# Patient Record
Sex: MALE | Race: WHITE | Employment: OTHER | URBAN - METROPOLITAN AREA
[De-identification: names, ages, dates, MRNs, and addresses within clinical notes are randomized per-mention and may not be internally consistent; named-entity substitution may affect disease eponyms.]

---

## 2017-02-16 ENCOUNTER — HOSPITAL ENCOUNTER (EMERGENCY)
Age: 77
Discharge: HOME | End: 2017-02-16
Attending: EMERGENCY MEDICINE
Payer: MEDICARE

## 2017-02-16 ENCOUNTER — APPOINTMENT (OUTPATIENT)
Dept: GENERAL RADIOLOGY | Age: 77
End: 2017-02-16
Attending: PHYSICIAN ASSISTANT
Payer: MEDICARE

## 2017-02-16 ENCOUNTER — APPOINTMENT (OUTPATIENT)
Dept: CT IMAGING | Age: 77
End: 2017-02-16
Attending: PHYSICIAN ASSISTANT
Payer: MEDICARE

## 2017-02-16 VITALS
OXYGEN SATURATION: 100 % | TEMPERATURE: 97.9 F | WEIGHT: 201.94 LBS | BODY MASS INDEX: 30.61 KG/M2 | DIASTOLIC BLOOD PRESSURE: 77 MMHG | RESPIRATION RATE: 24 BRPM | HEART RATE: 73 BPM | HEIGHT: 68 IN | SYSTOLIC BLOOD PRESSURE: 170 MMHG

## 2017-02-16 DIAGNOSIS — I16.0 HYPERTENSIVE URGENCY: Primary | ICD-10-CM

## 2017-02-16 LAB
ALBUMIN SERPL BCP-MCNC: 3.7 G/DL (ref 3.5–5)
ALBUMIN/GLOB SERPL: 1 {RATIO} (ref 1.1–2.2)
ALP SERPL-CCNC: 58 U/L (ref 45–117)
ALT SERPL-CCNC: 21 U/L (ref 12–78)
ANION GAP BLD CALC-SCNC: 10 MMOL/L (ref 5–15)
APPEARANCE UR: CLEAR
AST SERPL W P-5'-P-CCNC: 15 U/L (ref 15–37)
ATRIAL RATE: 84 BPM
BACTERIA URNS QL MICRO: NEGATIVE /HPF
BASOPHILS # BLD AUTO: 0 K/UL (ref 0–0.1)
BASOPHILS # BLD: 1 % (ref 0–1)
BILIRUB SERPL-MCNC: 1.1 MG/DL (ref 0.2–1)
BILIRUB UR QL: NEGATIVE
BUN SERPL-MCNC: 15 MG/DL (ref 6–20)
BUN/CREAT SERPL: 12 (ref 12–20)
CALCIUM SERPL-MCNC: 9.1 MG/DL (ref 8.5–10.1)
CALCULATED P AXIS, ECG09: 49 DEGREES
CALCULATED R AXIS, ECG10: -42 DEGREES
CALCULATED T AXIS, ECG11: 43 DEGREES
CHLORIDE SERPL-SCNC: 104 MMOL/L (ref 97–108)
CK MB CFR SERPL CALC: 2.3 % (ref 0–2.5)
CK MB SERPL-MCNC: 2.2 NG/ML (ref 5–25)
CK SERPL-CCNC: 94 U/L (ref 39–308)
CO2 SERPL-SCNC: 24 MMOL/L (ref 21–32)
COLOR UR: ABNORMAL
CREAT SERPL-MCNC: 1.28 MG/DL (ref 0.7–1.3)
DIAGNOSIS, 93000: NORMAL
EOSINOPHIL # BLD: 0.2 K/UL (ref 0–0.4)
EOSINOPHIL NFR BLD: 3 % (ref 0–7)
EPITH CASTS URNS QL MICRO: ABNORMAL /LPF
ERYTHROCYTE [DISTWIDTH] IN BLOOD BY AUTOMATED COUNT: 12.7 % (ref 11.5–14.5)
GLOBULIN SER CALC-MCNC: 3.7 G/DL (ref 2–4)
GLUCOSE SERPL-MCNC: 225 MG/DL (ref 65–100)
GLUCOSE UR STRIP.AUTO-MCNC: 100 MG/DL
HCT VFR BLD AUTO: 37.6 % (ref 36.6–50.3)
HGB BLD-MCNC: 13.6 G/DL (ref 12.1–17)
HGB UR QL STRIP: ABNORMAL
HYALINE CASTS URNS QL MICRO: ABNORMAL /LPF (ref 0–5)
KETONES UR QL STRIP.AUTO: NEGATIVE MG/DL
LEUKOCYTE ESTERASE UR QL STRIP.AUTO: ABNORMAL
LYMPHOCYTES # BLD AUTO: 24 % (ref 12–49)
LYMPHOCYTES # BLD: 1.5 K/UL (ref 0.8–3.5)
MCH RBC QN AUTO: 31.6 PG (ref 26–34)
MCHC RBC AUTO-ENTMCNC: 36.2 G/DL (ref 30–36.5)
MCV RBC AUTO: 87.4 FL (ref 80–99)
MONOCYTES # BLD: 0.4 K/UL (ref 0–1)
MONOCYTES NFR BLD AUTO: 7 % (ref 5–13)
NEUTS SEG # BLD: 4 K/UL (ref 1.8–8)
NEUTS SEG NFR BLD AUTO: 65 % (ref 32–75)
NITRITE UR QL STRIP.AUTO: NEGATIVE
P-R INTERVAL, ECG05: 146 MS
PH UR STRIP: 5.5 [PH] (ref 5–8)
PLATELET # BLD AUTO: 127 K/UL (ref 150–400)
POTASSIUM SERPL-SCNC: 4.1 MMOL/L (ref 3.5–5.1)
PROT SERPL-MCNC: 7.4 G/DL (ref 6.4–8.2)
PROT UR STRIP-MCNC: 100 MG/DL
Q-T INTERVAL, ECG07: 354 MS
QRS DURATION, ECG06: 108 MS
QTC CALCULATION (BEZET), ECG08: 418 MS
RBC # BLD AUTO: 4.3 M/UL (ref 4.1–5.7)
RBC #/AREA URNS HPF: ABNORMAL /HPF (ref 0–5)
SODIUM SERPL-SCNC: 138 MMOL/L (ref 136–145)
SP GR UR REFRACTOMETRY: 1.01 (ref 1–1.03)
TROPONIN I SERPL-MCNC: <0.04 NG/ML
UA: UC IF INDICATED,UAUC: ABNORMAL
UROBILINOGEN UR QL STRIP.AUTO: 0.2 EU/DL (ref 0.2–1)
VENTRICULAR RATE, ECG03: 84 BPM
WBC # BLD AUTO: 6.2 K/UL (ref 4.1–11.1)
WBC URNS QL MICRO: ABNORMAL /HPF (ref 0–4)

## 2017-02-16 PROCEDURE — 99284 EMERGENCY DEPT VISIT MOD MDM: CPT

## 2017-02-16 PROCEDURE — 84484 ASSAY OF TROPONIN QUANT: CPT | Performed by: PHYSICIAN ASSISTANT

## 2017-02-16 PROCEDURE — 85025 COMPLETE CBC W/AUTO DIFF WBC: CPT | Performed by: PHYSICIAN ASSISTANT

## 2017-02-16 PROCEDURE — 93005 ELECTROCARDIOGRAM TRACING: CPT

## 2017-02-16 PROCEDURE — 36415 COLL VENOUS BLD VENIPUNCTURE: CPT | Performed by: PHYSICIAN ASSISTANT

## 2017-02-16 PROCEDURE — 81001 URINALYSIS AUTO W/SCOPE: CPT | Performed by: PHYSICIAN ASSISTANT

## 2017-02-16 PROCEDURE — 71020 XR CHEST PA LAT: CPT

## 2017-02-16 PROCEDURE — 80053 COMPREHEN METABOLIC PANEL: CPT | Performed by: PHYSICIAN ASSISTANT

## 2017-02-16 PROCEDURE — 82550 ASSAY OF CK (CPK): CPT | Performed by: PHYSICIAN ASSISTANT

## 2017-02-16 PROCEDURE — 70450 CT HEAD/BRAIN W/O DYE: CPT

## 2017-02-16 RX ORDER — SODIUM CHLORIDE 0.9 % (FLUSH) 0.9 %
5-10 SYRINGE (ML) INJECTION EVERY 8 HOURS
Status: DISCONTINUED | OUTPATIENT
Start: 2017-02-16 | End: 2017-02-16 | Stop reason: HOSPADM

## 2017-02-16 RX ORDER — SODIUM CHLORIDE 0.9 % (FLUSH) 0.9 %
5-10 SYRINGE (ML) INJECTION AS NEEDED
Status: DISCONTINUED | OUTPATIENT
Start: 2017-02-16 | End: 2017-02-16 | Stop reason: HOSPADM

## 2017-02-16 NOTE — ED PROVIDER NOTES
HPI Comments: Fadumo Gunderson is a 68 y.o. male with pertinent PMHx of HTN, DM, CAD (1 stent) and liver disease presenting ambulatory to the ED c/o high blood pressure, from 591W-854L systolic, x this morning. Pt states that he \"felt flushed\" and experienced general malaise while at work today, which caused him concern. Pt states that he applied a wet wash cloth to his head and checked his blood pressure, which was 303 systolic and became progressively higher through multiple re-checks. Pt notes associated symptoms of lightheadedness, SOB, and slight headache. Pt states that these symptoms have decreased since onset. Pt states that his blood pressure was 826 systolic before deciding to come to the ED. Pt states that he is compliant with both of his blood pressure medications (Atenolol and Lisinopril), which he took this morning as usual. Pt states that his last cardiology appointment was over 1 year ago. Pt specifically denies any chest pain, vision changes, weakness, numbness, difficulty swallowing, recent cough/cold symptoms/congestion, fevers/chills, N/V/D (notes baseline diarrhea from Metformin use) or abdominal pain. PCP: Valarie Pink MD  Cardiology: Dr. Radha Schafer Hx: - tobacco use, - alcohol use, - illicit drug use    There are no other complaints, changes, or physical findings at this time. The history is provided by the patient. No  was used. Past Medical History:   Diagnosis Date    Arthritis     CAD (coronary artery disease)      coronary stent    Diabetes ([facility] [address]      iddm newly on insulin 3/2012    Hypertension     Liver disease        Past Surgical History:   Procedure Laterality Date    Pr cardiac surg procedure unlist  1997     stents    Hx cholecystectomy      Hx appendectomy      Hx tonsillectomy      Hx orthopaedic       Rt.  Knee cartlidge removal    Hx urological       Rt Orchiectomy    Vascular surgery procedure unlist       carotid artery endarterectomy    Pr colon ca scrn not hi rsk ind  10/29/2013               History reviewed. No pertinent family history. Social History     Social History    Marital status:      Spouse name: N/A    Number of children: N/A    Years of education: N/A     Occupational History    Not on file. Social History Main Topics    Smoking status: Never Smoker    Smokeless tobacco: Never Used    Alcohol use No    Drug use: No    Sexual activity: Not on file     Other Topics Concern    Not on file     Social History Narrative         ALLERGIES: Penicillins    Review of Systems   Constitutional: Negative. Negative for chills and fever. HENT: Negative for congestion, rhinorrhea and sore throat. Eyes: Negative. Negative for visual disturbance. Respiratory: Positive for shortness of breath. Negative for cough, chest tightness and wheezing. Cardiovascular: Negative. Negative for chest pain and palpitations. Gastrointestinal: Negative. Negative for abdominal pain, constipation, diarrhea, nausea and vomiting. Genitourinary: Negative. Negative for dysuria and hematuria. Musculoskeletal: Negative. Negative for arthralgias and myalgias. Skin: Negative. Negative for rash. Allergic/Immunologic: Negative. Negative for environmental allergies and food allergies. Neurological: Positive for light-headedness and headaches. Negative for weakness and numbness. Psychiatric/Behavioral: Negative. Negative for suicidal ideas. Vitals:    02/16/17 1145 02/16/17 1215 02/16/17 1230 02/16/17 1232   BP: 174/76   170/77   Pulse: 77 71 77 73   Resp: 23 24 23 24   Temp:       SpO2: 98% 98% 99% 100%   Weight:       Height:                Physical Exam   Constitutional: He is oriented to person, place, and time. He appears well-developed. No distress. Pt appears well, awake and alert in NAD. Elevated BMI   HENT:   Head: Normocephalic and atraumatic.    Right Ear: Tympanic membrane, external ear and ear canal normal.   Left Ear: Tympanic membrane, external ear and ear canal normal.   Nose: Nose normal.   Mouth/Throat: Uvula is midline and mucous membranes are normal.   Dry mucous membranes   Eyes: Conjunctivae and EOM are normal. Pupils are equal, round, and reactive to light. Right eye exhibits no discharge. Left eye exhibits no discharge. Neck: Normal range of motion. Cardiovascular: Normal rate, normal heart sounds and intact distal pulses. Pulmonary/Chest: Effort normal and breath sounds normal. No respiratory distress. He has no wheezes. He has no rales. He exhibits no tenderness. Abdominal: Soft. Bowel sounds are normal. There is no tenderness. There is no guarding. No CVA tenderness b/l. Protuberant abdomen   Musculoskeletal: Normal range of motion. He exhibits no edema or tenderness. Lymphadenopathy:     He has no cervical adenopathy. Neurological: He is alert and oriented to person, place, and time. No cranial nerve deficit. He displays a negative Romberg sign. Coordination normal. GCS eye subscore is 4. GCS verbal subscore is 5. GCS motor subscore is 6. No focal neuro deficits. Neuro intact of UE and LE B/L, Sensation intact of UE and LE B/L. Strength 5/5 of UE B/L, Strength 5/5 of LE B/L. Pt ambulatory with steady gait, without difficulty or assistance. Skin: Skin is warm and dry. No rash noted. He is not diaphoretic. No erythema. No pallor. Psychiatric: He has a normal mood and affect. His behavior is normal.   Nursing note and vitals reviewed.        MDM  Number of Diagnoses or Management Options  Hypertensive urgency:   Diagnosis management comments: DDx: hypertensive urgency vs hypertensive emergency,     acute ischemic heart disease       Amount and/or Complexity of Data Reviewed  Clinical lab tests: ordered and reviewed  Tests in the radiology section of CPT®: ordered and reviewed  Tests in the medicine section of CPT®: ordered and reviewed  Discussion of test results with the performing providers: yes (Attending)  Review and summarize past medical records: yes  Discuss the patient with other providers: yes (Attending)  Independent visualization of images, tracings, or specimens: yes    Patient Progress  Patient progress: improved    ED Course       Procedures    Progress Note:  10:52 AM  Per nursing staff, pt states that he also experienced palpitations/\"chest fluttering\" with his episode of feeling flushed this morning. Pt denies any history of arrhythmia. Written by JOSE JUAN Vinson, as dictated by Latricia Delgado PA-C. Progress Note:  11:03 AM  Discussed pt's hx and available diagnostic and imaging results with Daniel Bond MD, the pt's attending. Reviewed care plans and Daniel Bond MD is in agreement with the plan of care. Daniel Bond MD advises to monitor the pt's blood pressure at this time. Written by JOSE JUAN Vinson, as dictated by Latricia Delgado PA-C. Progress Note:  12:31 PM  Pt re-evaluated. The pt and spouse has asked if pt may be dc at this time, as he has an appointment at 1300 that he does not want to miss. Written by JOSE JUAN Vinson, as dictated by Latricia Delgado PA-C. Progress Note:  12:43 PM  Discussed pt's hx and available diagnostic and imaging results with Daniel Bond MD, the pt's attending. Reviewed care plans and Daniel Bond MD is in agreement with discharge at this time, as long as the pt has ambulated without difficulty or dizziness and will follow up with his PCP in the next 24-48 hours. Pt re-evaluated. Pt's blood pressure is 502L systolic at this time. Pt states that he has a PCP a week from now, but states that he will call to make a sooner appointment. Written by JOSE JUAN Vinson, as dictated by Latricia Delgado PA-C. Progress Note:  12:50 PM  Per nursing staff, pt has ambulated without difficulty. Written by JOSE JUAN Vinson, as dictated by Marquise Tran PA-C.     73:97SN  Provider re-evaluated pt. Per patient, symptoms have resolved. Provider discussed all available diagnostics, diagnosis, and treatment plan. Thoroughly discussed worrisome signs/symptoms in which pt should immediately return to ED, otherwise follow up with PCP. Patient conveys understanding and agreement to all of the above. All patient's questions were answered by provider. JADE Sapp     LABORATORY TESTS:  Recent Results (from the past 12 hour(s))   EKG, 12 LEAD, INITIAL    Collection Time: 02/16/17 10:33 AM   Result Value Ref Range    Ventricular Rate 84 BPM    Atrial Rate 84 BPM    P-R Interval 146 ms    QRS Duration 108 ms    Q-T Interval 354 ms    QTC Calculation (Bezet) 418 ms    Calculated P Axis 49 degrees    Calculated R Axis -42 degrees    Calculated T Axis 43 degrees    Diagnosis       Normal sinus rhythm  Left axis deviation  When compared with ECG of 19-MAR-2012 08:04,  No significant change was found     CBC WITH AUTOMATED DIFF    Collection Time: 02/16/17 11:05 AM   Result Value Ref Range    WBC 6.2 4.1 - 11.1 K/uL    RBC 4.30 4. 10 - 5.70 M/uL    HGB 13.6 12.1 - 17.0 g/dL    HCT 37.6 36.6 - 50.3 %    MCV 87.4 80.0 - 99.0 FL    MCH 31.6 26.0 - 34.0 PG    MCHC 36.2 30.0 - 36.5 g/dL    RDW 12.7 11.5 - 14.5 %    PLATELET 403 (L) 351 - 400 K/uL    NEUTROPHILS 65 32 - 75 %    LYMPHOCYTES 24 12 - 49 %    MONOCYTES 7 5 - 13 %    EOSINOPHILS 3 0 - 7 %    BASOPHILS 1 0 - 1 %    ABS. NEUTROPHILS 4.0 1.8 - 8.0 K/UL    ABS. LYMPHOCYTES 1.5 0.8 - 3.5 K/UL    ABS. MONOCYTES 0.4 0.0 - 1.0 K/UL    ABS. EOSINOPHILS 0.2 0.0 - 0.4 K/UL    ABS.  BASOPHILS 0.0 0.0 - 0.1 K/UL   METABOLIC PANEL, COMPREHENSIVE    Collection Time: 02/16/17 11:05 AM   Result Value Ref Range    Sodium 138 136 - 145 mmol/L    Potassium 4.1 3.5 - 5.1 mmol/L    Chloride 104 97 - 108 mmol/L    CO2 24 21 - 32 mmol/L    Anion gap 10 5 - 15 mmol/L    Glucose 225 (H) 65 - 100 mg/dL    BUN 15 6 - 20 MG/DL    Creatinine 1.28 0.70 - 1.30 MG/DL    BUN/Creatinine ratio 12 12 - 20      GFR est AA >60 >60 ml/min/1.73m2    GFR est non-AA 55 (L) >60 ml/min/1.73m2    Calcium 9.1 8.5 - 10.1 MG/DL    Bilirubin, total 1.1 (H) 0.2 - 1.0 MG/DL    ALT (SGPT) 21 12 - 78 U/L    AST (SGOT) 15 15 - 37 U/L    Alk. phosphatase 58 45 - 117 U/L    Protein, total 7.4 6.4 - 8.2 g/dL    Albumin 3.7 3.5 - 5.0 g/dL    Globulin 3.7 2.0 - 4.0 g/dL    A-G Ratio 1.0 (L) 1.1 - 2.2     TROPONIN I    Collection Time: 02/16/17 11:05 AM   Result Value Ref Range    Troponin-I, Qt. <0.04 <0.05 ng/mL   CK W/ CKMB & INDEX    Collection Time: 02/16/17 11:05 AM   Result Value Ref Range    CK 94 39 - 308 U/L    CK - MB 2.2 <3.6 NG/ML    CK-MB Index 2.3 0 - 2.5     URINALYSIS W/ REFLEX CULTURE    Collection Time: 02/16/17 11:05 AM   Result Value Ref Range    Color YELLOW/STRAW      Appearance CLEAR CLEAR      Specific gravity 1.009 1.003 - 1.030      pH (UA) 5.5 5.0 - 8.0      Protein 100 (A) NEG mg/dL    Glucose 100 (A) NEG mg/dL    Ketone NEGATIVE  NEG mg/dL    Bilirubin NEGATIVE  NEG      Blood SMALL (A) NEG      Urobilinogen 0.2 0.2 - 1.0 EU/dL    Nitrites NEGATIVE  NEG      Leukocyte Esterase TRACE (A) NEG      WBC 0-4 0 - 4 /hpf    RBC 5-10 0 - 5 /hpf    Epithelial cells FEW FEW /lpf    Bacteria NEGATIVE  NEG /hpf    UA:UC IF INDICATED CULTURE NOT INDICATED BY UA RESULT CNI      Hyaline cast 0-2 0 - 5 /lpf       IMAGING RESULTS:  CXR Results  (Last 48 hours)               02/16/17 1154  XR CHEST PA LAT Final result    Impression:  Impression: Left basilar atelectasis otherwise no acute abnormality. Narrative:  Exam:  2 view chest       Indication: Hypertension with lightheadedness today       Comparison to 5/1/2012. PA and lateral views demonstrate normal heart size. Left basilar atelectasis is   noted. The lungs are otherwise clear. Degenerative changes are seen in the   thoracic spine.                CT Results  (Last 48 hours) 02/16/17 1115  CT HEAD WO CONT Final result    Impression:  IMPRESSION: No acute abnormality               Narrative:  EXAM:  CT HEAD WO CONT       INDICATION:   lightheadedness and headache with elevated blood pressure today       COMPARISON: None. TECHNIQUE: Unenhanced CT of the head was performed using 5 mm images. Brain and   bone windows were generated. CT dose reduction was achieved through use of a   standardized protocol tailored for this examination and automatic exposure   control for dose modulation. FINDINGS:   Generalized atrophy is noted. There is no significant white matter disease. There is no intracranial hemorrhage, extra-axial collection, mass, mass effect   or midline shift. The basilar cisterns are open. No acute infarct is   identified. The bone windows demonstrate no abnormalities. The visualized   portions of the paranasal sinuses and mastoid air cells are clear. Vascular   calcification is noted. MEDICATIONS GIVEN:  Medications   sodium chloride (NS) flush 5-10 mL (not administered)   sodium chloride (NS) flush 5-10 mL (not administered)       IMPRESSION:  1. Hypertensive urgency        PLAN:  1. Current Discharge Medication List      CONTINUE these medications which have NOT CHANGED    Details   aspirin delayed-release 81 mg tablet Take 81 mg by mouth daily. insulin glargine (LANTUS SOLOSTAR) 100 unit/mL (3 mL) pen 15 Units by SubCUTAneous route daily. multivitamin (ONE A DAY) tablet Take 1 Tab by mouth daily. atenolol (TENORMIN) 50 mg tablet Take  by mouth daily. lisinopril (PRINIVIL, ZESTRIL) 40 mg tablet Take 40 mg by mouth daily. lovastatin (MEVACOR) 10 mg tablet Take 40 mg by mouth nightly. metformin (GLUCOPHAGE) 1,000 mg tablet Take 1,000 mg by mouth two (2) times daily (with meals). glipiZIDE (GLUCOTROL) 10 mg tablet Take 10 mg by mouth two (2) times a day.       traMADol (ULTRAM) 50 mg tablet Take 1 Tab by mouth every six (6) hours as needed for Pain. Max Daily Amount: 200 mg. Qty: 20 Tab, Refills: 0           2. Follow-up Information     Follow up With Details Comments Oswald Becerril MD Schedule an appointment as soon as possible for a visit in 1 day  [address]  [address] [address] [phone]      \[facility]\"" EMERGENCY DEPT  As needed or, If symptoms worsen [address] [facility] [address]   [phone]        Return to ED if worse   DISCHARGE NOTE:  12:54 PM  The patient is ready for discharge. The patient's signs, symptoms, diagnosis, and discharge instructions have been discussed and the patient and/or family has conveyed their understanding. The patient and/or family is to follow up as recommended or return to the ER should their symptoms worsen. Plan has been discussed and the patient and/or family is in agreement. Written by Kailee Alvarez, ED Scribe, as dictated by Rosendo Mcneal PA-C. Attestation: This note is prepared by Ralph Pineda. Saray Alvarez, acting as Scribe for [employer]p Kinney. Rosendo Mcneal PA-C: The scribe's documentation has been prepared under my direction and personally reviewed by me in its entirety. I confirm that the note above accurately reflects all work, treatment, procedures, and medical decision making performed by me. This note will not be viewable in [address]

## 2017-02-16 NOTE — DISCHARGE INSTRUCTIONS
Acute High Blood Pressure: Care Instructions  Your Care Instructions  Acute high blood pressure is very high blood pressure. It's a serious problem. Very high blood pressure can damage your brain, heart, eyes, and kidneys. You may have been given medicines to lower your blood pressure. You may have gotten them as pills or through a needle in one of your veins. This is called an IV. And maybe you were given other medicines too. These can be needed when high blood pressure causes other problems. To keep your blood pressure at a lower level, you may need to make healthy lifestyle changes. And you will probably need to take medicines. Be sure to follow up with your doctor about your blood pressure and what you can do about it. Follow-up care is a key part of your treatment and safety. Be sure to make and go to all appointments, and call your doctor if you are having problems. It's also a good idea to know your test results and keep a list of the medicines you take. How can you care for yourself at home? · See your doctor as often as he or she recommends. This is to make sure your blood pressure is under control. You will probably go at least 2 times a year. But it may be more often at first.  · Take your blood pressure medicine exactly as prescribed. You may take one or more types. They include diuretics, beta-blockers, ACE inhibitors, calcium channel blockers, and angiotensin II receptor blockers. Call your doctor if you think you are having a problem with your medicine. · If you take blood pressure medicine, talk to your doctor before you take decongestants or anti-inflammatory medicine, such as ibuprofen. These can raise blood pressure. · Learn how to check your blood pressure at home. Check it often. · Ask your doctor if it's okay to drink alcohol. · Talk to your doctor about lifestyle changes that can help blood pressure. These include being active and not smoking.   When should you call for help?  Call 911 anytime you think you may need emergency care. This may mean having symptoms that suggest that your blood pressure is causing a serious heart or blood vessel problem. Your blood pressure may be over 180/110. For example, call 911 if:  · You have symptoms of a heart attack. These may include:  ¨ Chest pain or pressure, or a strange feeling in the chest.  ¨ Sweating. ¨ Shortness of breath. ¨ Nausea or vomiting. ¨ Pain, pressure, or a strange feeling in the back, neck, jaw, or upper belly or in one or both shoulders or arms. ¨ Lightheadedness or sudden weakness. ¨ A fast or irregular heartbeat. · You have symptoms of a stroke. These may include:  ¨ Sudden numbness, tingling, weakness, or loss of movement in your face, arm, or leg, especially on only one side of your body. ¨ Sudden vision changes. ¨ Sudden trouble speaking. ¨ Sudden confusion or trouble understanding simple statements. ¨ Sudden problems with walking or balance. ¨ A sudden, severe headache that is different from past headaches. · You have severe back or belly pain. Do not wait until your blood pressure comes down on its own. Get help right away. Call your doctor now or seek immediate care if:  · Your blood pressure is much higher than normal (such as 180/110 or higher), but you don't have symptoms. · You think high blood pressure is causing symptoms, such as:  ¨ Severe headache. ¨ Blurry vision. Watch closely for changes in your health, and be sure to contact your doctor if:  · Your blood pressure measures 140/90 or higher at least 2 times. That means the top number is 140 or higher or the bottom number is 90 or higher, or both. · You think you may be having side effects from your blood pressure medicine. · Your blood pressure is usually normal, but it goes above normal at least 2 times. Where can you learn more? Go to http://wilman-aliza.info/.   Enter W692 in the search box to learn more about \"Acute High Blood Pressure: Care Instructions. \"  Current as of: August 8, 2016  Content Version: 11.1  © 5097-0058 [employer]. Care instructions adapted under license by [employer] (which disclaims liability or warranty for this information). If you have questions about a medical condition or this instruction, always ask your healthcare professional. [address] any warranty or liability for your use of this information.

## 2019-08-31 ENCOUNTER — APPOINTMENT (OUTPATIENT)
Dept: GENERAL RADIOLOGY | Age: 79
End: 2019-08-31
Attending: EMERGENCY MEDICINE
Payer: MEDICARE

## 2019-08-31 ENCOUNTER — HOSPITAL ENCOUNTER (EMERGENCY)
Age: 79
Discharge: HOME | End: 2019-08-31
Attending: EMERGENCY MEDICINE
Payer: MEDICARE

## 2019-08-31 ENCOUNTER — APPOINTMENT (OUTPATIENT)
Dept: ULTRASOUND IMAGING | Age: 79
End: 2019-08-31
Attending: EMERGENCY MEDICINE
Payer: MEDICARE

## 2019-08-31 VITALS
DIASTOLIC BLOOD PRESSURE: 88 MMHG | WEIGHT: 187.83 LBS | RESPIRATION RATE: 16 BRPM | SYSTOLIC BLOOD PRESSURE: 182 MMHG | OXYGEN SATURATION: 100 % | HEIGHT: 67 IN | HEART RATE: 62 BPM | BODY MASS INDEX: 29.48 KG/M2 | TEMPERATURE: 97.4 F

## 2019-08-31 DIAGNOSIS — M79.604 RIGHT LEG PAIN: Primary | ICD-10-CM

## 2019-08-31 PROCEDURE — 73562 X-RAY EXAM OF KNEE 3: CPT

## 2019-08-31 PROCEDURE — 93971 EXTREMITY STUDY: CPT

## 2019-08-31 PROCEDURE — 99283 EMERGENCY DEPT VISIT LOW MDM: CPT

## 2019-08-31 NOTE — ED NOTES
Bedside and Verbal shift change report given to [facility], RN (oncoming nurse) by Tylor Sung RN (offgoing nurse). Report included the following information SBAR, ED Summary, MAR and Recent Results.

## 2019-08-31 NOTE — ED NOTES
Report received from [facility]. They advised of the patient's chief complaint, current status, orders completed (to include IV access/medications/radiology testing), outstanding orders that still need to be completed, and the treatment plan. Questions asked and answered prior to assumption of care.

## 2019-09-01 NOTE — ED PROVIDER NOTES
EMERGENCY DEPARTMENT HISTORY AND PHYSICAL EXAM      Date: 8/31/2019  Patient Name: Dario Lepe Sr.  Patient Age and Sex: 78 y.o. male    History of Presenting Illness     Chief Complaint   Patient presents with    Leg Pain     sent by pt [facility] to r/o dvt. rt posterior lower leg pain with foot swelling since thursday. no recent travel       History Provided By: Patient    Ability to gather history was limited by: None    HPI: Dolores Johnson., 78 y.o. male sent from local urgent care center for rule out DVT. Almost a week of persistent pain in the right lower leg, mostly in the calf and behind the right knee. Says that he had a minor stumble on uneven pavement last week, may have \"tweaked\" his knee. No shortness of breath. No new numbness or weakness. Location: Pain and pressure behind the right knee and in the right calf  Quality:    Pressure  Severity: Mild  Duration: 1 week  Timing:    Constant  Context: Spontaneous, may have been triggered by minor fall  Modifying factors: None  Associated symptoms: No weakness or numbness    Pt denies any other alleviating or exacerbating factors. There are no other complaints, changes or physical findings at this time. Past Medical History:   Diagnosis Date    Arthritis     CAD (coronary artery disease)     coronary stent    Diabetes ([facility] [address]     iddm newly on insulin 3/2012    Hypertension     Liver disease      Past Surgical History:   Procedure Laterality Date    CARDIAC SURG PROCEDURE UNLIST  1997    stents    HX APPENDECTOMY      HX CHOLECYSTECTOMY      HX ORTHOPAEDIC      Rt.  Knee cartlidge removal    HX TONSILLECTOMY      HX UROLOGICAL      Rt Orchiectomy    [place] COLON CA SCRN NOT HI RSK IND  10/29/2013         VASCULAR SURGERY PROCEDURE UNLIST      carotid artery endarterectomy       PCP: Trista James MD    Past History   Past Medical History:  Past Medical History:   Diagnosis Date    Arthritis     CAD (coronary artery disease) coronary stent    Diabetes ([facility] [address]     iddm newly on insulin 3/2012    Hypertension     Liver disease        Past Surgical History:  Past Surgical History:   Procedure Laterality Date    CARDIAC SURG PROCEDURE UNLIST  1997    stents    HX APPENDECTOMY      HX CHOLECYSTECTOMY      HX ORTHOPAEDIC      Rt. Knee cartlidge removal    HX TONSILLECTOMY      HX UROLOGICAL      Rt Orchiectomy    CT COLON CA SCRN NOT HI RSK IND  10/29/2013         VASCULAR SURGERY PROCEDURE UNLIST      carotid artery endarterectomy       Family History:  History reviewed. No pertinent family history. Social History:  Social History     Tobacco Use    Smoking status: Never Smoker    Smokeless tobacco: Never Used   Substance Use Topics    Alcohol use: No    Drug use: No       Allergies: Allergies   Allergen Reactions    Penicillins Rash       Current Medications:  No current facility-administered medications on file prior to encounter. Current Outpatient Medications on File Prior to Encounter   Medication Sig Dispense Refill    traMADol (ULTRAM) 50 mg tablet Take 1 Tab by mouth every six (6) hours as needed for Pain. Max Daily Amount: 200 mg. 20 Tab 0    aspirin delayed-release 81 mg tablet Take 81 mg by mouth daily.  insulin glargine (LANTUS SOLOSTAR) 100 unit/mL (3 mL) pen 15 Units by SubCUTAneous route daily.  multivitamin (ONE A DAY) tablet Take 1 Tab by mouth daily.  atenolol (TENORMIN) 50 mg tablet Take  by mouth daily.  lisinopril (PRINIVIL, ZESTRIL) 40 mg tablet Take 40 mg by mouth daily.  lovastatin (MEVACOR) 10 mg tablet Take 40 mg by mouth nightly.  metformin (GLUCOPHAGE) 1,000 mg tablet Take 1,000 mg by mouth two (2) times daily (with meals).  glipiZIDE (GLUCOTROL) 10 mg tablet Take 10 mg by mouth two (2) times a day. Review of Systems   Review of Systems   Cardiovascular: Positive for leg swelling. All other systems reviewed and are negative.       Physical Exam Vital Signs  Patient Vitals for the past 24 hrs:   Temp Pulse Resp BP SpO2   08/31/19 1741 97.4 °F (36.3 °C) 62 16 182/88 100 %       Physical Exam   Constitutional: He is oriented to person, place, and time. He appears well-developed and well-nourished. No distress. Musculoskeletal: Normal range of motion. Right lower leg: He exhibits no tenderness and no swelling. No significant concerning swelling or tenderness by exam.  No erythema. Palpable DP pulse on left. No palpable DP pulse on right but no unusual pallor or coolness. DP signal was readily identified by Doppler. Neurological: He is alert and oriented to person, place, and time. He has normal strength. No sensory deficit. Skin: Skin is warm and dry. No erythema. Psychiatric: He has a normal mood and affect. His behavior is normal. Thought content normal.   Nursing note and vitals reviewed. Diagnostic Study Results   Labs  No results found for this or any previous visit (from the past 24 hour(s)). Radiologic Studies  XR KNEE RT 3 V   Final Result   IMPRESSION: No acute abnormality. CT Results  (Last 48 hours)    None        CXR Results  (Last 48 hours)    None          Procedures     Procedures    Medical Decision Making     Provider Notes (Medical Decision Making):   70-year-old male with left leg pressure and swelling. By exam there is no significant swelling or tenderness. No erythema. No palpable DP pulse, but DP signal was readily identified by Doppler. He has a history of bilateral carotid endarterectomy, and likely is having some claudication and arterial compromise, but no evidence of acute limb ischemia at this time. No indication for emergent vascular consult or angiography. X-rays of the knee were negative. DVT study was negative. No evidence of Baker's cyst.  He will continue taking aspirin and follow-up with his vascular surgeon. Michelle Ocampo MD  11:20 PM        Consult required? No      Medications Administered During ED Course:  Medications - No data to display       Diagnosis     Disposition:  Discharged    Clinical Impression:   1. Right leg pain        Attestation:  I personally performed the services described in this documentation on this date 8/31/2019 for patient Amber Cantu Sr. Allan Miller MD        I was the first provider for this patient on this visit. To the best of my ability I reviewed relevant prior medical records, electrocardiograms, laboratories, and radiologic studies. The patient's presenting problems were discussed, and the patient was in agreement with the care plan formulated and outlined with them. Baron Brown MD    Please note that this dictation was completed with Dragon voice recognition software. Quite often unanticipated grammatical, syntax, homophones, and other interpretive errors are inadvertently transcribed by the computer software. Please disregard these errors and excuse any errors that have escaped final proofreading.

## 2021-04-10 ENCOUNTER — APPOINTMENT (OUTPATIENT)
Dept: ULTRASOUND IMAGING | Age: 81
End: 2021-04-10
Attending: EMERGENCY MEDICINE
Payer: MEDICARE

## 2021-04-10 ENCOUNTER — HOSPITAL ENCOUNTER (EMERGENCY)
Age: 81
Discharge: HOME | End: 2021-04-10
Attending: EMERGENCY MEDICINE | Admitting: EMERGENCY MEDICINE
Payer: MEDICARE

## 2021-04-10 ENCOUNTER — APPOINTMENT (OUTPATIENT)
Dept: GENERAL RADIOLOGY | Age: 81
End: 2021-04-10
Attending: EMERGENCY MEDICINE
Payer: MEDICARE

## 2021-04-10 VITALS
OXYGEN SATURATION: 98 % | SYSTOLIC BLOOD PRESSURE: 117 MMHG | BODY MASS INDEX: 29.4 KG/M2 | DIASTOLIC BLOOD PRESSURE: 59 MMHG | HEIGHT: 68 IN | HEART RATE: 69 BPM | TEMPERATURE: 97.4 F | RESPIRATION RATE: 16 BRPM | WEIGHT: 194 LBS

## 2021-04-10 DIAGNOSIS — L03.116 CELLULITIS OF LEFT LOWER EXTREMITY: Primary | ICD-10-CM

## 2021-04-10 DIAGNOSIS — R73.9 HYPERGLYCEMIA: ICD-10-CM

## 2021-04-10 DIAGNOSIS — M72.2 PLANTAR FASCIITIS: ICD-10-CM

## 2021-04-10 DIAGNOSIS — M79.604 RIGHT LEG PAIN: ICD-10-CM

## 2021-04-10 LAB
ALBUMIN SERPL-MCNC: 3.7 G/DL (ref 3.5–5)
ALBUMIN/GLOB SERPL: 0.9 {RATIO} (ref 1.1–2.2)
ALP SERPL-CCNC: 76 U/L (ref 45–117)
ALT SERPL-CCNC: 19 U/L (ref 12–78)
ANION GAP SERPL CALC-SCNC: 5 MMOL/L (ref 5–15)
AST SERPL-CCNC: 14 U/L (ref 15–37)
BASOPHILS # BLD: 0 K/UL (ref 0–0.1)
BASOPHILS NFR BLD: 1 % (ref 0–1)
BILIRUB SERPL-MCNC: 1.1 MG/DL (ref 0.2–1)
BUN SERPL-MCNC: 35 MG/DL (ref 6–20)
BUN/CREAT SERPL: 19 (ref 12–20)
CALCIUM SERPL-MCNC: 9.6 MG/DL (ref 8.5–10.1)
CHLORIDE SERPL-SCNC: 104 MMOL/L (ref 97–108)
CO2 SERPL-SCNC: 27 MMOL/L (ref 21–32)
CREAT SERPL-MCNC: 1.83 MG/DL (ref 0.7–1.3)
DIFFERENTIAL METHOD BLD: ABNORMAL
EOSINOPHIL # BLD: 0.2 K/UL (ref 0–0.4)
EOSINOPHIL NFR BLD: 3 % (ref 0–7)
ERYTHROCYTE [DISTWIDTH] IN BLOOD BY AUTOMATED COUNT: 12.1 % (ref 11.5–14.5)
GLOBULIN SER CALC-MCNC: 4.3 G/DL (ref 2–4)
GLUCOSE SERPL-MCNC: 414 MG/DL (ref 65–100)
HCT VFR BLD AUTO: 34.9 % (ref 36.6–50.3)
HGB BLD-MCNC: 12.3 G/DL (ref 12.1–17)
IMM GRANULOCYTES # BLD AUTO: 0 K/UL (ref 0–0.04)
IMM GRANULOCYTES NFR BLD AUTO: 0 % (ref 0–0.5)
LACTATE BLD-SCNC: 1.82 MMOL/L (ref 0.4–2)
LYMPHOCYTES # BLD: 1.6 K/UL (ref 0.8–3.5)
LYMPHOCYTES NFR BLD: 26 % (ref 12–49)
MCH RBC QN AUTO: 31.6 PG (ref 26–34)
MCHC RBC AUTO-ENTMCNC: 35.2 G/DL (ref 30–36.5)
MCV RBC AUTO: 89.7 FL (ref 80–99)
MONOCYTES # BLD: 0.4 K/UL (ref 0–1)
MONOCYTES NFR BLD: 7 % (ref 5–13)
NEUTS SEG # BLD: 4 K/UL (ref 1.8–8)
NEUTS SEG NFR BLD: 63 % (ref 32–75)
NRBC # BLD: 0 K/UL (ref 0–0.01)
NRBC BLD-RTO: 0 PER 100 WBC
PLATELET # BLD AUTO: 140 K/UL (ref 150–400)
PMV BLD AUTO: 10.2 FL (ref 8.9–12.9)
POTASSIUM SERPL-SCNC: 4.6 MMOL/L (ref 3.5–5.1)
PROT SERPL-MCNC: 8 G/DL (ref 6.4–8.2)
RBC # BLD AUTO: 3.89 M/UL (ref 4.1–5.7)
SODIUM SERPL-SCNC: 136 MMOL/L (ref 136–145)
WBC # BLD AUTO: 6.3 K/UL (ref 4.1–11.1)

## 2021-04-10 PROCEDURE — 96365 THER/PROPH/DIAG IV INF INIT: CPT

## 2021-04-10 PROCEDURE — 83605 ASSAY OF LACTIC ACID: CPT

## 2021-04-10 PROCEDURE — 85025 COMPLETE CBC W/AUTO DIFF WBC: CPT

## 2021-04-10 PROCEDURE — 74011250636 HC RX REV CODE- 250/636: Performed by: EMERGENCY MEDICINE

## 2021-04-10 PROCEDURE — 99284 EMERGENCY DEPT VISIT MOD MDM: CPT

## 2021-04-10 PROCEDURE — 80053 COMPREHEN METABOLIC PANEL: CPT

## 2021-04-10 PROCEDURE — 73620 X-RAY EXAM OF FOOT: CPT

## 2021-04-10 PROCEDURE — 96374 THER/PROPH/DIAG INJ IV PUSH: CPT

## 2021-04-10 PROCEDURE — 93971 EXTREMITY STUDY: CPT

## 2021-04-10 PROCEDURE — 74011636637 HC RX REV CODE- 636/637: Performed by: EMERGENCY MEDICINE

## 2021-04-10 PROCEDURE — 36415 COLL VENOUS BLD VENIPUNCTURE: CPT

## 2021-04-10 PROCEDURE — 96375 TX/PRO/DX INJ NEW DRUG ADDON: CPT

## 2021-04-10 RX ORDER — HYDROCHLOROTHIAZIDE 25 MG/1
25 TABLET ORAL
COMMUNITY
End: 2022-01-04

## 2021-04-10 RX ORDER — CLINDAMYCIN HYDROCHLORIDE 300 MG/1
300 CAPSULE ORAL 4 TIMES DAILY
Qty: 40 CAP | Refills: 0 | Status: SHIPPED
Start: 2021-04-10 | End: 2022-01-04

## 2021-04-10 RX ORDER — GABAPENTIN 600 MG/1
600 TABLET ORAL 2 TIMES DAILY
COMMUNITY

## 2021-04-10 RX ORDER — GLUCOSAMINE SULFATE 1500 MG
1000 POWDER IN PACKET (EA) ORAL DAILY
COMMUNITY

## 2021-04-10 RX ORDER — INSULIN GLARGINE 100 [IU]/ML
15 INJECTION, SOLUTION SUBCUTANEOUS DAILY
COMMUNITY
End: 2022-05-06 | Stop reason: ALTCHOICE

## 2021-04-10 RX ORDER — METHOCARBAMOL 750 MG/1
750 TABLET, FILM COATED ORAL
Qty: 20 TAB | Refills: 0 | Status: SHIPPED | OUTPATIENT
Start: 2021-04-10 | End: 2022-05-06 | Stop reason: ALTCHOICE

## 2021-04-10 RX ORDER — CLINDAMYCIN PHOSPHATE 600 MG/50ML
600 INJECTION INTRAVENOUS
Status: COMPLETED | OUTPATIENT
Start: 2021-04-10 | End: 2021-04-10

## 2021-04-10 RX ORDER — AMLODIPINE BESYLATE 5 MG/1
5 TABLET ORAL DAILY
COMMUNITY
End: 2022-01-04

## 2021-04-10 RX ORDER — LEVOFLOXACIN 750 MG/1
750 TABLET ORAL EVERY OTHER DAY
COMMUNITY
Start: 2021-03-29 | End: 2022-01-04

## 2021-04-10 RX ORDER — FINASTERIDE 5 MG/1
5 TABLET, FILM COATED ORAL DAILY
COMMUNITY

## 2021-04-10 RX ORDER — TAMSULOSIN HYDROCHLORIDE 0.4 MG/1
0.4 CAPSULE ORAL
COMMUNITY

## 2021-04-10 RX ADMIN — HUMAN INSULIN 2 UNITS: 100 INJECTION, SOLUTION SUBCUTANEOUS at 15:27

## 2021-04-10 RX ADMIN — CLINDAMYCIN IN 5 PERCENT DEXTROSE 600 MG: 12 INJECTION, SOLUTION INTRAVENOUS at 14:16

## 2021-04-10 NOTE — ED PROVIDER NOTES
EMERGENCY DEPARTMENT HISTORY AND PHYSICAL EXAM      Date: 4/10/2021  Patient Name: Preston Holstein. History of Presenting Illness     Chief Complaint   Patient presents with    Skin Problem     pt has an infection in his left foot for which he is on antibx. Family is concerned because it is not getting better.  Knee Pain     swelling area behind rt knee since yesterday. History Provided By: Patient    HPI: Jhonathan Bernal Sr., 80 y.o. male presents to the ED with cc of left foot infection and right leg pain. Patient states that he has had an infection in his left fifth toe for least 10 days. He was prescribed Levaquin and has finished his course. His ulcer to the left fifth toe was reevaluated yesterday and cleaned thoroughly. He has not seen much improvement in the area and has some erythema involving the distal left foot. His pain is moderate severity basically when he touches the area. Patient denies fever, chills, cough, chest pain or shortness of breath. He also has pain behind his right knee. He states that started yesterday. He thought he had a charley horse, but the pain is still present when he walks. At that time, is a 4 out of 10 in severity. It is from the posterior right knee down to the calf. He also states that when he walks after being seated for a long time, he feels intense pain in his left plantar area which resolves with ambulation. There are no other complaints, changes, or physical findings at this time. PCP: Eugenia Valiente MD    No current facility-administered medications on file prior to encounter. Current Outpatient Medications on File Prior to Encounter   Medication Sig Dispense Refill    gabapentin (NEURONTIN) 600 mg tablet Take 600 mg by mouth two (2) times a day.  hydroCHLOROthiazide (HYDRODIURIL) 25 mg tablet Take 25 mg by mouth nightly.  finasteride (PROSCAR) 5 mg tablet Take 5 mg by mouth daily.       amLODIPine (NORVASC) 5 mg tablet Take 5 mg by mouth daily.  tamsulosin (FLOMAX) 0.4 mg capsule Take 0.4 mg by mouth nightly.  cholecalciferol (Vitamin D3) 25 mcg (1,000 unit) cap Take 1,000 Units by mouth daily.  insulin glargine (Lantus Solostar U-100 Insulin) 100 unit/mL (3 mL) inpn 15 Units by SubCUTAneous route daily.  levoFLOXacin (LEVAQUIN) 750 mg tablet Take 750 mg by mouth every other day.  aspirin delayed-release 81 mg tablet Take 81 mg by mouth daily.  multivitamin (ONE A DAY) tablet Take 1 Tab by mouth daily.  atenolol (TENORMIN) 50 mg tablet Take 100 mg by mouth daily.  lisinopril (PRINIVIL, ZESTRIL) 40 mg tablet Take 40 mg by mouth daily.  lovastatin (MEVACOR) 10 mg tablet Take 40 mg by mouth nightly.  metformin (GLUCOPHAGE) 1,000 mg tablet Take 1,000 mg by mouth two (2) times daily (with meals).  glipiZIDE (GLUCOTROL) 10 mg tablet Take 10 mg by mouth two (2) times a day.  [DISCONTINUED] traMADol (ULTRAM) 50 mg tablet Take 1 Tab by mouth every six (6) hours as needed for Pain. Max Daily Amount: 200 mg. 20 Tab 0    [DISCONTINUED] insulin glargine (LANTUS SOLOSTAR) 100 unit/mL (3 mL) pen 15 Units by SubCUTAneous route daily. Past History     Past Medical History:  Past Medical History:   Diagnosis Date    Arthritis     CAD (coronary artery disease)     coronary stent    Diabetes ([facility] [address]     iddm newly on insulin 3/2012    Hypertension     Liver disease        Past Surgical History:  Past Surgical History:   Procedure Laterality Date    CARDIAC SURG PROCEDURE UNLIST  1997    stents    HX APPENDECTOMY      HX CHOLECYSTECTOMY      HX ORTHOPAEDIC      Rt. Knee cartlidge removal    HX TONSILLECTOMY      HX UROLOGICAL      Rt Orchiectomy    CT COLON CA SCRN NOT HI RSK IND  10/29/2013         VASCULAR SURGERY PROCEDURE UNLIST      carotid artery endarterectomy       Family History:  No family history on file.     Social History:  Social History Tobacco Use    Smoking status: Never Smoker    Smokeless tobacco: Never Used   Substance Use Topics    Alcohol use: No    Drug use: No       Allergies: Allergies   Allergen Reactions    Penicillins Rash         Review of Systems   Review of Systems   Constitutional: Negative for fever. HENT: Negative for congestion. Eyes: Negative. Respiratory: Negative for shortness of breath. Cardiovascular: Negative for chest pain. Gastrointestinal: Negative for abdominal pain. Endocrine: Negative for heat intolerance. Genitourinary: Negative. Musculoskeletal: Negative for back pain. Skin: Positive for rash and wound. Allergic/Immunologic: Negative for immunocompromised state. Neurological: Negative for dizziness. Hematological: Does not bruise/bleed easily. Psychiatric/Behavioral: Negative. All other systems reviewed and are negative. Physical Exam   Physical Exam  Vitals signs and nursing note reviewed. Constitutional:       General: He is not in acute distress. Appearance: He is well-developed. HENT:      Head: Normocephalic and atraumatic. Neck:      Musculoskeletal: Normal range of motion. Cardiovascular:      Rate and Rhythm: Normal rate and regular rhythm. Heart sounds: Normal heart sounds. Pulmonary:      Effort: Pulmonary effort is normal.      Breath sounds: Normal breath sounds. Abdominal:      General: Bowel sounds are normal.      Palpations: Abdomen is soft. Musculoskeletal: Normal range of motion. General: Tenderness present. Comments: Right posterior knee and proximal calf tenderness, left fifth toe tenderness to palpation   Skin:     General: Skin is warm and dry. Comments: Erythema distal to the left fifth toe, ulcer to the medial aspect of the left fifth toe   Neurological:      General: No focal deficit present. Mental Status: He is alert and oriented to person, place, and time.       Coordination: Coordination normal. Psychiatric:         Mood and Affect: Mood normal.         Behavior: Behavior normal.         Diagnostic Study Results     Labs -     Recent Results (from the past 12 hour(s))   CBC WITH AUTOMATED DIFF    Collection Time: 04/10/21  2:07 PM   Result Value Ref Range    WBC 6.3 4.1 - 11.1 K/uL    RBC 3.89 (L) 4.10 - 5.70 M/uL    HGB 12.3 12.1 - 17.0 g/dL    HCT 34.9 (L) 36.6 - 50.3 %    MCV 89.7 80.0 - 99.0 FL    MCH 31.6 26.0 - 34.0 PG    MCHC 35.2 30.0 - 36.5 g/dL    RDW 12.1 11.5 - 14.5 %    PLATELET 101 (L) 261 - 400 K/uL    MPV 10.2 8.9 - 12.9 FL    NRBC 0.0 0  WBC    ABSOLUTE NRBC 0.00 0.00 - 0.01 K/uL    NEUTROPHILS 63 32 - 75 %    LYMPHOCYTES 26 12 - 49 %    MONOCYTES 7 5 - 13 %    EOSINOPHILS 3 0 - 7 %    BASOPHILS 1 0 - 1 %    IMMATURE GRANULOCYTES 0 0.0 - 0.5 %    ABS. NEUTROPHILS 4.0 1.8 - 8.0 K/UL    ABS. LYMPHOCYTES 1.6 0.8 - 3.5 K/UL    ABS. MONOCYTES 0.4 0.0 - 1.0 K/UL    ABS. EOSINOPHILS 0.2 0.0 - 0.4 K/UL    ABS. BASOPHILS 0.0 0.0 - 0.1 K/UL    ABS. IMM. GRANS. 0.0 0.00 - 0.04 K/UL    DF AUTOMATED     METABOLIC PANEL, COMPREHENSIVE    Collection Time: 04/10/21  2:07 PM   Result Value Ref Range    Sodium 136 136 - 145 mmol/L    Potassium 4.6 3.5 - 5.1 mmol/L    Chloride 104 97 - 108 mmol/L    CO2 27 21 - 32 mmol/L    Anion gap 5 5 - 15 mmol/L    Glucose 414 (H) 65 - 100 mg/dL    BUN 35 (H) 6 - 20 MG/DL    Creatinine 1.83 (H) 0.70 - 1.30 MG/DL    BUN/Creatinine ratio 19 12 - 20      GFR est AA 43 (L) >60 ml/min/1.73m2    GFR est non-AA 36 (L) >60 ml/min/1.73m2    Calcium 9.6 8.5 - 10.1 MG/DL    Bilirubin, total 1.1 (H) 0.2 - 1.0 MG/DL    ALT (SGPT) 19 12 - 78 U/L    AST (SGOT) 14 (L) 15 - 37 U/L    Alk.  phosphatase 76 45 - 117 U/L    Protein, total 8.0 6.4 - 8.2 g/dL    Albumin 3.7 3.5 - 5.0 g/dL    Globulin 4.3 (H) 2.0 - 4.0 g/dL    A-G Ratio 0.9 (L) 1.1 - 2.2     POC LACTIC ACID    Collection Time: 04/10/21  2:13 PM   Result Value Ref Range    Lactic Acid (POC) 1.82 0.40 - 2.00 mmol/L Radiologic Studies -   XR FOOT LT AP/LAT   Final Result   No fracture or bony destructive lesion is identified. .        CT Results  (Last 48 hours)    None        CXR Results  (Last 48 hours)    None          Medical Decision Making   I am the first provider for this patient. I reviewed the vital signs, available nursing notes, past medical history, past surgical history, family history and social history. Vital Signs-Reviewed the patient's vital signs. Patient Vitals for the past 12 hrs:   Temp Pulse Resp BP SpO2   04/10/21 1311 97.4 °F (36.3 °C) 69 16 (!) 164/59 100 %           Records Reviewed: Nursing Notes and Old Medical Records    Provider Notes (Medical Decision Making):   Cellulitis, infected foot ulcer, plantar fasciitis, DVT, Baker's cyst    ED Course:   Initial assessment performed. The patients presenting problems have been discussed, and they are in agreement with the care plan formulated and outlined with them. I have encouraged them to ask questions as they arise throughout their visit. Progress note:      Patient is feeling better. His results were reviewed. He is advised to follow-up and return to ER if worse           Critical Care Time:   0    Disposition:  home    DISCHARGE PLAN:  1. Discharge Medication List as of 4/10/2021  3:44 PM      START taking these medications    Details   clindamycin (CLEOCIN) 300 mg capsule Take 1 Cap by mouth four (4) times daily. , Normal, Disp-40 Cap, R-0      methocarbamoL (Robaxin-750) 750 mg tablet Take 1 Tab by mouth four (4) times daily as needed for Muscle Spasm(s). , Normal, Disp-20 Tab, R-0         CONTINUE these medications which have NOT CHANGED    Details   gabapentin (NEURONTIN) 600 mg tablet Take 600 mg by mouth two (2) times a day., Historical Med      hydroCHLOROthiazide (HYDRODIURIL) 25 mg tablet Take 25 mg by mouth nightly., Historical Med      finasteride (PROSCAR) 5 mg tablet Take 5 mg by mouth daily. , Historical Med amLODIPine (NORVASC) 5 mg tablet Take 5 mg by mouth daily. , Historical Med      tamsulosin (FLOMAX) 0.4 mg capsule Take 0.4 mg by mouth nightly., Historical Med      cholecalciferol (Vitamin D3) 25 mcg (1,000 unit) cap Take 1,000 Units by mouth daily. , Historical Med      insulin glargine (Lantus Solostar U-100 Insulin) 100 unit/mL (3 mL) inpn 15 Units by SubCUTAneous route daily. , Historical Med      levoFLOXacin (LEVAQUIN) 750 mg tablet Take 750 mg by mouth every other day., Historical Med      aspirin delayed-release 81 mg tablet Take 81 mg by mouth daily. , Historical Med      multivitamin (ONE A DAY) tablet Take 1 Tab by mouth daily. , Historical Med      atenolol (TENORMIN) 50 mg tablet Take 100 mg by mouth daily. , Historical Med      lisinopril (PRINIVIL, ZESTRIL) 40 mg tablet Take 40 mg by mouth daily. , Historical Med      lovastatin (MEVACOR) 10 mg tablet Take 40 mg by mouth nightly., Historical Med      metformin (GLUCOPHAGE) 1,000 mg tablet Take 1,000 mg by mouth two (2) times daily (with meals). , Historical Med      glipiZIDE (GLUCOTROL) 10 mg tablet Take 10 mg by mouth two (2) times a day., Historical Med           2. Follow-up Information     Follow up With Specialties Details Why Contact Info    Dary Brian MD Family Medicine In 2 days As needed [address]  [facility] [address]  [phone]      [facility] EMERGENCY DEPT Emergency Medicine  If symptoms worsen [address] [place] [address]  [phone]        3. Return to ED if worse     Diagnosis     Clinical Impression:   1. Cellulitis of left lower extremity    2. Right leg pain    3. Hyperglycemia    4. Plantar fasciitis        Attestations:    Attila Zurita MD    Please note that this dictation was completed with [employer], the [employer] voice recognition software. Quite often unanticipated grammatical, syntax, homophones, and other interpretive errors are inadvertently transcribed by the computer software. Please disregard these errors. Please excuse any errors that have escaped final proofreading. Thank you.

## 2021-12-27 ENCOUNTER — APPOINTMENT (OUTPATIENT)
Dept: GENERAL RADIOLOGY | Age: 81
DRG: 617 | End: 2021-12-27
Attending: STUDENT IN AN ORGANIZED HEALTH CARE EDUCATION/TRAINING PROGRAM
Payer: MEDICARE

## 2021-12-27 ENCOUNTER — APPOINTMENT (OUTPATIENT)
Dept: MRI IMAGING | Age: 81
DRG: 617 | End: 2021-12-27
Attending: EMERGENCY MEDICINE
Payer: MEDICARE

## 2021-12-27 ENCOUNTER — HOSPITAL ENCOUNTER (INPATIENT)
Age: 81
LOS: 7 days | Discharge: HOME HEALTH | DRG: 617 | End: 2022-01-04
Attending: EMERGENCY MEDICINE | Admitting: HOSPITALIST
Payer: MEDICARE

## 2021-12-27 ENCOUNTER — APPOINTMENT (OUTPATIENT)
Dept: GENERAL RADIOLOGY | Age: 81
DRG: 617 | End: 2021-12-27
Attending: EMERGENCY MEDICINE
Payer: MEDICARE

## 2021-12-27 DIAGNOSIS — L03.115 CELLULITIS OF RIGHT LOWER EXTREMITY: ICD-10-CM

## 2021-12-27 DIAGNOSIS — M86.9 OSTEOMYELITIS OF RIGHT FOOT, UNSPECIFIED TYPE (HCC): Primary | ICD-10-CM

## 2021-12-27 LAB
ALBUMIN SERPL-MCNC: 3.5 G/DL (ref 3.5–5)
ALBUMIN/GLOB SERPL: 0.8 {RATIO} (ref 1.1–2.2)
ALP SERPL-CCNC: 69 U/L (ref 45–117)
ALT SERPL-CCNC: 22 U/L (ref 12–78)
ANION GAP SERPL CALC-SCNC: 6 MMOL/L (ref 5–15)
AST SERPL-CCNC: 16 U/L (ref 15–37)
BASOPHILS # BLD: 0.1 K/UL (ref 0–0.1)
BASOPHILS NFR BLD: 1 % (ref 0–1)
BILIRUB SERPL-MCNC: 0.9 MG/DL (ref 0.2–1)
BUN SERPL-MCNC: 46 MG/DL (ref 6–20)
BUN/CREAT SERPL: 23 (ref 12–20)
CALCIUM SERPL-MCNC: 10.2 MG/DL (ref 8.5–10.1)
CHLORIDE SERPL-SCNC: 102 MMOL/L (ref 97–108)
CO2 SERPL-SCNC: 29 MMOL/L (ref 21–32)
COVID-19 RAPID TEST, COVR: NOT DETECTED
CREAT SERPL-MCNC: 2 MG/DL (ref 0.7–1.3)
DIFFERENTIAL METHOD BLD: NORMAL
EOSINOPHIL # BLD: 0.2 K/UL (ref 0–0.4)
EOSINOPHIL NFR BLD: 3 % (ref 0–7)
ERYTHROCYTE [DISTWIDTH] IN BLOOD BY AUTOMATED COUNT: 12.6 % (ref 11.5–14.5)
ERYTHROCYTE [SEDIMENTATION RATE] IN BLOOD: 74 MM/HR (ref 0–20)
GLOBULIN SER CALC-MCNC: 4.5 G/DL (ref 2–4)
GLUCOSE SERPL-MCNC: 333 MG/DL (ref 65–100)
HCT VFR BLD AUTO: 37.9 % (ref 36.6–50.3)
HGB BLD-MCNC: 13 G/DL (ref 12.1–17)
IMM GRANULOCYTES # BLD AUTO: 0 K/UL (ref 0–0.04)
IMM GRANULOCYTES NFR BLD AUTO: 0 % (ref 0–0.5)
INR PPP: 1 (ref 0.9–1.1)
LYMPHOCYTES # BLD: 1.5 K/UL (ref 0.8–3.5)
LYMPHOCYTES NFR BLD: 22 % (ref 12–49)
MCH RBC QN AUTO: 30.7 PG (ref 26–34)
MCHC RBC AUTO-ENTMCNC: 34.3 G/DL (ref 30–36.5)
MCV RBC AUTO: 89.6 FL (ref 80–99)
MONOCYTES # BLD: 0.4 K/UL (ref 0–1)
MONOCYTES NFR BLD: 6 % (ref 5–13)
NEUTS SEG # BLD: 4.8 K/UL (ref 1.8–8)
NEUTS SEG NFR BLD: 68 % (ref 32–75)
NRBC # BLD: 0 K/UL (ref 0–0.01)
NRBC BLD-RTO: 0 PER 100 WBC
PLATELET # BLD AUTO: 169 K/UL (ref 150–400)
PMV BLD AUTO: 9.9 FL (ref 8.9–12.9)
POTASSIUM SERPL-SCNC: 4.5 MMOL/L (ref 3.5–5.1)
PROT SERPL-MCNC: 8 G/DL (ref 6.4–8.2)
PROTHROMBIN TIME: 10.9 SEC (ref 9–11.1)
RBC # BLD AUTO: 4.23 M/UL (ref 4.1–5.7)
SODIUM SERPL-SCNC: 137 MMOL/L (ref 136–145)
SOURCE, COVRS: NORMAL
WBC # BLD AUTO: 7 K/UL (ref 4.1–11.1)

## 2021-12-27 PROCEDURE — 85610 PROTHROMBIN TIME: CPT

## 2021-12-27 PROCEDURE — 99284 EMERGENCY DEPT VISIT MOD MDM: CPT

## 2021-12-27 PROCEDURE — 85025 COMPLETE CBC W/AUTO DIFF WBC: CPT

## 2021-12-27 PROCEDURE — 96365 THER/PROPH/DIAG IV INF INIT: CPT

## 2021-12-27 PROCEDURE — 73630 X-RAY EXAM OF FOOT: CPT

## 2021-12-27 PROCEDURE — 85652 RBC SED RATE AUTOMATED: CPT

## 2021-12-27 PROCEDURE — 71045 X-RAY EXAM CHEST 1 VIEW: CPT

## 2021-12-27 PROCEDURE — 86140 C-REACTIVE PROTEIN: CPT

## 2021-12-27 PROCEDURE — 87040 BLOOD CULTURE FOR BACTERIA: CPT

## 2021-12-27 PROCEDURE — 87635 SARS-COV-2 COVID-19 AMP PRB: CPT

## 2021-12-27 PROCEDURE — 80053 COMPREHEN METABOLIC PANEL: CPT

## 2021-12-27 PROCEDURE — 96375 TX/PRO/DX INJ NEW DRUG ADDON: CPT

## 2021-12-27 PROCEDURE — A9576 INJ PROHANCE MULTIPACK: HCPCS | Performed by: EMERGENCY MEDICINE

## 2021-12-27 PROCEDURE — 74011250636 HC RX REV CODE- 250/636: Performed by: EMERGENCY MEDICINE

## 2021-12-27 PROCEDURE — 74011000258 HC RX REV CODE- 258: Performed by: EMERGENCY MEDICINE

## 2021-12-27 PROCEDURE — 73720 MRI LWR EXTREMITY W/O&W/DYE: CPT

## 2021-12-27 PROCEDURE — 36415 COLL VENOUS BLD VENIPUNCTURE: CPT

## 2021-12-27 RX ORDER — FENTANYL CITRATE 50 UG/ML
50 INJECTION, SOLUTION INTRAMUSCULAR; INTRAVENOUS
Status: COMPLETED | OUTPATIENT
Start: 2021-12-27 | End: 2021-12-27

## 2021-12-27 RX ORDER — ONDANSETRON 2 MG/ML
4 INJECTION INTRAMUSCULAR; INTRAVENOUS
Status: COMPLETED | OUTPATIENT
Start: 2021-12-27 | End: 2021-12-27

## 2021-12-27 RX ORDER — VANCOMYCIN 2 GRAM/500 ML IN 0.9 % SODIUM CHLORIDE INTRAVENOUS
2000
Status: COMPLETED | OUTPATIENT
Start: 2021-12-27 | End: 2021-12-28

## 2021-12-27 RX ADMIN — ONDANSETRON 4 MG: 2 INJECTION INTRAMUSCULAR; INTRAVENOUS at 20:59

## 2021-12-27 RX ADMIN — GADOTERIDOL 15 ML: 279.3 INJECTION, SOLUTION INTRAVENOUS at 21:51

## 2021-12-27 RX ADMIN — CEFEPIME HYDROCHLORIDE 1 G: 1 INJECTION, POWDER, FOR SOLUTION INTRAMUSCULAR; INTRAVENOUS at 21:00

## 2021-12-27 RX ADMIN — FENTANYL CITRATE 50 MCG: 50 INJECTION, SOLUTION INTRAMUSCULAR; INTRAVENOUS at 21:00

## 2021-12-27 RX ADMIN — VANCOMYCIN HYDROCHLORIDE 2000 MG: 10 INJECTION, POWDER, LYOPHILIZED, FOR SOLUTION INTRAVENOUS at 23:22

## 2021-12-28 ENCOUNTER — APPOINTMENT (OUTPATIENT)
Dept: VASCULAR SURGERY | Age: 81
DRG: 617 | End: 2021-12-28
Attending: INTERNAL MEDICINE
Payer: MEDICARE

## 2021-12-28 PROBLEM — M86.9 OSTEOMYELITIS OF RIGHT FOOT (HCC): Status: ACTIVE | Noted: 2021-12-28

## 2021-12-28 LAB
ALBUMIN SERPL-MCNC: 3 G/DL (ref 3.5–5)
ALBUMIN/GLOB SERPL: 0.8 {RATIO} (ref 1.1–2.2)
ALP SERPL-CCNC: 60 U/L (ref 45–117)
ALT SERPL-CCNC: 19 U/L (ref 12–78)
ANION GAP SERPL CALC-SCNC: 5 MMOL/L (ref 5–15)
AST SERPL-CCNC: 13 U/L (ref 15–37)
BASOPHILS # BLD: 0 K/UL (ref 0–0.1)
BASOPHILS NFR BLD: 1 % (ref 0–1)
BILIRUB SERPL-MCNC: 0.8 MG/DL (ref 0.2–1)
BUN SERPL-MCNC: 44 MG/DL (ref 6–20)
BUN/CREAT SERPL: 23 (ref 12–20)
CALCIUM SERPL-MCNC: 9.5 MG/DL (ref 8.5–10.1)
CHLORIDE SERPL-SCNC: 108 MMOL/L (ref 97–108)
CHOLEST SERPL-MCNC: 138 MG/DL
CO2 SERPL-SCNC: 27 MMOL/L (ref 21–32)
CREAT SERPL-MCNC: 1.95 MG/DL (ref 0.7–1.3)
CRP SERPL-MCNC: 1.27 MG/DL (ref 0–0.6)
DIFFERENTIAL METHOD BLD: ABNORMAL
EOSINOPHIL # BLD: 0.2 K/UL (ref 0–0.4)
EOSINOPHIL NFR BLD: 4 % (ref 0–7)
ERYTHROCYTE [DISTWIDTH] IN BLOOD BY AUTOMATED COUNT: 12.6 % (ref 11.5–14.5)
EST. AVERAGE GLUCOSE BLD GHB EST-MCNC: 292 MG/DL
GLOBULIN SER CALC-MCNC: 3.8 G/DL (ref 2–4)
GLUCOSE BLD STRIP.AUTO-MCNC: 129 MG/DL (ref 65–117)
GLUCOSE BLD STRIP.AUTO-MCNC: 173 MG/DL (ref 65–117)
GLUCOSE BLD STRIP.AUTO-MCNC: 175 MG/DL (ref 65–117)
GLUCOSE BLD STRIP.AUTO-MCNC: 218 MG/DL (ref 65–117)
GLUCOSE BLD STRIP.AUTO-MCNC: 263 MG/DL (ref 65–117)
GLUCOSE BLD STRIP.AUTO-MCNC: 287 MG/DL (ref 65–117)
GLUCOSE SERPL-MCNC: 274 MG/DL (ref 65–100)
HBA1C MFR BLD: 11.8 % (ref 4–5.6)
HCT VFR BLD AUTO: 34.4 % (ref 36.6–50.3)
HDLC SERPL-MCNC: 29 MG/DL
HDLC SERPL: 4.8 {RATIO} (ref 0–5)
HGB BLD-MCNC: 11.8 G/DL (ref 12.1–17)
IMM GRANULOCYTES # BLD AUTO: 0 K/UL (ref 0–0.04)
IMM GRANULOCYTES NFR BLD AUTO: 0 % (ref 0–0.5)
LACTATE SERPL-SCNC: 1 MMOL/L (ref 0.4–2)
LDLC SERPL CALC-MCNC: ABNORMAL MG/DL (ref 0–100)
LDLC SERPL DIRECT ASSAY-MCNC: 50 MG/DL (ref 0–100)
LYMPHOCYTES # BLD: 1.9 K/UL (ref 0.8–3.5)
LYMPHOCYTES NFR BLD: 31 % (ref 12–49)
MAGNESIUM SERPL-MCNC: 2.4 MG/DL (ref 1.6–2.4)
MCH RBC QN AUTO: 30.9 PG (ref 26–34)
MCHC RBC AUTO-ENTMCNC: 34.3 G/DL (ref 30–36.5)
MCV RBC AUTO: 90.1 FL (ref 80–99)
MONOCYTES # BLD: 0.4 K/UL (ref 0–1)
MONOCYTES NFR BLD: 6 % (ref 5–13)
NEUTS SEG # BLD: 3.5 K/UL (ref 1.8–8)
NEUTS SEG NFR BLD: 58 % (ref 32–75)
NRBC # BLD: 0 K/UL (ref 0–0.01)
NRBC BLD-RTO: 0 PER 100 WBC
PLATELET # BLD AUTO: 149 K/UL (ref 150–400)
PMV BLD AUTO: 9.7 FL (ref 8.9–12.9)
POTASSIUM SERPL-SCNC: 4.2 MMOL/L (ref 3.5–5.1)
PROT SERPL-MCNC: 6.8 G/DL (ref 6.4–8.2)
RBC # BLD AUTO: 3.82 M/UL (ref 4.1–5.7)
SERVICE CMNT-IMP: ABNORMAL
SODIUM SERPL-SCNC: 140 MMOL/L (ref 136–145)
TRIGL SERPL-MCNC: 615 MG/DL (ref ?–150)
VLDLC SERPL CALC-MCNC: ABNORMAL MG/DL
WBC # BLD AUTO: 6.1 K/UL (ref 4.1–11.1)

## 2021-12-28 PROCEDURE — 85025 COMPLETE CBC W/AUTO DIFF WBC: CPT

## 2021-12-28 PROCEDURE — 36415 COLL VENOUS BLD VENIPUNCTURE: CPT

## 2021-12-28 PROCEDURE — 74011250636 HC RX REV CODE- 250/636: Performed by: NURSE PRACTITIONER

## 2021-12-28 PROCEDURE — 65270000029 HC RM PRIVATE

## 2021-12-28 PROCEDURE — 74011250637 HC RX REV CODE- 250/637: Performed by: NURSE PRACTITIONER

## 2021-12-28 PROCEDURE — 74011000258 HC RX REV CODE- 258: Performed by: INTERNAL MEDICINE

## 2021-12-28 PROCEDURE — 83721 ASSAY OF BLOOD LIPOPROTEIN: CPT

## 2021-12-28 PROCEDURE — 93922 UPR/L XTREMITY ART 2 LEVELS: CPT

## 2021-12-28 PROCEDURE — 94760 N-INVAS EAR/PLS OXIMETRY 1: CPT

## 2021-12-28 PROCEDURE — 74011000258 HC RX REV CODE- 258: Performed by: NURSE PRACTITIONER

## 2021-12-28 PROCEDURE — 82962 GLUCOSE BLOOD TEST: CPT

## 2021-12-28 PROCEDURE — 74011250636 HC RX REV CODE- 250/636: Performed by: INTERNAL MEDICINE

## 2021-12-28 PROCEDURE — 83036 HEMOGLOBIN GLYCOSYLATED A1C: CPT

## 2021-12-28 PROCEDURE — 80053 COMPREHEN METABOLIC PANEL: CPT

## 2021-12-28 PROCEDURE — 74011636637 HC RX REV CODE- 636/637: Performed by: NURSE PRACTITIONER

## 2021-12-28 PROCEDURE — 80061 LIPID PANEL: CPT

## 2021-12-28 PROCEDURE — 83735 ASSAY OF MAGNESIUM: CPT

## 2021-12-28 PROCEDURE — 83605 ASSAY OF LACTIC ACID: CPT

## 2021-12-28 RX ORDER — GABAPENTIN 300 MG/1
600 CAPSULE ORAL 2 TIMES DAILY
Status: DISCONTINUED | OUTPATIENT
Start: 2021-12-28 | End: 2022-01-04 | Stop reason: HOSPADM

## 2021-12-28 RX ORDER — MELATONIN
1000 DAILY
Status: DISCONTINUED | OUTPATIENT
Start: 2021-12-28 | End: 2022-01-04 | Stop reason: HOSPADM

## 2021-12-28 RX ORDER — ACETAMINOPHEN 325 MG/1
650 TABLET ORAL
Status: DISCONTINUED | OUTPATIENT
Start: 2021-12-28 | End: 2022-01-04 | Stop reason: HOSPADM

## 2021-12-28 RX ORDER — ATENOLOL 50 MG/1
100 TABLET ORAL DAILY
Status: DISCONTINUED | OUTPATIENT
Start: 2021-12-28 | End: 2022-01-03

## 2021-12-28 RX ORDER — ACETAMINOPHEN 650 MG/1
650 SUPPOSITORY RECTAL
Status: DISCONTINUED | OUTPATIENT
Start: 2021-12-28 | End: 2022-01-04 | Stop reason: HOSPADM

## 2021-12-28 RX ORDER — INSULIN GLARGINE 100 [IU]/ML
15 INJECTION, SOLUTION SUBCUTANEOUS
Status: DISCONTINUED | OUTPATIENT
Start: 2021-12-28 | End: 2022-01-04 | Stop reason: HOSPADM

## 2021-12-28 RX ORDER — MAGNESIUM SULFATE 100 %
4 CRYSTALS MISCELLANEOUS AS NEEDED
Status: DISCONTINUED | OUTPATIENT
Start: 2021-12-28 | End: 2022-01-04 | Stop reason: HOSPADM

## 2021-12-28 RX ORDER — ONDANSETRON 4 MG/1
4 TABLET, ORALLY DISINTEGRATING ORAL
Status: DISCONTINUED | OUTPATIENT
Start: 2021-12-28 | End: 2022-01-04 | Stop reason: HOSPADM

## 2021-12-28 RX ORDER — ONDANSETRON 2 MG/ML
4 INJECTION INTRAMUSCULAR; INTRAVENOUS
Status: DISCONTINUED | OUTPATIENT
Start: 2021-12-28 | End: 2022-01-04 | Stop reason: HOSPADM

## 2021-12-28 RX ORDER — ENOXAPARIN SODIUM 100 MG/ML
40 INJECTION SUBCUTANEOUS DAILY
Status: DISCONTINUED | OUTPATIENT
Start: 2021-12-28 | End: 2021-12-30

## 2021-12-28 RX ORDER — FAMOTIDINE 20 MG/1
20 TABLET, FILM COATED ORAL 2 TIMES DAILY
Status: DISCONTINUED | OUTPATIENT
Start: 2021-12-28 | End: 2021-12-30

## 2021-12-28 RX ORDER — INSULIN LISPRO 100 [IU]/ML
INJECTION, SOLUTION INTRAVENOUS; SUBCUTANEOUS
Status: DISCONTINUED | OUTPATIENT
Start: 2021-12-28 | End: 2022-01-04 | Stop reason: HOSPADM

## 2021-12-28 RX ORDER — METRONIDAZOLE 500 MG/100ML
500 INJECTION, SOLUTION INTRAVENOUS EVERY 6 HOURS
Status: DISCONTINUED | OUTPATIENT
Start: 2021-12-28 | End: 2021-12-28

## 2021-12-28 RX ORDER — TAMSULOSIN HYDROCHLORIDE 0.4 MG/1
0.4 CAPSULE ORAL
Status: DISCONTINUED | OUTPATIENT
Start: 2021-12-28 | End: 2022-01-04 | Stop reason: HOSPADM

## 2021-12-28 RX ORDER — ASPIRIN 81 MG/1
81 TABLET ORAL DAILY
Status: DISCONTINUED | OUTPATIENT
Start: 2021-12-28 | End: 2022-01-04 | Stop reason: HOSPADM

## 2021-12-28 RX ORDER — LISINOPRIL 20 MG/1
40 TABLET ORAL DAILY
Status: DISCONTINUED | OUTPATIENT
Start: 2021-12-28 | End: 2021-12-30

## 2021-12-28 RX ORDER — METRONIDAZOLE 500 MG/100ML
500 INJECTION, SOLUTION INTRAVENOUS EVERY 12 HOURS
Status: DISCONTINUED | OUTPATIENT
Start: 2021-12-28 | End: 2022-01-04 | Stop reason: HOSPADM

## 2021-12-28 RX ORDER — SODIUM CHLORIDE 0.9 % (FLUSH) 0.9 %
5-40 SYRINGE (ML) INJECTION EVERY 8 HOURS
Status: DISCONTINUED | OUTPATIENT
Start: 2021-12-28 | End: 2022-01-04 | Stop reason: SDUPTHER

## 2021-12-28 RX ORDER — SODIUM CHLORIDE 0.9 % (FLUSH) 0.9 %
5-40 SYRINGE (ML) INJECTION AS NEEDED
Status: DISCONTINUED | OUTPATIENT
Start: 2021-12-28 | End: 2022-01-04 | Stop reason: HOSPADM

## 2021-12-28 RX ORDER — POLYETHYLENE GLYCOL 3350 17 G/17G
17 POWDER, FOR SOLUTION ORAL DAILY PRN
Status: DISCONTINUED | OUTPATIENT
Start: 2021-12-28 | End: 2022-01-04 | Stop reason: HOSPADM

## 2021-12-28 RX ORDER — AMLODIPINE BESYLATE 5 MG/1
5 TABLET ORAL DAILY
Status: DISCONTINUED | OUTPATIENT
Start: 2021-12-28 | End: 2022-01-04 | Stop reason: HOSPADM

## 2021-12-28 RX ORDER — HYDROCHLOROTHIAZIDE 25 MG/1
25 TABLET ORAL
Status: DISCONTINUED | OUTPATIENT
Start: 2021-12-28 | End: 2022-01-04 | Stop reason: HOSPADM

## 2021-12-28 RX ORDER — ATORVASTATIN CALCIUM 10 MG/1
10 TABLET, FILM COATED ORAL
Status: DISCONTINUED | OUTPATIENT
Start: 2021-12-28 | End: 2022-01-04 | Stop reason: HOSPADM

## 2021-12-28 RX ORDER — DEXTROSE 50 % IN WATER (D50W) INTRAVENOUS SYRINGE
12.5-25 AS NEEDED
Status: DISCONTINUED | OUTPATIENT
Start: 2021-12-28 | End: 2022-01-04 | Stop reason: HOSPADM

## 2021-12-28 RX ORDER — METRONIDAZOLE 500 MG/100ML
500 INJECTION, SOLUTION INTRAVENOUS EVERY 8 HOURS
Status: DISCONTINUED | OUTPATIENT
Start: 2021-12-28 | End: 2021-12-28

## 2021-12-28 RX ADMIN — INSULIN GLARGINE 15 UNITS: 100 INJECTION, SOLUTION SUBCUTANEOUS at 21:19

## 2021-12-28 RX ADMIN — SODIUM CHLORIDE, PRESERVATIVE FREE 10 ML: 5 INJECTION INTRAVENOUS at 21:20

## 2021-12-28 RX ADMIN — CHOLECALCIFEROL TAB 25 MCG (1000 UNIT) 1000 UNITS: 25 TAB at 09:52

## 2021-12-28 RX ADMIN — Medication 3 UNITS: at 13:01

## 2021-12-28 RX ADMIN — VANCOMYCIN HYDROCHLORIDE 1000 MG: 1 INJECTION, POWDER, LYOPHILIZED, FOR SOLUTION INTRAVENOUS at 22:37

## 2021-12-28 RX ADMIN — ACETAMINOPHEN 650 MG: 325 TABLET ORAL at 14:40

## 2021-12-28 RX ADMIN — LISINOPRIL 40 MG: 20 TABLET ORAL at 09:42

## 2021-12-28 RX ADMIN — METRONIDAZOLE 500 MG: 500 INJECTION, SOLUTION INTRAVENOUS at 14:40

## 2021-12-28 RX ADMIN — GABAPENTIN 600 MG: 300 CAPSULE ORAL at 09:42

## 2021-12-28 RX ADMIN — ASPIRIN 81 MG: 81 TABLET, COATED ORAL at 09:42

## 2021-12-28 RX ADMIN — CEFEPIME 2 G: 2 INJECTION, POWDER, FOR SOLUTION INTRAVENOUS at 16:11

## 2021-12-28 RX ADMIN — GABAPENTIN 600 MG: 300 CAPSULE ORAL at 18:00

## 2021-12-28 RX ADMIN — SODIUM CHLORIDE, PRESERVATIVE FREE 10 ML: 5 INJECTION INTRAVENOUS at 03:53

## 2021-12-28 RX ADMIN — ATORVASTATIN CALCIUM 10 MG: 10 TABLET, FILM COATED ORAL at 21:19

## 2021-12-28 RX ADMIN — METRONIDAZOLE 500 MG: 500 INJECTION, SOLUTION INTRAVENOUS at 03:38

## 2021-12-28 RX ADMIN — TAMSULOSIN HYDROCHLORIDE 0.4 MG: 0.4 CAPSULE ORAL at 21:19

## 2021-12-28 RX ADMIN — CEFEPIME HYDROCHLORIDE 2 G: 2 INJECTION, POWDER, FOR SOLUTION INTRAVENOUS at 03:38

## 2021-12-28 RX ADMIN — Medication 5 UNITS: at 09:40

## 2021-12-28 RX ADMIN — Medication 2 UNITS: at 18:00

## 2021-12-28 RX ADMIN — FAMOTIDINE 20 MG: 20 TABLET, FILM COATED ORAL at 09:43

## 2021-12-28 RX ADMIN — FAMOTIDINE 20 MG: 20 TABLET, FILM COATED ORAL at 18:00

## 2021-12-28 RX ADMIN — ENOXAPARIN SODIUM 40 MG: 100 INJECTION SUBCUTANEOUS at 09:42

## 2021-12-28 RX ADMIN — SODIUM CHLORIDE, PRESERVATIVE FREE 10 ML: 5 INJECTION INTRAVENOUS at 14:41

## 2021-12-28 RX ADMIN — HYDROCHLOROTHIAZIDE 25 MG: 25 TABLET ORAL at 21:19

## 2021-12-28 RX ADMIN — ATENOLOL 100 MG: 100 TABLET ORAL at 10:46

## 2021-12-28 RX ADMIN — AMLODIPINE BESYLATE 5 MG: 5 TABLET ORAL at 09:43

## 2021-12-28 NOTE — PROGRESS NOTES
Pharmacy Antimicrobial Kinetic Dosing    Indication for Antimicrobials: Osteomyelitis     Current Regimen of Each Antimicrobial:  Vancomycin 1000 mg q24h (Start Date ; Day 2)  Cefepime 2 g IV q12h (Start Date ; Day 2)  Metronidazole 500 mg IV q12h (Start Date ; Day 2)    Previous Antimicrobial Therapy:      Goal Level: AUC: 400-600 mg/hr/Liter/day    Date Dose & Interval Measured (mcg/mL) Predicted AUC/PETRONA                       Date & time of next level: TBD    Dosing calculator used: [employer] calculator    Significant Positive Cultures:    blood: pending    Conditions for Dosing Consideration: None    Labs:  Recent Labs     21  0334 21  1714   CREA 1.95* 2.00*   BUN 44* 46*     Recent Labs     21  0334 21  1714   WBC 6.1 7.0     Temp (24hrs), Av.4 °F (36.3 °C), Min:97.4 °F (36.3 °C), Max:97.4 °F (36.3 °C)        Creatinine Clearance (mL/min):   CrCl (Ideal Body Weight): 28.7   If actual weight < IBW: CrCl (Actual Body Weight) 35.0    Impression/Plan:   Adjusted cefepime to 2 g q12h for CrCl between 30-60 mL/min  Adjusted metronidazole dose to 500 mg q12h per protocol  Vancomycin 2000 mg loading dose given   Start vancomycin 1000 mg q24h for projected AUC of 557 and trough of 18.7  Antimicrobial stop date TBD     Pharmacy will follow daily and adjust medications as appropriate for renal function and/or serum levels.     Thank you,  Dionte Dudley, PHARMD    Vancomycin Dosing Document    Documents located on pharmacy Teams site: Clinical Practice -> Antimicrobial Stewardship -> Antibiotics_Vancomycin     Aminoglycoside Dosing Document    Documents located on pharmacy Teams site: Clinical Practice -> Antimicrobial Stewardship -> Antibiotics_Aminoglycosides

## 2021-12-28 NOTE — CONSULTS
Podiatry History and Physical    Subjective:         Date of Consultation:  December 28th, 2021    Referring Physician: Dr. Claudio GASPAR    Patient is a 80 y.o. male who is being seen for diabetic infected right 4th toe ulceration and osteomyelitis . Workup has revealed interdigital ulceration lateral 4th right toe with necrotic and fibrotic ulceration with deep tracking to the bone. Patient seen in the room verbally responsive reports to have noticed the wound few weeks back does have some pain but tolerable. Never seen a podiatrist and fairly well controlled diabetic has hx of vascular sx on the left by Dr. Adelaide Dennison but never had eval on the right. MR evidence of Osteo on the left 4th toe and possible septic joint of the 5th, clinically no ulceration present or any signs of infectious process on the 5th toe is evident     Patient Active Problem List    Diagnosis Date Noted    Osteomyelitis of right foot ([address] 12/28/2021    Hypertension     Diabetes ([address]     Liver disease     CAD (coronary artery disease)     Arthritis     Carotid artery stenosis, symptomatic 03/22/2012     Past Medical History:   Diagnosis Date    Arthritis     CAD (coronary artery disease)     coronary stent    Diabetes ([address]     iddm newly on insulin 3/2012    Hypertension     Liver disease       Family History   Problem Relation Age of Onset    No Known Problems Mother     No Known Problems Father       Social History     Tobacco Use    Smoking status: Never Smoker    Smokeless tobacco: Never Used   Substance Use Topics    Alcohol use: No     Past Surgical History:   Procedure Laterality Date    HX APPENDECTOMY      HX CHOLECYSTECTOMY      HX ORTHOPAEDIC      Rt.  Knee cartlidge removal    HX TONSILLECTOMY      HX UROLOGICAL      Rt Orchiectomy    [place] CARDIAC SURG PROCEDURE UNLIST  1997    stents    [place] COLON [place] SCRN NOT  [address] IND  10/29/2013         VASCULAR SURGERY PROCEDURE UNLIST      carotid artery endarterectomy Prior to Admission medications    Medication Sig Start Date End Date Taking? Authorizing Provider   gabapentin (NEURONTIN) 600 mg tablet Take 600 mg by mouth two (2) times a day. Reynold Sepulveda MD   hydroCHLOROthiazide (HYDRODIURIL) 25 mg tablet Take 25 mg by mouth nightly. Reynold Sepulveda MD   finasteride (PROSCAR) 5 mg tablet Take 5 mg by mouth daily. eRynold Sepulveda MD   amLODIPine (NORVASC) 5 mg tablet Take 5 mg by mouth daily. Reynold Sepulveda MD   tamsulosin (FLOMAX) 0.4 mg capsule Take 0.4 mg by mouth nightly. Reynold Sepulveda MD   cholecalciferol (Vitamin D3) 25 mcg (1,000 unit) cap Take 1,000 Units by mouth daily. Reynold Sepulveda MD   insulin glargine (Lantus Solostar U-100 Insulin) 100 unit/mL (3 mL) inpn 15 Units by SubCUTAneous route daily. Scott Orellana MD   levoFLOXacin [facility]) 750 mg tablet Take 750 mg by mouth every other day. 3/29/21   Reynold Sepulveda MD   clindamycin (CLEOCIN) 300 mg capsule Take 1 Cap by mouth four (4) times daily. 4/10/21   Vero Arredondo MD   methocarbamoL (Robaxin-750) 750 mg tablet Take 1 Tab by mouth four (4) times daily as needed for Muscle Spasm(s). 4/10/21   Vero Arredondo MD   aspirin delayed-release 81 mg tablet Take 81 mg by mouth daily. Provider, Historical   multivitamin (ONE A DAY) tablet Take 1 Tab by mouth daily. Provider, Historical   atenolol (TENORMIN) 50 mg tablet Take 100 mg by mouth daily. Reynold Sepulveda MD   lisinopril (PRINIVIL, ZESTRIL) 40 mg tablet Take 40 mg by mouth daily. Reynold Sepulveda MD   lovastatin (MEVACOR) 10 mg tablet Take 40 mg by mouth nightly. Reynold Sepulveda MD   metformin (GLUCOPHAGE) 1,000 mg tablet Take 1,000 mg by mouth two (2) times daily (with meals). Reynold Sepulveda MD   glipiZIDE (GLUCOTROL) 10 mg tablet Take 10 mg by mouth two (2) times a day. Reynold Sepulveda MD     Allergies   Allergen Reactions    Penicillins Rash        Review of Systems:  Pertinent items are noted in HPI.     Objective:     Patient Vitals for the past 8 hrs:   BP Temp Pulse Resp SpO2   22 1148 117/63 98.4 °F (36.9 °C) 71 18 95 %   22 0807 (!) 143/72 98.2 °F (36.8 °C) 74 18 97 %   22 0746 132/61 98.7 °F (37.1 °C) 71 18 94 %     Temp (24hrs), Av.2 °F (36.8 °C), Min:97.5 °F (36.4 °C), Max:98.7 °F (37.1 °C)      General:  alert, cooperative, no distress, appears stated age  no erythema  no ecchymosis  no tenderness  DP absent right, PT absent right, trophic changes right foot and ulceration at inter dgital grade 4 necrotic with deep tracking to the bone, No evidence of skin break or open skin lesion or erythema or edema evident on the right th toe     Data Review:   Recent Results (from the past 24 hour(s))   GLUCOSE, POC    Collection Time: 22  4:19 PM   Result Value Ref Range    Glucose (POC) 129 (H) 65 - 117 mg/dL    Performed by An Singh PCT    GLUCOSE, POC    Collection Time: 22  9:09 PM   Result Value Ref Range    Glucose (POC) 241 (H) 65 - 117 mg/dL    Performed by Asher Lobo [facility]    [place] Alexa, TROUGH    Collection Time: 22 10:48 PM   Result Value Ref Range    Vancomycin,trough 12.4 (H) 5.0 - 10.0 ug/mL    Reported dose date 2021      Reported dose time:       Reported dose: 1000 MG UNITS   GLUCOSE, POC    Collection Time: 22  6:40 AM   Result Value Ref Range    Glucose (POC) 140 (H) 65 - 117 mg/dL    Performed by Asher Lobo PCT    GLUCOSE, POC    Collection Time: 22 11:53 AM   Result Value Ref Range    Glucose (POC) 189 (H) 65 - 117 mg/dL    Performed by Pascual Trotter PCT      INDICATION: Swelling infection 4th and 5th toe concern for osteomyelitis     COMPARISON: Radiographs 2021     EXAM: Sagittal T1, STIR, post IV contrast-enhanced T1 fat saturated; axial T1,  STIR, T1 fat saturated; coronal T1, STIR, post IV contrast-enhanced T1 fat  saturated MR images of the right forefoot and midfoot.     IV CONTRAST: 15 mL ProHance.     FINDINGS: Subcutaneous edema is shown throughout the dorsal forefoot extending  into the fourth toe and dorsal fifth toe with soft tissue enhancement in the  fourth and fifth toes. There is also edema and enhancement along the lateral  aspect of the forefoot at the level of the metatarsal rows and within the  intrinsic foot muscles.     There is abnormal signal with enhancement demonstrated in the shaft through head  of the fourth toe proximal phalanx, fourth toe middle phalanx, and base of  fourth toe distal phalanx. Subtle enhancing effusion is demonstrated in the  fifth toe PIP joint with similar signal changes at the apposing cortical margins  of bone.     Incidental note is made of the presence of first and second TMT as well as first  MTP and IP osteoarthrosis.     IMPRESSION  1. Osteomyelitis of the fourth toe proximal, middle and distal phalanges. 2. Query early septic arthritis of fifth toe PIP joint. Impression:     diabetes - neurological manifestation and peripheral vascular disease    Osteomyelitis right 4th toe with grade 4 necrotic ulceration       Recommendation:   Spoke to patient in the room and advised surgical treatment of the 4th toe with an amputation and most likely no treatment is required for the 5th toe at this point unless any changes further. Verbally consented for the same and also advised patient will get a vascular sx consult to evaluate perfusion for the right leg aand foot. Patient education for diabetes - neurological manifestation and peripheral vascular disease. I would like to thank Dr. Beltran Arteaga and nursing staff for assistance in the team approach and care for the patient.           Received consults will see patient this evening

## 2021-12-28 NOTE — PROGRESS NOTES
CM attempted to meet with pt to complete assessment, pt MIO at this time.     Amee Olson [facility] [address]

## 2021-12-28 NOTE — ED NOTES
Bedside shift change report given to FLYNN Serrano (oncoming nurse) by Jason Fields (offgoing nurse). Report included the following information SBAR, ED Summary, Intake/Output, MAR and Recent Results.

## 2021-12-28 NOTE — ED PROVIDER NOTES
EMERGENCY DEPARTMENT HISTORY AND PHYSICAL EXAM      Date: 12/27/2021  Patient Name: Tonya Li. History of Presenting Illness     Chief Complaint   Patient presents with    Toe Pain     right foot sore toe a week or so ago; right fourth and fifth toe sore that has open wounds. he has history of same on the left leg and had circulation problem Dr. Brandan Hernandes did surgery for this.  Foot Pain     burning tingling of right foot. History Provided By: Patient    HPI: Daniel Vee Sr., 80 y.o. male presents to the ED with cc of right foot pain and swelling. Patient states he developed a sore between the fourth and fifth toes on his right foot approximately week ago. He states he is a diabetic but sugars have been well controlled. He states since he noticed the sore he has had increased pain of the foot that he states is a 9 out of 10 when he is attempting to ambulate however rates it a 4 out of 10 at rest.  He states he has noted some burning and tingling of the foot and has been on gabapentin in the past for neuropathy. He states over the last 2 days he has noticed increased redness and swelling of the right foot and the swelling is now extended to the lower calf. He denies any fever or chills. He denies any chest pain or shortness of breath. He denies any abdominal pain, nausea, vomiting or diarrhea. He states he has been seen by Dr. Brandan Hernandes in the past for peripheral arterial disease in the left leg that resulted in a procedure in addition to amputation of one of the digits. There are no other complaints, changes, or physical findings at this time. PCP: Dina Delacruz MD    No current facility-administered medications on file prior to encounter. Current Outpatient Medications on File Prior to Encounter   Medication Sig Dispense Refill    gabapentin (NEURONTIN) 600 mg tablet Take 600 mg by mouth two (2) times a day.       hydroCHLOROthiazide (HYDRODIURIL) 25 mg tablet Take 25 mg by mouth nightly.  finasteride (PROSCAR) 5 mg tablet Take 5 mg by mouth daily.  amLODIPine (NORVASC) 5 mg tablet Take 5 mg by mouth daily.  tamsulosin (FLOMAX) 0.4 mg capsule Take 0.4 mg by mouth nightly.  cholecalciferol (Vitamin D3) 25 mcg (1,000 unit) cap Take 1,000 Units by mouth daily.  insulin glargine (Lantus Solostar U-100 Insulin) 100 unit/mL (3 mL) inpn 15 Units by SubCUTAneous route daily.  levoFLOXacin (LEVAQUIN) 750 mg tablet Take 750 mg by mouth every other day.  clindamycin (CLEOCIN) 300 mg capsule Take 1 Cap by mouth four (4) times daily. 40 Cap 0    methocarbamoL (Robaxin-750) 750 mg tablet Take 1 Tab by mouth four (4) times daily as needed for Muscle Spasm(s). 20 Tab 0    aspirin delayed-release 81 mg tablet Take 81 mg by mouth daily.  multivitamin (ONE A DAY) tablet Take 1 Tab by mouth daily.  atenolol (TENORMIN) 50 mg tablet Take 100 mg by mouth daily.  lisinopril (PRINIVIL, ZESTRIL) 40 mg tablet Take 40 mg by mouth daily.  lovastatin (MEVACOR) 10 mg tablet Take 40 mg by mouth nightly.  metformin (GLUCOPHAGE) 1,000 mg tablet Take 1,000 mg by mouth two (2) times daily (with meals).  glipiZIDE (GLUCOTROL) 10 mg tablet Take 10 mg by mouth two (2) times a day. Past History     Past Medical History:  Past Medical History:   Diagnosis Date    Arthritis     CAD (coronary artery disease)     coronary stent    [address] [address]     iddm newly on insulin 3/2012    Hypertension     Liver disease        Past Surgical History:  Past Surgical History:   Procedure Laterality Date    CARDIAC SURG PROCEDURE UNLIST  1997    stents    HX APPENDECTOMY      HX CHOLECYSTECTOMY      HX ORTHOPAEDIC      Rt.  Knee cartlidge removal    HX TONSILLECTOMY      HX UROLOGICAL      Rt Orchiectomy    [place] COLON CA SCRN NOT [facility] IND  10/29/2013         VASCULAR SURGERY PROCEDURE UNLIST      carotid artery endarterectomy       Family History:  No family history on file. Social History:  Social History     Tobacco Use    Smoking status: Never Smoker    Smokeless tobacco: Never Used   Substance Use Topics    Alcohol use: No    Drug use: No       Allergies: Allergies   Allergen Reactions    Penicillins Rash         Review of Systems   Review of Systems   Constitutional: Negative. Negative for appetite change, chills, fatigue and fever. HENT: Negative. Negative for congestion, rhinorrhea, sinus pressure and sore throat. Eyes: Negative. Respiratory: Negative. Negative for cough, choking, chest tightness, shortness of breath and wheezing. Cardiovascular: Negative. Negative for chest pain, palpitations and leg swelling. Gastrointestinal: Negative for abdominal pain, constipation, diarrhea, nausea and vomiting. Endocrine: Negative. Genitourinary: Negative. Negative for difficulty urinating, dysuria, flank pain and urgency. Musculoskeletal: Negative. Right foot pain and swelling       Skin: Positive for wound (right 4th and 5th digit ). Neurological: Negative. Negative for dizziness, speech difficulty, weakness, light-headedness, numbness and headaches. Psychiatric/Behavioral: Negative. All other systems reviewed and are negative. Physical Exam   Physical Exam  Vitals and nursing note reviewed. Constitutional:       General: He is not in acute distress. Appearance: He is well-developed. He is not diaphoretic. HENT:      Head: Normocephalic and atraumatic. Mouth/Throat:      Pharynx: No oropharyngeal exudate. Eyes:      Conjunctiva/sclera: Conjunctivae normal.      Pupils: Pupils are equal, round, and reactive to light. Neck:      Vascular: No JVD. Trachea: No tracheal deviation. Cardiovascular:      Rate and Rhythm: Normal rate and regular rhythm. Heart sounds: Normal heart sounds. No murmur heard.       Pulmonary:      Effort: Pulmonary effort is normal. No respiratory distress. Breath sounds: Normal breath sounds. No stridor. No wheezing or rales. Chest:      Chest wall: No tenderness. Abdominal:      General: There is no distension. Palpations: Abdomen is soft. Tenderness: There is no abdominal tenderness. There is no guarding or rebound. Musculoskeletal:         General: Normal range of motion. Cervical back: Normal range of motion and neck supple. Comments: Right foot with soft tissue swelling there is redness of the fourth and fifth digit and redness to the top of the foot extending over the distal fourth and fifth metatarsals. There is swelling of the right lower extremity to the distal calf. Posterior tibial and dorsalis pedis pulses present   Skin:     General: Skin is warm and dry. Capillary Refill: Capillary refill takes less than 2 seconds. Neurological:      Mental Status: He is alert and oriented to person, place, and time. Cranial Nerves: No cranial nerve deficit. Comments: No gross motor or sensory deficits    Psychiatric:         Behavior: Behavior normal.             Diagnostic Study Results     Labs -     Recent Results (from the past 12 hour(s))   METABOLIC PANEL, COMPREHENSIVE    Collection Time: 12/27/21  5:14 PM   Result Value Ref Range    Sodium 137 136 - 145 mmol/L    Potassium 4.5 3.5 - 5.1 mmol/L    Chloride 102 97 - 108 mmol/L    CO2 29 21 - 32 mmol/L    Anion gap 6 5 - 15 mmol/L    Glucose 333 (H) 65 - 100 mg/dL    BUN 46 (H) 6 - 20 MG/DL    Creatinine 2.00 (H) 0.70 - 1.30 MG/DL    BUN/Creatinine ratio 23 (H) 12 - 20      GFR est AA 39 (L) >60 ml/min/1.73m2    GFR est non-AA 32 (L) >60 ml/min/1.73m2    Calcium 10.2 (H) 8.5 - 10.1 MG/DL    Bilirubin, total 0.9 0.2 - 1.0 MG/DL    ALT (SGPT) 22 12 - 78 U/L    AST (SGOT) 16 15 - 37 U/L    Alk.  phosphatase 69 45 - 117 U/L    Protein, total 8.0 6.4 - 8.2 g/dL    Albumin 3.5 3.5 - 5.0 g/dL    Globulin 4.5 (H) 2.0 - 4.0 g/dL    A-G Ratio 0.8 (L) 1.1 - 2.2 CBC WITH AUTOMATED DIFF    Collection Time: 12/27/21  5:14 PM   Result Value Ref Range    WBC 7.0 4.1 - 11.1 K/uL    RBC 4.23 4. 10 - 5.70 M/uL    HGB 13.0 12.1 - 17.0 g/dL    HCT 37.9 36.6 - 50.3 %    MCV 89.6 80.0 - 99.0 FL    MCH 30.7 26.0 - 34.0 PG    MCHC 34.3 30.0 - 36.5 g/dL    RDW 12.6 11.5 - 14.5 %    PLATELET 184 057 - 755 K/uL    MPV 9.9 8.9 - 12.9 FL    NRBC 0.0 0  WBC    ABSOLUTE NRBC 0.00 0.00 - 0.01 K/uL    NEUTROPHILS 68 32 - 75 %    LYMPHOCYTES 22 12 - 49 %    MONOCYTES 6 5 - 13 %    EOSINOPHILS 3 0 - 7 %    BASOPHILS 1 0 - 1 %    IMMATURE GRANULOCYTES 0 0.0 - 0.5 %    ABS. NEUTROPHILS 4.8 1.8 - 8.0 K/UL    ABS. LYMPHOCYTES 1.5 0.8 - 3.5 K/UL    ABS. MONOCYTES 0.4 0.0 - 1.0 K/UL    ABS. EOSINOPHILS 0.2 0.0 - 0.4 K/UL    ABS. BASOPHILS 0.1 0.0 - 0.1 K/UL    ABS. IMM. GRANS. 0.0 0.00 - 0.04 K/UL    DF AUTOMATED     PROTHROMBIN TIME + INR    Collection Time: 12/27/21  5:14 PM   Result Value Ref Range    INR 1.0 0.9 - 1.1      Prothrombin time 10.9 9.0 - 11.1 sec   SED RATE (ESR)    Collection Time: 12/27/21  7:32 PM   Result Value Ref Range    Sed rate, automated 74 (H) 0 - 20 mm/hr       Radiologic Studies -   XR CHEST PORT   Final Result   No acute process identified. MRI FOOT RT W WO CONT         XR FOOT RT MIN 3 V   Final Result        CT Results  (Last 48 hours)    None        CXR Results  (Last 48 hours)    None          Medical Decision Making   I am the first provider for this patient. I reviewed the vital signs, available nursing notes, past medical history, past surgical history, family history and social history. Vital Signs-Reviewed the patient's vital signs.   Patient Vitals for the past 12 hrs:   Temp Pulse Resp BP SpO2   12/27/21 1659 97.4 °F (36.3 °C) 82 14 (!) 156/73 99 %       Records Reviewed: Nursing Notes, Old Medical Records, Previous Radiology Studies and Previous Laboratory Studies    Provider Notes (Medical Decision Making):   DDx- Cellulitis, osteomyelitis, bacteremia       ED Course:   Initial assessment performed. The patients presenting problems have been discussed, and they are in agreement with the care plan formulated and outlined with them. I have encouraged them to ask questions as they arise throughout their visit. Initial routine blood work have been ordered from triage we will add on blood cultures in addition to a sed rate CRP. After evaluation of the patient will order an MRI with and without contrast to evaluate for osteomyelitis. Patient sed rate is elevated will start IV antibiotics including vancomycin and cefepime. Consult note:  Given concern for osteomyelitis discussed with hospitalist who will evaluate and admit the patient. Preliminary results of the MRI does show concerns for osteomyelitis of the fourth toe no clear osteomyelitis of the fifth digit. Disposition:  Admit    Diagnosis     Clinical Impression:   1. Osteomyelitis of right foot, unspecified type ([address]    2. Cellulitis of right lower extremity        Attestations:    Rand Beverly, DO    Please note that this dictation was completed with [employer], the computer voice recognition software. Quite often unanticipated grammatical, syntax, homophones, and other interpretive errors are inadvertently transcribed by the computer software. Please disregard these errors. Please excuse any errors that have escaped final proofreading. Thank you.

## 2021-12-28 NOTE — PROGRESS NOTES
Physical Therapy    Received PT order. Pt arrived in ED late last night and is still undergoing workup for osteomyelitis R foot, awaiting podiatry consult. Pt noted to be in severe pain on arrival. Will await podiatry recommendations/finalization of work up and WB status prior to initiation of eval.    Sue Samule Habermann, PT    1400: pt for possible surgery tomorrow for foot; will follow post surgery as indicated. Will need WB status.     Sacha Calderon, PT

## 2021-12-28 NOTE — PROGRESS NOTES
Attempted to see pt for OT services. Pt is pending podiatry consult due to painful foot wound.   Will await their recommendations prior to start of OT eval.

## 2021-12-28 NOTE — H&P
Hospitalist Admission Note    NAME: Roxie Patel Sr.   :  1940   MRN:  [id number]     Date/Time:  2021 12:28 AM    Patient PCP: Mariana Guillen MD  ______________________________________________________________________  Given the patient's current clinical presentation, I have a high level of concern for decompensation if discharged from the emergency department. Complex decision making was performed, which includes reviewing the patient's available past medical records, laboratory results, and x-ray films. Assessment / Plan:      Osteomyelitis of right foot ([address] (2021)  Admit to surgical medical unit  Start cefepime, vancomycin, and Flagyl IV  Podiatry consult  Pain management   Blood cultures pending as well as C-reactive protein and sed rate. Hypertension ()  Currently stable  Continue to monitor  Continue home antihypertensive medications-Norvasc, atenolol, hydrochlorothiazide, lisinopril  Check lipid panel      Diabetes (HCC) ()  POC before meals and at bedtime blood glucose monitoring with sliding scale coverage  Holding oral antihypoglycemics for now  Continue home Lantus  Check hemoglobin A1c            Code Status:  Full code    Surrogate Decision Maker:  Sydnee Shukla (daughter) [phone]    DVT Prophylaxis: Lovenox  GI Prophylaxis: Famotidine    Activity at baseline: cane    Lives with: self          Subjective:     CHIEF COMPLAINT: Right toe pain    HISTORY OF PRESENT ILLNESS:     Juan Carlos North is a 80 y.o. WHITE/NON- male with medical history including but not limited to hypertension, diabetes, carotid artery stenosis, liver disease, coronary artery disease, arthritis presents to the emergency department with 5-day history of gradual Darryl deteriorating right toe pain.   Patient states erythema to right fourth and fifth started 2 days before [place] and it has gradually worsened and pain has become intolerable which prompted him to present to the emergency department. MRI of right foot was done and preliminary report state Marrow edema in the proximal and middle phalanx of the fourth toe with enhancement and overlying soft tissue swelling concerning for acute osteomyelitis. No clear osteomyelitis of the fifth toe and no soft tissue  abscess        We were asked to admit for work up and evaluation of the above problems. Past Medical History:   Diagnosis Date    Arthritis     CAD (coronary artery disease)     coronary stent    Diabetes ([address]     iddm newly on insulin 3/2012    Hypertension     Liver disease         Past Surgical History:   Procedure Laterality Date    HX APPENDECTOMY      HX CHOLECYSTECTOMY      HX ORTHOPAEDIC      Rt. Knee cartlidge removal    HX TONSILLECTOMY      HX UROLOGICAL      Rt Orchiectomy    [place] CARDIAC SURG PROCEDURE UNLIST  1997    stents    [place] COLON CA SCRN NOT HI RSK IND  10/29/2013         VASCULAR SURGERY PROCEDURE UNLIST      carotid artery endarterectomy       Social History     Tobacco Use    Smoking status: Never Smoker    Smokeless tobacco: Never Used   Substance Use Topics    Alcohol use: No        Family History   Problem Relation Age of Onset    No Known Problems Mother     No Known Problems Father      Allergies   Allergen Reactions    Penicillins Rash        Prior to Admission medications    Medication Sig Start Date End Date Taking? Authorizing Provider   gabapentin (NEURONTIN) 600 mg tablet Take 600 mg by mouth two (2) times a day. Reynold Sepulveda MD   hydroCHLOROthiazide (HYDRODIURIL) 25 mg tablet Take 25 mg by mouth nightly. Reynold Sepulveda MD   finasteride (PROSCAR) 5 mg tablet Take 5 mg by mouth daily. Reynold Sepulveda MD   amLODIPine (NORVASC) 5 mg tablet Take 5 mg by mouth daily. Reynold Sepulveda MD   tamsulosin (FLOMAX) 0.4 mg capsule Take 0.4 mg by mouth nightly. Reynold Sepulveda MD   cholecalciferol (Vitamin D3) 25 mcg (1,000 unit) cap Take 1,000 Units by mouth daily. Reynold Sepulveda MD   insulin glargine (Lantus Solostar U-100 Insulin) 100 unit/mL (3 mL) inpn 15 Units by SubCUTAneous route daily. Shashank Andre MD   levoFLOXacin [facility]) 750 mg tablet Take 750 mg by mouth every other day. 3/29/21   Reynold Sepulveda MD   clindamycin (CLEOCIN) 300 mg capsule Take 1 Cap by mouth four (4) times daily. 4/10/21   David Starks MD   methocarbamoL (Robaxin-750) 750 mg tablet Take 1 Tab by mouth four (4) times daily as needed for Muscle Spasm(s). 4/10/21   David Starks MD   aspirin delayed-release 81 mg tablet Take 81 mg by mouth daily. Provider, Historical   multivitamin (ONE A DAY) tablet Take 1 Tab by mouth daily. Provider, Historical   atenolol (TENORMIN) 50 mg tablet Take 100 mg by mouth daily. Reynold Sepulveda MD   lisinopril (PRINIVIL, ZESTRIL) 40 mg tablet Take 40 mg by mouth daily. Reynold Sepulveda MD   lovastatin (MEVACOR) 10 mg tablet Take 40 mg by mouth nightly. Reynold Sepulveda MD   metformin (GLUCOPHAGE) 1,000 mg tablet Take 1,000 mg by mouth two (2) times daily (with meals). Reynold Sepulveda MD   glipiZIDE (GLUCOTROL) 10 mg tablet Take 10 mg by mouth two (2) times a day. Reynold Sepulveda MD       REVIEW OF SYSTEMS:         Review of Systems   Constitutional: Negative for chills, diaphoresis, fatigue and fever. HENT: Negative for sore throat. Respiratory: Negative for cough, shortness of breath and wheezing. Cardiovascular: Negative for chest pain and palpitations. Gastrointestinal: Negative for abdominal pain, constipation, diarrhea, nausea and vomiting. Genitourinary: Negative for difficulty urinating, dysuria, frequency and hematuria. Musculoskeletal: Positive for arthralgias. Negative for back pain. Skin: Positive for color change and wound. Neurological: Negative for dizziness, syncope, speech difficulty and headaches.               Objective:   VITALS:    Visit Vitals  BP (!) 110/56   Pulse 79   Temp 97.4 °F (36.3 °C)   Resp 14   Ht 5' 8\" (1.727 m)   Wt 83.3 kg (183 lb 10.3 oz)   SpO2 96%   BMI 27.92 kg/m²           Physical Exam  HENT:      Head: Normocephalic and atraumatic. Right Ear: External ear normal.      Left Ear: External ear normal.      Nose: Nose normal.      Mouth/Throat:      Mouth: Mucous membranes are moist.   Eyes:      Conjunctiva/sclera: Conjunctivae normal.   Cardiovascular:      Rate and Rhythm: Normal rate and regular rhythm. Pulmonary:      Effort: Pulmonary effort is normal.      Breath sounds: Normal breath sounds. Abdominal:      General: Bowel sounds are normal.      Palpations: Abdomen is soft. Tenderness: There is no abdominal tenderness. Musculoskeletal:      Cervical back: Neck supple. Right lower leg: No edema. Left lower leg: No edema. Skin:     General: Skin is warm and dry. Capillary Refill: Capillary refill takes less than 2 seconds. Findings: Erythema (Right fourth and fifth toe) and wound (Between right fourth and fifth toe) present. Neurological:      Mental Status: He is alert and oriented to person, place, and time. Motor: No weakness. Gait: Gait normal.   Psychiatric:         Mood and Affect: Mood normal.         Behavior: Behavior normal. Behavior is cooperative. Procedures: see electronic medical records for all procedures/Xrays and details which were not copied into this note but were reviewed prior to creation of Plan. Recent Imaging studies(If Any)    CXR Results  (Last 48 hours)               12/27/21 2227  XR CHEST PORT Final result    Impression:  No acute process identified. Narrative:  Clinical history: admission   INDICATION:   admission   COMPARISON: 2/16/2017       FINDINGS:   AP portable upright view of the chest demonstrates a stable  cardiopericardial   silhouette. There is no pleural effusion. .There is no focal consolidation. .There   is no pneumothorax. .                   Echo Results  (Last 48 hours) None           CT Results  (Last 48 hours)    None           EKG RESULTS     ** No results found for the last 720 hours. **               Recent Microbiology Data(If Any)  . All Micro Results     Procedure Component Value Units Date/Time    COVID-19 RAPID TEST [[id number]] Collected: 12/27/21 2104    Order Status: Completed Specimen: Nasopharyngeal Updated: 12/27/21 2148     Specimen source Nasopharyngeal        COVID-19 rapid test Not detected        Comment: Rapid Abbott ID Now       Rapid NAAT:  The specimen is NEGATIVE for SARS-CoV-2, the novel coronavirus associated with COVID-19. Negative results should be treated as presumptive and, if inconsistent with clinical signs and symptoms or necessary for patient management, should be tested with an alternative molecular assay. Negative results do not preclude SARS-CoV-2 infection and should not be used as the sole basis for patient management decisions. This test has been authorized by the FDA under an Emergency Use Authorization (EUA) for use by authorized laboratories.    Fact sheet for Healthcare Providers: too.medate.co.nz  Fact sheet for Patients: too.medate.co.nz       Methodology: Isothermal Nucleic Acid Amplification         CULTURE, BLOOD, PAIRED [[id number]] Collected: 12/27/21 1932    Order Status: Completed Specimen: Blood Updated: 12/27/21 1951             LAB DATA REVIEWED:    Recent Results (from the past 24 hour(s))   METABOLIC PANEL, COMPREHENSIVE    Collection Time: 12/27/21  5:14 PM   Result Value Ref Range    Sodium 137 136 - 145 mmol/L    Potassium 4.5 3.5 - 5.1 mmol/L    Chloride 102 97 - 108 mmol/L    CO2 29 21 - 32 mmol/L    Anion gap 6 5 - 15 mmol/L    Glucose 333 (H) 65 - 100 mg/dL    BUN 46 (H) 6 - 20 MG/DL    Creatinine 2.00 (H) 0.70 - 1.30 MG/DL    BUN/Creatinine ratio 23 (H) 12 - 20      GFR est AA 39 (L) >60 ml/min/1.73m2    GFR est non-AA 32 (L) >60 ml/min/1.73m2    Calcium 10. 2 (H) 8.5 - 10.1 MG/DL    Bilirubin, total 0.9 0.2 - 1.0 MG/DL    ALT (SGPT) 22 12 - 78 U/L    AST (SGOT) 16 15 - 37 U/L    Alk. phosphatase 69 45 - 117 U/L    Protein, total 8.0 6.4 - 8.2 g/dL    Albumin 3.5 3.5 - 5.0 g/dL    Globulin 4.5 (H) 2.0 - 4.0 g/dL    A-G Ratio 0.8 (L) 1.1 - 2.2     CBC WITH AUTOMATED DIFF    Collection Time: 12/27/21  5:14 PM   Result Value Ref Range    WBC 7.0 4.1 - 11.1 K/uL    RBC 4.23 4. 10 - 5.70 M/uL    HGB 13.0 12.1 - 17.0 g/dL    HCT 37.9 36.6 - 50.3 %    MCV 89.6 80.0 - 99.0 FL    MCH 30.7 26.0 - 34.0 PG    MCHC 34.3 30.0 - 36.5 g/dL    RDW 12.6 11.5 - 14.5 %    PLATELET 015 324 - 744 K/uL    MPV 9.9 8.9 - 12.9 FL    NRBC 0.0 0  WBC    ABSOLUTE NRBC 0.00 0.00 - 0.01 K/uL    NEUTROPHILS 68 32 - 75 %    LYMPHOCYTES 22 12 - 49 %    MONOCYTES 6 5 - 13 %    EOSINOPHILS 3 0 - 7 %    BASOPHILS 1 0 - 1 %    IMMATURE GRANULOCYTES 0 0.0 - 0.5 %    ABS. NEUTROPHILS 4.8 1.8 - 8.0 K/UL    ABS. LYMPHOCYTES 1.5 0.8 - 3.5 K/UL    ABS. MONOCYTES 0.4 0.0 - 1.0 K/UL    ABS. EOSINOPHILS 0.2 0.0 - 0.4 K/UL    ABS. BASOPHILS 0.1 0.0 - 0.1 K/UL    ABS. IMM.  GRANS. 0.0 0.00 - 0.04 K/UL    DF AUTOMATED     PROTHROMBIN TIME + INR    Collection Time: 12/27/21  5:14 PM   Result Value Ref Range    INR 1.0 0.9 - 1.1      Prothrombin time 10.9 9.0 - 11.1 sec   SED RATE (ESR)    Collection Time: 12/27/21  7:32 PM   Result Value Ref Range    Sed rate, automated 74 (H) 0 - 20 mm/hr   COVID-19 RAPID TEST    Collection Time: 12/27/21  9:04 PM   Result Value Ref Range    Specimen source Nasopharyngeal      COVID-19 rapid test Not detected NOTD                    _______________________________________________________________________  Care Plan discussed with:    Comments   Patient x    Family      RN     Care Manager                    Consultant:      _______________________________________________________________________  Expected  Disposition:   Home with Family x   HH/PT/OT/RN    SNF/LTC    [facility] ________________________________________________________________________  TOTAL TIME:  39 Minutes    Critical Care Provided     Minutes non procedure based      Comments    x Reviewed previous records   >50% of visit spent in counseling and coordination of care x Discussion with patient and/or family and questions answered           Patient hemodynamically stable at time of admission    ________________________________________________________________________  Signed: Sudarshan Gonzalez DNP, ACNP-BC    Please note that this note was dictated using Dragon computer voice recognition software. Quite often unanticipated grammatical, syntax, homophones, and other interpretive errors are inadvertently transcribed by the computer software. Please disregard these errors. Please excuse any errors that have escaped final proofreading.

## 2021-12-28 NOTE — ACP (ADVANCE CARE PLANNING)
Advance Care Planning Note      NAME: Jose Maria Castillo Sr.   :  1940   MRN:  [id number]     Date/Time:  2021 12:28 AM    Active Diagnoses:  Hospital Problems  Date Reviewed: 2021          Codes Class Noted POA    * (Principal) Osteomyelitis of right foot ([facility] [address] ICD-10-CM: M86.9  ICD-9-CM: 730.27  2021 Unknown        Hypertension ICD-10-CM: I10  ICD-9-CM: 401.9  Unknown Yes        Diabetes ([facility] [address] ICD-10-CM: E11.9  ICD-9-CM: 250.00  Unknown Yes    Overview Signed 2021 12:31 AM by ALONSO Burgos     iddm newly on insulin 3/2012                   These active diagnoses are of sufficient risk that focused discussion on advance care planning is indicated in order to allow the patient to thoughtfully consider personal goals of care, and if situations arise that prevent the ability to personally give input, to ensure appropriate representation of their personal desires for different levels and aggressiveness of care. Discussion:   Code status addressed and wants to be a Full Code. Patient wants central line and vasopressors if needed. Patient would also want a feeding tube, if needed, for nutritional support. Patient  would like to assign Edel Meneses (daughter) [phone]  as the surrogate decision maker. Persons present and participating in discussion: [address] [place] [address]., ALONSO Mckeon      Time Spent:   Total time spent face-to-face in education and discussion:  18  minutes.          Margie Roberson DNP, ALONSO-BC   Hospitalist

## 2021-12-28 NOTE — PROGRESS NOTES
TRANSFER - IN REPORT:    Verbal report received from Sridhar (name) on Alejandra Camacho Sr.  being received from ED (unit) for routine progression of care      Report consisted of patients Situation, Background, Assessment and   Recommendations(SBAR). Information from the following report(s) SBAR, Kardex, Procedure Summary, Intake/Output, MAR and Recent Results was reviewed with the receiving nurse. Opportunity for questions and clarification was provided. Assessment completed upon patients arrival to unit and care assumed.

## 2021-12-28 NOTE — PROGRESS NOTES
Seen patient in the room     scheduled for sx amputation of the 4th toe at 5:30 PM    No evidence of acute infection no deep wound on the 5th toe will evaluate further on Thursday     Npo 12/30/21 after breakfast

## 2021-12-28 NOTE — PROGRESS NOTES
Patient seen and examined. Patient already admitted for osteomyelitis of her right fourth toe.   Discussed with Dr. Grant Otto, possible surgery tomorrow, n.p.o. after midnight  Continue vanc cefepime, Flagyl

## 2021-12-29 LAB
ANION GAP SERPL CALC-SCNC: 6 MMOL/L (ref 5–15)
BASOPHILS # BLD: 0.1 K/UL (ref 0–0.1)
BASOPHILS NFR BLD: 1 % (ref 0–1)
BUN SERPL-MCNC: 47 MG/DL (ref 6–20)
BUN/CREAT SERPL: 24 (ref 12–20)
CALCIUM SERPL-MCNC: 9 MG/DL (ref 8.5–10.1)
CHLORIDE SERPL-SCNC: 110 MMOL/L (ref 97–108)
CO2 SERPL-SCNC: 23 MMOL/L (ref 21–32)
CREAT SERPL-MCNC: 1.98 MG/DL (ref 0.7–1.3)
DIFFERENTIAL METHOD BLD: ABNORMAL
EOSINOPHIL # BLD: 0.3 K/UL (ref 0–0.4)
EOSINOPHIL NFR BLD: 4 % (ref 0–7)
ERYTHROCYTE [DISTWIDTH] IN BLOOD BY AUTOMATED COUNT: 12.5 % (ref 11.5–14.5)
GLUCOSE BLD STRIP.AUTO-MCNC: 148 MG/DL (ref 65–117)
GLUCOSE BLD STRIP.AUTO-MCNC: 203 MG/DL (ref 65–117)
GLUCOSE BLD STRIP.AUTO-MCNC: 213 MG/DL (ref 65–117)
GLUCOSE BLD STRIP.AUTO-MCNC: 248 MG/DL (ref 65–117)
GLUCOSE SERPL-MCNC: 160 MG/DL (ref 65–100)
HCT VFR BLD AUTO: 33.2 % (ref 36.6–50.3)
HGB BLD-MCNC: 11.1 G/DL (ref 12.1–17)
IMM GRANULOCYTES # BLD AUTO: 0 K/UL (ref 0–0.04)
IMM GRANULOCYTES NFR BLD AUTO: 0 % (ref 0–0.5)
LEFT ABI: 1.08
LEFT ARM BP: 143 MMHG
LEFT POSTERIOR TIBIAL: 150 MMHG
LEFT TBI: 0.72
LEFT TOE PRESSURE: 103 MMHG
LYMPHOCYTES # BLD: 1.9 K/UL (ref 0.8–3.5)
LYMPHOCYTES NFR BLD: 29 % (ref 12–49)
MCH RBC QN AUTO: 30.6 PG (ref 26–34)
MCHC RBC AUTO-ENTMCNC: 33.4 G/DL (ref 30–36.5)
MCV RBC AUTO: 91.5 FL (ref 80–99)
MONOCYTES # BLD: 0.5 K/UL (ref 0–1)
MONOCYTES NFR BLD: 8 % (ref 5–13)
NEUTS SEG # BLD: 3.9 K/UL (ref 1.8–8)
NEUTS SEG NFR BLD: 58 % (ref 32–75)
NRBC # BLD: 0 K/UL (ref 0–0.01)
NRBC BLD-RTO: 0 PER 100 WBC
PLATELET # BLD AUTO: 143 K/UL (ref 150–400)
PMV BLD AUTO: 9.6 FL (ref 8.9–12.9)
POTASSIUM SERPL-SCNC: 4.4 MMOL/L (ref 3.5–5.1)
RBC # BLD AUTO: 3.63 M/UL (ref 4.1–5.7)
RIGHT ABI: 0.87
RIGHT POSTERIOR TIBIAL: 123 MMHG
RIGHT TBI: 0.26
RIGHT TOE PRESSURE: 37 MMHG
SERVICE CMNT-IMP: ABNORMAL
SODIUM SERPL-SCNC: 139 MMOL/L (ref 136–145)
VAS LEFT DORSALIS PEDIS BP: 155 MMHG
VAS RIGHT DORSALIS PEDIS BP: 125 MMHG
WBC # BLD AUTO: 6.7 K/UL (ref 4.1–11.1)

## 2021-12-29 PROCEDURE — 74011636637 HC RX REV CODE- 636/637: Performed by: NURSE PRACTITIONER

## 2021-12-29 PROCEDURE — 82962 GLUCOSE BLOOD TEST: CPT

## 2021-12-29 PROCEDURE — 80048 BASIC METABOLIC PNL TOTAL CA: CPT

## 2021-12-29 PROCEDURE — 94760 N-INVAS EAR/PLS OXIMETRY 1: CPT

## 2021-12-29 PROCEDURE — 80202 ASSAY OF VANCOMYCIN: CPT

## 2021-12-29 PROCEDURE — 74011250637 HC RX REV CODE- 250/637: Performed by: NURSE PRACTITIONER

## 2021-12-29 PROCEDURE — 74011250636 HC RX REV CODE- 250/636: Performed by: INTERNAL MEDICINE

## 2021-12-29 PROCEDURE — 85025 COMPLETE CBC W/AUTO DIFF WBC: CPT

## 2021-12-29 PROCEDURE — 74011250636 HC RX REV CODE- 250/636: Performed by: GENERAL ACUTE CARE HOSPITAL

## 2021-12-29 PROCEDURE — 74011000258 HC RX REV CODE- 258: Performed by: INTERNAL MEDICINE

## 2021-12-29 PROCEDURE — 65270000029 HC RM PRIVATE

## 2021-12-29 RX ORDER — ENOXAPARIN SODIUM 100 MG/ML
40 INJECTION SUBCUTANEOUS ONCE
Status: COMPLETED | OUTPATIENT
Start: 2021-12-29 | End: 2021-12-29

## 2021-12-29 RX ADMIN — ATORVASTATIN CALCIUM 10 MG: 10 TABLET, FILM COATED ORAL at 23:10

## 2021-12-29 RX ADMIN — HYDROCHLOROTHIAZIDE 25 MG: 25 TABLET ORAL at 23:30

## 2021-12-29 RX ADMIN — CHOLECALCIFEROL TAB 25 MCG (1000 UNIT) 1000 UNITS: 25 TAB at 09:05

## 2021-12-29 RX ADMIN — FAMOTIDINE 20 MG: 20 TABLET, FILM COATED ORAL at 09:05

## 2021-12-29 RX ADMIN — SODIUM CHLORIDE, PRESERVATIVE FREE 10 ML: 5 INJECTION INTRAVENOUS at 05:40

## 2021-12-29 RX ADMIN — ACETAMINOPHEN 650 MG: 325 TABLET ORAL at 16:00

## 2021-12-29 RX ADMIN — ACETAMINOPHEN 650 MG: 325 TABLET ORAL at 08:02

## 2021-12-29 RX ADMIN — GABAPENTIN 600 MG: 300 CAPSULE ORAL at 09:05

## 2021-12-29 RX ADMIN — LISINOPRIL 40 MG: 20 TABLET ORAL at 09:06

## 2021-12-29 RX ADMIN — TAMSULOSIN HYDROCHLORIDE 0.4 MG: 0.4 CAPSULE ORAL at 23:10

## 2021-12-29 RX ADMIN — SODIUM CHLORIDE, PRESERVATIVE FREE 10 ML: 5 INJECTION INTRAVENOUS at 13:10

## 2021-12-29 RX ADMIN — FAMOTIDINE 20 MG: 20 TABLET, FILM COATED ORAL at 18:40

## 2021-12-29 RX ADMIN — METRONIDAZOLE 500 MG: 500 INJECTION, SOLUTION INTRAVENOUS at 13:05

## 2021-12-29 RX ADMIN — METRONIDAZOLE 500 MG: 500 INJECTION, SOLUTION INTRAVENOUS at 02:52

## 2021-12-29 RX ADMIN — CEFEPIME 2 G: 2 INJECTION, POWDER, FOR SOLUTION INTRAVENOUS at 04:31

## 2021-12-29 RX ADMIN — CEFEPIME 2 G: 2 INJECTION, POWDER, FOR SOLUTION INTRAVENOUS at 16:00

## 2021-12-29 RX ADMIN — GABAPENTIN 600 MG: 300 CAPSULE ORAL at 18:40

## 2021-12-29 RX ADMIN — Medication 2 UNITS: at 23:10

## 2021-12-29 RX ADMIN — Medication 3 UNITS: at 16:00

## 2021-12-29 RX ADMIN — AMLODIPINE BESYLATE 5 MG: 5 TABLET ORAL at 09:05

## 2021-12-29 RX ADMIN — Medication 3 UNITS: at 13:04

## 2021-12-29 RX ADMIN — SODIUM CHLORIDE, PRESERVATIVE FREE 10 ML: 5 INJECTION INTRAVENOUS at 22:00

## 2021-12-29 RX ADMIN — ENOXAPARIN SODIUM 40 MG: 100 INJECTION SUBCUTANEOUS at 09:52

## 2021-12-29 RX ADMIN — VANCOMYCIN HYDROCHLORIDE 1000 MG: 1 INJECTION, POWDER, LYOPHILIZED, FOR SOLUTION INTRAVENOUS at 23:48

## 2021-12-29 RX ADMIN — ATENOLOL 100 MG: 100 TABLET ORAL at 09:06

## 2021-12-29 RX ADMIN — INSULIN GLARGINE 15 UNITS: 100 INJECTION, SOLUTION SUBCUTANEOUS at 23:10

## 2021-12-29 RX ADMIN — Medication 2 UNITS: at 09:03

## 2021-12-29 NOTE — PROGRESS NOTES
End of Shift Note    Bedside shift change report given to Juan Carlos Rodriges (oncoming nurse) by Argenis Correa (offgoing nurse). Report included the following information SBAR, Kardex, Procedure Summary, Intake/Output, MAR and Recent Results    Shift worked:  7a-7p     Shift summary and any significant changes:     Pt arrived to unit from ED. Received scheduled antibiotics. Medicated for mild pain with tylenol. Concerns for physician to address:       Zone phone for oncoming shift:          Activity:  Activity Level: Up with Assistance  Number times ambulated in hallways past shift: 0  Number of times OOB to chair past shift: 0    Cardiac:   Cardiac Monitoring: No      Cardiac Rhythm: Sinus Rhythm    Access:   Current line(s): PIV     Genitourinary:   Urinary status: voiding    Respiratory:   O2 Device: None  Chronic home O2 use?: NO       GI:  Last Bowel Movement Date: 12/27/21  Current diet:  ADULT DIET Regular; 3 carb choices (45 gm/meal); No Salt Added (3-4 gm)  DIET NPO  Passing flatus: YES  Tolerating current diet: YES       Pain Management:   Patient states pain is manageable on current regimen: YES    Skin:  Altaf Score: 19  Interventions: increase time out of bed    Patient Safety:  Fall Score:  Total Score: 2  Interventions: gripper socks and pt to call before getting OOB       Length of Stay:  Expected LOS: - - -  Actual LOS: [address]

## 2021-12-29 NOTE — PROGRESS NOTES
Physical Therapy    Pt now scheduled for 4th toe amp and further eval of 5th tomorrow. +osteomyelitis. Will follow as appropriate after surgery.     Jose Douglas, PT

## 2021-12-29 NOTE — PROGRESS NOTES
End of Shift Note    Bedside shift change report given to [address] (oncoming nurse) by Ruthie Joiner RN (offgoing nurse). Report included the following information SBAR, Kardex and MAR    Shift worked:  7PM-7AM     Shift summary and any significant changes:     patient continues on scheduled IV abt therapy. Surgery scheduled for Thursday now. No pain voiced. Voiding without any issues.       Concerns for physician to address:       Zone phone for oncoming shift:

## 2021-12-29 NOTE — PROGRESS NOTES
Occupational Therapy note:    Chart reviewed and noted patient scheduled tomorrow for 4th toe amputation with further eval of 5th. Patient also noted to have osteomyelitis. Will defer and follow up following surgery.     Marcell Foote, OTR/L

## 2021-12-29 NOTE — PROGRESS NOTES
Vascular Surgery Consult Note  12/29/2021    Subjective:     Mr. Marquis Lewis has a pmhx significant for HTN, ASHD, liver disease, DM, and carotid disease. [facility] is s/p right CEA (3/2012). [facility] has known PAD and is left MADDISON atherectomy and BAP on 9/21/2021 that has since resolved. His post procedure STELLA was 1.01. He had now had RLE interventions. His baseline right STELLA is 0.85 w/ a TBI of 0.29. He is now admitted to the hospital with a wound between the 4 & 5th digits of the right foot after being seen by Dr. Twin Mary. He had failed outpatient treatment with oral antibiotics we have been asked to evaluate his PAD. Past medical history  Peripheral arterial disease  Diabetes mellitus  -With neuropathy  Coronary artery disease  -History of cardiac stenting  Hypertension  Carotid artery disease  BPH    Past procedural history  Left AV TA atherectomy and balloon angioplasty 9/2021  Right carotid endarterectomy  Appendectomy  Cholecystectomy  Removal of cartilage from right knee  Tonsillectomy  Right orchiectomy    Family history  Unknown    Social history  Smoker denies alcohol use. He is routinely independent of his ADLs and IADLs. Home medication  gabapentin (NEURONTIN) 600 mg tablet Take 600 mg by mouth two (2) times a day. hydroCHLOROthiazide (HYDRODIURIL) 25 mg tablet Take 25 mg by mouth nightly. finasteride (PROSCAR) 5 mg tablet Take 5 mg by mouth daily. amLODIPine (NORVASC) 5 mg tablet Take 5 mg by mouth daily. tamsulosin (FLOMAX) 0.4 mg capsule Take 0.4 mg by mouth nightly. cholecalciferol (Vitamin D3) 25 mcg (1,000 unit) cap Take 1,000 Units by mouth daily. insulin glargine (Lantus Solostar U-100 Insulin) 100 unit/mL (3 mL) inpn 15 Units by SubCUTAneous route daily. levoFLOXacin (LEVAQUIN) 750 mg tablet Take 750 mg by mouth every other day. clindamycin (CLEOCIN) 300 mg capsule Take 1 Cap by mouth four (4) times daily.    methocarbamoL (Robaxin-750) 750 mg tablet Take 1 Tab by mouth four (4) times daily as needed for Muscle Spasm(s). aspirin delayed-release 81 mg tablet Take 81 mg by mouth daily. multivitamin (ONE A DAY) tablet Take 1 Tab by mouth daily. atenolol (TENORMIN) 50 mg tablet Take 100 mg by mouth daily. lisinopril (PRINIVIL, ZESTRIL) 40 mg tablet Take 40 mg by mouth daily. lovastatin (MEVACOR) 10 mg tablet Take 40 mg by mouth nightly. metformin (GLUCOPHAGE) 1,000 mg tablet Take 1,000 mg by mouth two (2) times daily (with meals). glipiZIDE (GLUCOTROL) 10 mg tablet Take 10 mg by mouth two (2) times a day. Allergies  Penicillins: Rash     Review of Systems   Constitutional: Negative for activity change, chills, diaphoresis, fatigue and fever. HENT: Negative for congestion. Eyes: Negative for visual disturbance. Respiratory: Negative for cough and shortness of breath. Cardiovascular: Positive for leg swelling (right foot). Negative for chest pain. Gastrointestinal: Negative for abdominal pain, nausea and vomiting. Endocrine: Negative for polydipsia and polyuria. Genitourinary: Negative. Musculoskeletal: Positive for gait problem. Negative for myalgias. Skin: Positive for wound (Right foot fourth and fifth digits). Allergic/Immunologic: Negative for immunocompromised state. Neurological: Negative for weakness. Hematological: Negative. Psychiatric/Behavioral: Negative. Objective:     Patient Vitals for the past 24 hrs:   BP Temp Pulse Resp SpO2   12/29/21 1114 103/60 97.7 °F (36.5 °C) 66 18 97 %   12/29/21 0808 (!) 121/54 97.7 °F (36.5 °C) 67 19 98 %   12/29/21 0258 (!) 118/53 98.3 °F (36.8 °C) 68 18 100 %   12/28/21 1928 (!) 105/51 97.8 °F (36.6 °C) 71 19 96 %   12/28/21 1605 (!) 103/49 97.9 °F (36.6 °C) 67 18 96 %        Physical Exam  HENT:      Head: Normocephalic. Nose: Nose normal.      Mouth/Throat:      Mouth: Mucous membranes are moist.   Eyes:      Pupils: Pupils are equal, round, and reactive to light.    Cardiovascular: Rate and Rhythm: Normal rate and regular rhythm. Pulses:           Femoral pulses are 2+ on the right side and 2+ on the left side. Popliteal pulses are 2+ on the right side and 2+ on the left side. Dorsalis pedis pulses are 0 on the right side and 2+ on the left side. Posterior tibial pulses are 0 on the right side and 2+ on the left side. Pulmonary:      Effort: Pulmonary effort is normal. No respiratory distress. Abdominal:      General: Abdomen is flat. There is no distension. Musculoskeletal:         General: Normal range of motion. Cervical back: Normal range of motion. Skin:     General: Skin is warm. Neurological:      General: No focal deficit present. Mental Status: He is alert. Mental status is at baseline.    Psychiatric:         Mood and Affect: Mood normal.         Behavior: Behavior normal.       Pertinent Test Results:   Recent Results (from the past 24 hour(s))   GLUCOSE, POC    Collection Time: 12/28/21  4:09 PM   Result Value Ref Range    Glucose (POC) 175 (H) 65 - 117 mg/dL    Performed by Lauryn oFntana PCT    GLUCOSE, POC    Collection Time: 12/28/21  8:55 PM   Result Value Ref Range    Glucose (POC) 173 (H) 65 - 117 mg/dL    Performed by Marine Josias PCT    GLUCOSE, POC    Collection Time: 12/28/21 11:49 PM   Result Value Ref Range    Glucose (POC) 129 (H) 65 - 117 mg/dL    Performed by Marine Josias PCT    CBC WITH AUTOMATED DIFF    Collection Time: 12/29/21  3:03 AM   Result Value Ref Range    WBC 6.7 4.1 - 11.1 K/uL    RBC 3.63 (L) 4.10 - 5.70 M/uL    HGB 11.1 (L) 12.1 - 17.0 g/dL    HCT 33.2 (L) 36.6 - 50.3 %    MCV 91.5 80.0 - 99.0 FL    MCH 30.6 26.0 - 34.0 PG    MCHC 33.4 30.0 - 36.5 g/dL    RDW 12.5 11.5 - 14.5 %    PLATELET 932 (L) 816 - 400 K/uL    MPV 9.6 8.9 - 12.9 FL    NRBC 0.0 0  WBC    ABSOLUTE NRBC 0.00 0.00 - 0.01 K/uL    NEUTROPHILS 58 32 - 75 %    LYMPHOCYTES 29 12 - 49 %    MONOCYTES 8 5 - 13 %    EOSINOPHILS 4 0 - 7 % BASOPHILS 1 0 - 1 %    IMMATURE GRANULOCYTES 0 0.0 - 0.5 %    ABS. NEUTROPHILS 3.9 1.8 - 8.0 K/UL    ABS. LYMPHOCYTES 1.9 0.8 - 3.5 K/UL    ABS. MONOCYTES 0.5 0.0 - 1.0 K/UL    ABS. EOSINOPHILS 0.3 0.0 - 0.4 K/UL    ABS. BASOPHILS 0.1 0.0 - 0.1 K/UL    ABS. IMM. GRANS. 0.0 0.00 - 0.04 K/UL    DF AUTOMATED     METABOLIC PANEL, BASIC    Collection Time: 12/29/21  3:03 AM   Result Value Ref Range    Sodium 139 136 - 145 mmol/L    Potassium 4.4 3.5 - 5.1 mmol/L    Chloride 110 (H) 97 - 108 mmol/L    CO2 23 21 - 32 mmol/L    Anion gap 6 5 - 15 mmol/L    Glucose 160 (H) 65 - 100 mg/dL    BUN 47 (H) 6 - 20 MG/DL    Creatinine 1.98 (H) 0.70 - 1.30 MG/DL    BUN/Creatinine ratio 24 (H) 12 - 20      GFR est AA 40 (L) >60 ml/min/1.73m2    GFR est non-AA 33 (L) >60 ml/min/1.73m2    Calcium 9.0 8.5 - 10.1 MG/DL   GLUCOSE, POC    Collection Time: 12/29/21  7:59 AM   Result Value Ref Range    Glucose (POC) 148 (H) 65 - 117 mg/dL    Performed by Trey Moore (RN)    GLUCOSE, POC    Collection Time: 12/29/21 11:35 AM   Result Value Ref Range    Glucose (POC) 248 (H) 65 - 117 mg/dL    Performed by Hesham Zabala PCT        Assessmen/Plan:     Right leg peripheral arterial disease with ulceration complicated by acute osteomyelitis  · Patient may proceed with surgical intervention as deemed appropriate by podiatry. We will plan for right leg arteriogram when the procedure schedule allows. Wound care and debridement per podiatry. Antibiotics per primary team.    Carotid stenosis  · Asymptomatic. Routine outpatient surveillance in September 2022 as previously scheduled.     Uncontrolled diabetes mellitus with hyperglycemia  -Hemoglobin A1c 11.8    Chronic renal disease stage III  -Baseline creatinine 1.83  BPH  -Flomax    Coronary artery disease  Hypertension  -Stable on CCB and ACE  Hyperlipidemia  -Lipitor    Anemia of chronic disease  -Stable  Thrombocytopenia nPOA    GERD  -H2 blocker    · Management of comorbid conditions per primary team.    VTE Prophylaxis:  Held for procedure in the a.m.     Disposition:  Home with home health    Signed By: Kiarra Cintron NP     December 29, 2021

## 2021-12-30 ENCOUNTER — ANESTHESIA (OUTPATIENT)
Dept: SURGERY | Age: 81
DRG: 617 | End: 2021-12-30
Payer: MEDICARE

## 2021-12-30 ENCOUNTER — ANESTHESIA EVENT (OUTPATIENT)
Dept: SURGERY | Age: 81
DRG: 617 | End: 2021-12-30
Payer: MEDICARE

## 2021-12-30 LAB
ANION GAP SERPL CALC-SCNC: 6 MMOL/L (ref 5–15)
BUN SERPL-MCNC: 45 MG/DL (ref 6–20)
BUN/CREAT SERPL: 24 (ref 12–20)
CALCIUM SERPL-MCNC: 8.9 MG/DL (ref 8.5–10.1)
CHLORIDE SERPL-SCNC: 112 MMOL/L (ref 97–108)
CO2 SERPL-SCNC: 20 MMOL/L (ref 21–32)
CREAT SERPL-MCNC: 1.84 MG/DL (ref 0.7–1.3)
GLUCOSE BLD STRIP.AUTO-MCNC: 104 MG/DL (ref 65–117)
GLUCOSE BLD STRIP.AUTO-MCNC: 106 MG/DL (ref 65–117)
GLUCOSE BLD STRIP.AUTO-MCNC: 118 MG/DL (ref 65–117)
GLUCOSE BLD STRIP.AUTO-MCNC: 182 MG/DL (ref 65–117)
GLUCOSE BLD STRIP.AUTO-MCNC: 94 MG/DL (ref 65–117)
GLUCOSE SERPL-MCNC: 106 MG/DL (ref 65–100)
POTASSIUM SERPL-SCNC: 4.1 MMOL/L (ref 3.5–5.1)
SERVICE CMNT-IMP: ABNORMAL
SERVICE CMNT-IMP: ABNORMAL
SERVICE CMNT-IMP: NORMAL
SODIUM SERPL-SCNC: 138 MMOL/L (ref 136–145)
VANCOMYCIN SERPL-MCNC: 15.8 UG/ML

## 2021-12-30 PROCEDURE — 87186 SC STD MICRODIL/AGAR DIL: CPT

## 2021-12-30 PROCEDURE — 74011250636 HC RX REV CODE- 250/636: Performed by: NURSE ANESTHETIST, CERTIFIED REGISTERED

## 2021-12-30 PROCEDURE — 88305 TISSUE EXAM BY PATHOLOGIST: CPT

## 2021-12-30 PROCEDURE — 74011250637 HC RX REV CODE- 250/637: Performed by: GENERAL ACUTE CARE HOSPITAL

## 2021-12-30 PROCEDURE — 87077 CULTURE AEROBIC IDENTIFY: CPT

## 2021-12-30 PROCEDURE — 74011250637 HC RX REV CODE- 250/637: Performed by: NURSE PRACTITIONER

## 2021-12-30 PROCEDURE — 82962 GLUCOSE BLOOD TEST: CPT

## 2021-12-30 PROCEDURE — 80048 BASIC METABOLIC PNL TOTAL CA: CPT

## 2021-12-30 PROCEDURE — 74011250636 HC RX REV CODE- 250/636: Performed by: PODIATRIST

## 2021-12-30 PROCEDURE — 74011636637 HC RX REV CODE- 636/637: Performed by: NURSE PRACTITIONER

## 2021-12-30 PROCEDURE — 87205 SMEAR GRAM STAIN: CPT

## 2021-12-30 PROCEDURE — 87102 FUNGUS ISOLATION CULTURE: CPT

## 2021-12-30 PROCEDURE — 77030038692 HC WND DEB SYS IRMX -B: Performed by: PODIATRIST

## 2021-12-30 PROCEDURE — 76010000138 HC OR TIME 0.5 TO 1 HR: Performed by: PODIATRIST

## 2021-12-30 PROCEDURE — 76210000006 HC OR PH I REC 0.5 TO 1 HR: Performed by: PODIATRIST

## 2021-12-30 PROCEDURE — 77030002916 HC SUT ETHLN J&J -A: Performed by: PODIATRIST

## 2021-12-30 PROCEDURE — 77030006788 HC BLD SAW OSC STRY -B: Performed by: PODIATRIST

## 2021-12-30 PROCEDURE — 74011636637 HC RX REV CODE- 636/637: Performed by: PODIATRIST

## 2021-12-30 PROCEDURE — 87075 CULTR BACTERIA EXCEPT BLOOD: CPT

## 2021-12-30 PROCEDURE — 77030000032 HC CUF TRNQT ZIMM -B: Performed by: PODIATRIST

## 2021-12-30 PROCEDURE — 87106 FUNGI IDENTIFICATION YEAST: CPT

## 2021-12-30 PROCEDURE — 87185 SC STD ENZYME DETCJ PER NZM: CPT

## 2021-12-30 PROCEDURE — 65270000029 HC RM PRIVATE

## 2021-12-30 PROCEDURE — 36415 COLL VENOUS BLD VENIPUNCTURE: CPT

## 2021-12-30 PROCEDURE — 2709999900 HC NON-CHARGEABLE SUPPLY: Performed by: PODIATRIST

## 2021-12-30 PROCEDURE — 74011000258 HC RX REV CODE- 258: Performed by: INTERNAL MEDICINE

## 2021-12-30 PROCEDURE — 74011250637 HC RX REV CODE- 250/637: Performed by: PODIATRIST

## 2021-12-30 PROCEDURE — 74011250636 HC RX REV CODE- 250/636: Performed by: INTERNAL MEDICINE

## 2021-12-30 PROCEDURE — 76060000032 HC ANESTHESIA 0.5 TO 1 HR: Performed by: PODIATRIST

## 2021-12-30 PROCEDURE — 88311 DECALCIFY TISSUE: CPT

## 2021-12-30 PROCEDURE — 74011000250 HC RX REV CODE- 250: Performed by: PODIATRIST

## 2021-12-30 RX ORDER — OXYCODONE HYDROCHLORIDE 5 MG/1
5 TABLET ORAL AS NEEDED
Status: DISCONTINUED | OUTPATIENT
Start: 2021-12-30 | End: 2021-12-31

## 2021-12-30 RX ORDER — LISINOPRIL 20 MG/1
20 TABLET ORAL DAILY
Status: DISCONTINUED | OUTPATIENT
Start: 2021-12-30 | End: 2022-01-03

## 2021-12-30 RX ORDER — ONDANSETRON 2 MG/ML
INJECTION INTRAMUSCULAR; INTRAVENOUS AS NEEDED
Status: DISCONTINUED | OUTPATIENT
Start: 2021-12-30 | End: 2021-12-30 | Stop reason: HOSPADM

## 2021-12-30 RX ORDER — FAMOTIDINE 20 MG/1
20 TABLET, FILM COATED ORAL DAILY
Status: DISCONTINUED | OUTPATIENT
Start: 2021-12-30 | End: 2022-01-04 | Stop reason: HOSPADM

## 2021-12-30 RX ORDER — ENOXAPARIN SODIUM 100 MG/ML
40 INJECTION SUBCUTANEOUS DAILY
Status: DISCONTINUED | OUTPATIENT
Start: 2021-12-31 | End: 2022-01-04 | Stop reason: HOSPADM

## 2021-12-30 RX ORDER — PROPOFOL 10 MG/ML
INJECTION, EMULSION INTRAVENOUS AS NEEDED
Status: DISCONTINUED | OUTPATIENT
Start: 2021-12-30 | End: 2021-12-30 | Stop reason: HOSPADM

## 2021-12-30 RX ORDER — ONDANSETRON 2 MG/ML
4 INJECTION INTRAMUSCULAR; INTRAVENOUS AS NEEDED
Status: DISCONTINUED | OUTPATIENT
Start: 2021-12-30 | End: 2022-01-03 | Stop reason: HOSPADM

## 2021-12-30 RX ORDER — HYDROCODONE BITARTRATE AND ACETAMINOPHEN 7.5; 325 MG/1; MG/1
1 TABLET ORAL
Status: DISCONTINUED | OUTPATIENT
Start: 2021-12-30 | End: 2022-01-04 | Stop reason: HOSPADM

## 2021-12-30 RX ORDER — SODIUM CHLORIDE, SODIUM LACTATE, POTASSIUM CHLORIDE, CALCIUM CHLORIDE 600; 310; 30; 20 MG/100ML; MG/100ML; MG/100ML; MG/100ML
INJECTION, SOLUTION INTRAVENOUS
Status: DISCONTINUED | OUTPATIENT
Start: 2021-12-30 | End: 2021-12-30 | Stop reason: HOSPADM

## 2021-12-30 RX ORDER — FENTANYL CITRATE 50 UG/ML
INJECTION, SOLUTION INTRAMUSCULAR; INTRAVENOUS AS NEEDED
Status: DISCONTINUED | OUTPATIENT
Start: 2021-12-30 | End: 2021-12-30 | Stop reason: HOSPADM

## 2021-12-30 RX ORDER — PROPOFOL 10 MG/ML
INJECTION, EMULSION INTRAVENOUS
Status: DISCONTINUED | OUTPATIENT
Start: 2021-12-30 | End: 2021-12-30 | Stop reason: HOSPADM

## 2021-12-30 RX ORDER — PHENYLEPHRINE HCL IN 0.9% NACL 0.4MG/10ML
SYRINGE (ML) INTRAVENOUS AS NEEDED
Status: DISCONTINUED | OUTPATIENT
Start: 2021-12-30 | End: 2021-12-30 | Stop reason: HOSPADM

## 2021-12-30 RX ORDER — SODIUM CHLORIDE 0.9 % (FLUSH) 0.9 %
5-40 SYRINGE (ML) INJECTION EVERY 8 HOURS
Status: DISCONTINUED | OUTPATIENT
Start: 2021-12-30 | End: 2022-01-04 | Stop reason: HOSPADM

## 2021-12-30 RX ORDER — BUPIVACAINE HYDROCHLORIDE 5 MG/ML
INJECTION, SOLUTION EPIDURAL; INTRACAUDAL AS NEEDED
Status: DISCONTINUED | OUTPATIENT
Start: 2021-12-30 | End: 2022-01-03

## 2021-12-30 RX ORDER — MIDAZOLAM HYDROCHLORIDE 1 MG/ML
INJECTION, SOLUTION INTRAMUSCULAR; INTRAVENOUS AS NEEDED
Status: DISCONTINUED | OUTPATIENT
Start: 2021-12-30 | End: 2021-12-30 | Stop reason: HOSPADM

## 2021-12-30 RX ORDER — FENTANYL CITRATE 50 UG/ML
25 INJECTION, SOLUTION INTRAMUSCULAR; INTRAVENOUS
Status: DISCONTINUED | OUTPATIENT
Start: 2021-12-30 | End: 2022-01-03 | Stop reason: HOSPADM

## 2021-12-30 RX ADMIN — ONDANSETRON HYDROCHLORIDE 4 MG: 2 INJECTION, SOLUTION INTRAMUSCULAR; INTRAVENOUS at 18:04

## 2021-12-30 RX ADMIN — TAMSULOSIN HYDROCHLORIDE 0.4 MG: 0.4 CAPSULE ORAL at 22:41

## 2021-12-30 RX ADMIN — FAMOTIDINE 20 MG: 20 TABLET, FILM COATED ORAL at 08:56

## 2021-12-30 RX ADMIN — CEFEPIME 2 G: 2 INJECTION, POWDER, FOR SOLUTION INTRAVENOUS at 16:18

## 2021-12-30 RX ADMIN — ATENOLOL 100 MG: 100 TABLET ORAL at 08:55

## 2021-12-30 RX ADMIN — INSULIN GLARGINE 15 UNITS: 100 INJECTION, SOLUTION SUBCUTANEOUS at 22:43

## 2021-12-30 RX ADMIN — SODIUM CHLORIDE, POTASSIUM CHLORIDE, SODIUM LACTATE AND CALCIUM CHLORIDE: 600; 310; 30; 20 INJECTION, SOLUTION INTRAVENOUS at 17:24

## 2021-12-30 RX ADMIN — MIDAZOLAM HYDROCHLORIDE 1 MG: 1 INJECTION, SOLUTION INTRAMUSCULAR; INTRAVENOUS at 17:24

## 2021-12-30 RX ADMIN — CHOLECALCIFEROL TAB 25 MCG (1000 UNIT) 1000 UNITS: 25 TAB at 08:56

## 2021-12-30 RX ADMIN — SODIUM CHLORIDE, PRESERVATIVE FREE 10 ML: 5 INJECTION INTRAVENOUS at 22:00

## 2021-12-30 RX ADMIN — Medication 120 MCG: at 17:54

## 2021-12-30 RX ADMIN — CEFEPIME 2 G: 2 INJECTION, POWDER, FOR SOLUTION INTRAVENOUS at 04:03

## 2021-12-30 RX ADMIN — LISINOPRIL 20 MG: 20 TABLET ORAL at 08:55

## 2021-12-30 RX ADMIN — SODIUM CHLORIDE, PRESERVATIVE FREE 10 ML: 5 INJECTION INTRAVENOUS at 05:40

## 2021-12-30 RX ADMIN — ACETAMINOPHEN 650 MG: 325 TABLET ORAL at 08:56

## 2021-12-30 RX ADMIN — PROPOFOL 75 MCG/KG/MIN: 10 INJECTION, EMULSION INTRAVENOUS at 17:28

## 2021-12-30 RX ADMIN — METRONIDAZOLE 500 MG: 500 INJECTION, SOLUTION INTRAVENOUS at 14:24

## 2021-12-30 RX ADMIN — FENTANYL CITRATE 25 MCG: 50 INJECTION, SOLUTION INTRAMUSCULAR; INTRAVENOUS at 17:28

## 2021-12-30 RX ADMIN — Medication 120 MCG: at 17:39

## 2021-12-30 RX ADMIN — PROPOFOL 30 MG: 10 INJECTION, EMULSION INTRAVENOUS at 17:28

## 2021-12-30 RX ADMIN — Medication 2 UNITS: at 12:39

## 2021-12-30 RX ADMIN — Medication 80 MCG: at 17:35

## 2021-12-30 RX ADMIN — METRONIDAZOLE 500 MG: 500 INJECTION, SOLUTION INTRAVENOUS at 02:11

## 2021-12-30 RX ADMIN — HYDROCHLOROTHIAZIDE 25 MG: 25 TABLET ORAL at 22:41

## 2021-12-30 RX ADMIN — FENTANYL CITRATE 25 MCG: 50 INJECTION, SOLUTION INTRAMUSCULAR; INTRAVENOUS at 17:32

## 2021-12-30 RX ADMIN — GABAPENTIN 600 MG: 300 CAPSULE ORAL at 08:56

## 2021-12-30 RX ADMIN — PROPOFOL 20 MG: 10 INJECTION, EMULSION INTRAVENOUS at 17:37

## 2021-12-30 RX ADMIN — SODIUM CHLORIDE, PRESERVATIVE FREE 10 ML: 5 INJECTION INTRAVENOUS at 08:59

## 2021-12-30 RX ADMIN — VANCOMYCIN HYDROCHLORIDE 1000 MG: 1 INJECTION, POWDER, LYOPHILIZED, FOR SOLUTION INTRAVENOUS at 23:30

## 2021-12-30 RX ADMIN — SODIUM CHLORIDE, PRESERVATIVE FREE 10 ML: 5 INJECTION INTRAVENOUS at 14:24

## 2021-12-30 RX ADMIN — ATORVASTATIN CALCIUM 10 MG: 10 TABLET, FILM COATED ORAL at 22:41

## 2021-12-30 NOTE — PERIOP NOTES
TRANSFER - IN REPORT:    Verbal report received from [facility] on Alejandra Camacho Sr.  being received from [phone] for ordered procedure      Report consisted of patients Situation, Background, Assessment and   Recommendations(SBAR). Information from the following report(s) SBAR, ED Summary, Procedure Summary, Intake/Output and MAR was reviewed with the receiving nurse. Opportunity for questions and clarification was provided. Assessment completed upon patients arrival to unit and care assumed.

## 2021-12-30 NOTE — PROGRESS NOTES
End of Shift Note    Bedside shift change report given to [id number] (oncoming nurse) by Sherri Young (offgoing nurse). Report included the following information SBAR, Kardex, Procedure Summary, Intake/Output, MAR and Recent Results    Shift worked:  7a-3p     Shift summary and any significant changes:    Pt NPO after breakfast. CHG bath completed. Consent signed for surgery this afternoon. Up with assistance. Medicated for mild foot pain. Concerns for physician to address:       Zone phone for oncoming shift:          Activity:  Activity Level: Up with Assistance  Number times ambulated in hallways past shift: 0  Number of times OOB to chair past shift: 1    Cardiac:   Cardiac Monitoring: No      Cardiac Rhythm: Sinus Rhythm    Access:   Current line(s): PIV     Genitourinary:   Urinary status: voiding    Respiratory:   O2 Device: None (Room air)  Chronic home O2 use?: NO       GI:  Last Bowel Movement Date: 12/30/21  Current diet:  DIET ONE TIME MESSAGE  DIET NPO  Passing flatus: YES  Tolerating current diet: YES       Pain Management:   Patient states pain is manageable on current regimen: YES    Skin:  Altaf Score: 20  Interventions: increase time out of bed    Patient Safety:  Fall Score: Total Score: 2  Interventions: assistive device (walker, cane, etc), gripper socks and pt to call before getting OOB       Length of Stay:  Expected LOS: 2d 21h  Actual LOS: [address] [facility]      Primary care provided by Hudson Pham RN and supervised by me. Agree with documentation and assessments.

## 2021-12-30 NOTE — PROGRESS NOTES
End of Shift Note    Bedside shift change report given to Sophie Contreras RN (oncoming nurse) by Vivian Barrett LPN (offgoing nurse). Report included the following information SBAR, Kardex, Procedure Summary, Intake/Output, MAR and Recent Results    Shift worked:  7p-730a     Shift summary and any significant changes:     Pt rested in bed all night w/ no complaints. Antibiotics ran as ordered. Pt had no other complaints and is awaiting surgery today. Concerns for physician to address:       Zone phone for oncoming shift:          Activity:  Activity Level: Up with Assistance  Number times ambulated in hallways past shift: 0  Number of times OOB to chair past shift: 0    Cardiac:   Cardiac Monitoring: No      Cardiac Rhythm: Sinus Rhythm    Access:   Current line(s): PIV     Genitourinary:   Urinary status: voiding    Respiratory:   O2 Device: None (Room air)  Chronic home O2 use?: NO  Incentive spirometer at bedside: NO     GI:  Last Bowel Movement Date: 12/27/21  Current diet:  DIET ONE TIME MESSAGE  ADULT DIET Regular; 4 carb choices (60 gm/meal)  DIET NPO  Passing flatus: YES  Tolerating current diet: YES       Pain Management:   Patient states pain is manageable on current regimen: YES    Skin:  Altaf Score: 21  Interventions: float heels and increase time out of bed    Patient Safety:  Fall Score:  Total Score: 2  Interventions: assistive device (walker, cane, etc), gripper socks, pt to call before getting OOB and stay with me (per policy)       Length of Stay:  Expected LOS: 2d 21h  Actual LOS: 2      Vivian Barrett LPN

## 2021-12-30 NOTE — PROGRESS NOTES
Pharmacy Antimicrobial Kinetic Dosing    Indication for Antimicrobials: Osteomyelitis     Current Regimen of Each Antimicrobial:  Vancomycin 1000 mg q24h (Start Date ; Day 4)  Cefepime 2 g IV q12h (Start Date ; Day 4)  Metronidazole 500 mg IV q12h (Start Date ; Day 4)    Previous Antimicrobial Therapy:      Goal Level: AUC: 400-600 mg/hr/Liter/day    Date Dose & Interval Measured (mcg/mL) Predicted AUC/PETRONA    2348 1g q24h 15.8 578                 Date & time of next level:     Dosing calculator used: [employer] calculator    Significant Positive Cultures:    blood: ngsf (pending)    Conditions for Dosing Consideration: None    Labs:  Recent Labs     21  0222 21  0303 21  0334   CREA 1.84* 1.98* 1.95*   BUN 45* 47* 44*     Recent Labs     21  0303 21  0334 21  1714   WBC 6.7 6.1 7.0     Temp (24hrs), Av.9 °F (36.6 °C), Min:97.6 °F (36.4 °C), Max:98.4 °F (36.9 °C)        Creatinine Clearance (mL/min):   CrCl (Ideal Body Weight): 30.5   If actual weight < IBW: CrCl (Actual Body Weight) 37.1    Impression/Plan:   Continue cefepime 2g q12h for CrCl between 30-60 mL/min  Continue metronidazole 500 mg q12h   Estimated AUC at SS of 578 within goal.  Continue current regimen of vancomycin 1g IV q24h  OR planned today for toe amputation  BMP daily  Antimicrobial stop date TBD     Pharmacy will follow daily and adjust medications as appropriate for renal function and/or serum levels.     Thank you,  Yuri Watkins, PHARMD    Vancomycin Dosing Document    Documents located on pharmacy Teams site: Clinical Practice -> Antimicrobial Stewardship -> Antibiotics_Vancomycin     Aminoglycoside Dosing Document    Documents located on pharmacy Teams site: Clinical Practice -> Antimicrobial Stewardship -> Antibiotics_Aminoglycosides

## 2021-12-30 NOTE — PROGRESS NOTES
Physical Therapy Note    Chart reviewed. Patient scheduled for R 4th and 5th toe amputation this date and with known osteomyelitis. Will plan to follow back for PT evaluation post-surgery.     Thank you,  Dennys Linder, PT, DPT

## 2021-12-30 NOTE — PERIOP NOTES
Handoff Report from Operating Room to PACU    Report received from Easton Rasmussen and Airam Luna CRNA regarding Jenni Brown Sr. .      Surgeon(s):  Mercy Zepeda DPM  And Procedure(s) (LRB):  AMPUTATION RIGHT 4TH TOE (Right)  confirmed   with allergies and dressings discussed. Anesthesia type, drugs, patient history, complications, estimated blood loss, vital signs, intake and output, and last pain medication were reviewed.

## 2021-12-30 NOTE — BRIEF OP NOTE
Brief Postoperative Note    Patient: Sarah Saldana Sr. YOB: 1940  MRN: [id number]    Date of Procedure: 12/30/2021     Pre-Op Diagnosis: OSTEOMYELITIS right 4th toe    Post-Op Diagnosis: Same as preoperative diagnosis.       Procedure(s):  AMPUTATION RIGHT 4TH TOE    Surgeon(s):  Alyson Jack DPM    Surgical Assistant: None    Anesthesia: MAC     Estimated Blood Loss (mL): Minimal    Complications: None    Specimens: * No specimens in log *     Implants: * No implants in log *    Drains: * No LDAs found *    Findings: viable margins at the stump    Electronically Signed by Chad Hicks DPM on 12/30/2021 at 6:19 PM

## 2021-12-30 NOTE — PROGRESS NOTES
Physician Progress Note      Trip Wilkinson  [facility] #:                  [id number]  :                       1940  ADMIT DATE:       2021 5:01 PM  [address] [place] DATE:  RESPONDING  PROVIDER #:        Trisha Cormier MD          QUERY Juan Pablo Ryder Afternoon    This patient admitted for Osteomyelitis of the 4th -5th toe. with Known DM with documented A1c of 11.6  The patient also has HTN. The creatinine was noted @ 2.0 on admission. Currently there is no documentation of CKD or history of CKD. .If possible, please document in the progress notes and discharge summary if you are evaluating and/or treating any of the following: The medical record reflects the following:  Risk Factors: DM uncontrolled, HTN, Age of 80, osteo of the 4-5th toe  Clinical Indicators:  Creat on admission  1.98 2.0-GFR of 32-33 since admission, A1 @ 11.8, documented osteomyelitis  Treatment: IVF, Daily labs, blood glucose monitoring, Cont. HTN meds from home including Norvasc, atenolol, hydrochlorothiazide, lisinopril    Thank you  Yuri [place], [address] [facility], [facility], [place]  [phone]    _________________________________________________________________    Defined by Kidney Disease Improving Global Outcomes (KDIGO) clinical practice guideline for acute kidney injury:  -Increase in SCr by greater than or equal to 0.3 mg/dl within 48?hours; or  -Increase or decrease in SCr to greater than or equal to 1.5 times baseline, which is known or presumed to have occurred within the prior 7 days; or  -Urine volume < 0.5ml/kg/h for 6 hours  Options provided:  -- Acute kidney failure  -- Acute on Chronic CKD  -- Chronic CKD  -- Other - I will add my own diagnosis  -- Disagree - Not applicable / Not valid  -- Disagree - Clinically unable to determine / Unknown  -- Refer to Clinical Documentation Reviewer    PROVIDER RESPONSE TEXT:    This patient has Chronic CKD.     Query created by: Talisha Hsu on 2021 3:42 PM      Electronically signed by:  Clarise Leyden MD 12/30/2021 2:58 AM

## 2021-12-30 NOTE — PROGRESS NOTES
TRANSFER - IN REPORT:    Verbal report received from [place] (name) on Roxie Patel Sr.  being received from Pre Op (unit) for ordered procedure      Report consisted of patients Situation, Background, Assessment and   Recommendations(SBAR). Information from the following report(s) SBAR, Kardex, Procedure Summary, Intake/Output, MAR and Recent Results was reviewed with the receiving nurse. Opportunity for questions and clarification was provided. Assessment completed upon patients arrival to unit and care assumed.

## 2021-12-30 NOTE — ANESTHESIA PREPROCEDURE EVALUATION
Relevant Problems   CARDIOVASCULAR   (+) CAD (coronary artery disease)   (+) Hypertension      GASTROINTESTINAL   (+) Liver disease      ENDOCRINE   (+) Arthritis   (+) Diabetes (HCC)      HEMATOLOGY   (+) Osteomyelitis of right foot (HCC)       Anesthetic History   No history of anesthetic complications            Review of Systems / Medical History  Patient summary reviewed, nursing notes reviewed and pertinent labs reviewed    Pulmonary              Pertinent negatives: No COPD, asthma and smoker     Neuro/Psych           Pertinent negatives: No seizures and CVA   Cardiovascular    Hypertension          CAD      Comments: carotid disease s/p right CEA (3/2012).      GI/Hepatic/Renal         Renal disease: CRI  Liver disease     Endo/Other    Diabetes: poorly controlled, type 2    Arthritis and anemia     Other Findings   Comments: thrombocytopenia           Physical Exam    Airway  Mallampati: III  TM Distance: > 6 cm  Neck ROM: normal range of motion   Mouth opening: Normal     Cardiovascular  Regular rate and rhythm,  S1 and S2 normal,  no murmur, click, rub, or gallop  Rhythm: regular  Rate: normal         Dental    Dentition: Edentulous     Pulmonary  Breath sounds clear to auscultation               Abdominal  GI exam deferred       Other Findings            Anesthetic Plan    ASA: 3  Anesthesia type: MAC          Induction: Intravenous  Anesthetic plan and risks discussed with: Patient

## 2021-12-30 NOTE — PROGRESS NOTES
Hospitalist Progress Note    NAME: Stan Alvarez Sr.   :  1940   MRN:  [id number]       Assessment / Plan:    Osteomyelitis of right foot ([address] (2021)  Start cefepime, vancomycin, and Flagyl IV  Podiatry consulted  Pain management   Blood cultures pending as well as C-reactive protein and sed rate. Sx tmw       Hypertension     PVD,  left MADDISON atherectomy and BAP on 2021     CAD, s/p remote stent    S/p LLE    S/p CEA  Continue home antihypertensive medications, atenolol, hydrochlorothiazide,   Lower lisinopril dose  Hold norvasc given soft BP  STELLA right resting STELLA is mildly decreased. Right TBI is 0.26  Rt LE angio as per Vascular       Diabetes  POC before meals and at bedtime blood glucose monitoring with sliding scale coverage  Holding oral antihypoglycemics for now  Increase Lantus after Sx  hemoglobin A1c 11.8      CKD stage III  Renal function close to baseline  Avoid nephrotoxic meds    Code Status:  Full code  Surrogate Decision Maker:  Daiana Metz (daughter) [phone]  DVT Prophylaxis: Lovenox  GI Prophylaxis: Famotidine  Activity at baseline: cane  Lives with: self  Recommended Disposition: Home w/Family  Anticipated Discharge Date:  >48 hours        Subjective:     Discussed with RN events overnight. Afebrile  No CP  No SOB    Review of Systems:  Symptom Y/N Comments  Symptom Y/N Comments   Fever/Chills    Chest Pain     Poor Appetite    Edema     Cough    Abdominal Pain     Sputum    Joint Pain     SOB/LEW    Pruritis/Rash     Nausea/vomit    Tolerating PT/OT     Diarrhea    Tolerating Diet     Constipation    Other       PO intake: No data found.     Wt Readings from Last 10 Encounters:   21 83.3 kg (183 lb 10.3 oz)   04/10/21 88 kg (194 lb 0.1 oz)   19 85.2 kg (187 lb 13.3 oz)   17 91.6 kg (201 lb 15.1 oz)   11/25/15 93.1 kg (205 lb 4 oz)   10/29/13 88 kg (194 lb)   03/24/12 90 kg (198 lb 6 oz)   03/22/12 88.5 kg (195 lb)   03/19/12 88.5 kg (195 lb 1 oz)   05/30/10 90.3 kg (199 lb)       Objective:     VITALS:   Last 24hrs VS reviewed since prior progress note. Most recent are:  Patient Vitals for the past 24 hrs:   Temp Pulse Resp BP SpO2   12/30/21 0223 97.6 °F (36.4 °C) 63 16 107/63    12/29/21 2326 98.1 °F (36.7 °C) 63 16 116/63 96 %   12/29/21 1951 98.4 °F (36.9 °C) 69 18 (!) 107/59 97 %   12/29/21 1449 97.6 °F (36.4 °C) 69 18 96/81 97 %   12/29/21 1114 97.7 °F (36.5 °C) 66 18 103/60 97 %   12/29/21 0808 97.7 °F (36.5 °C) 67 19 (!) 121/54 98 %   12/29/21 0258 98.3 °F (36.8 °C) 68 18 (!) 118/53 100 %       Intake/Output Summary (Last 24 hours) at 12/30/2021 0226  Last data filed at 12/29/2021 1600  Gross per 24 hour   Intake 930 ml   Output 500 ml   Net 430 ml        I had a face to face encounter, and independently examined this patient on 12/30/2021, as outlined below:    PHYSICAL EXAM:  General:    No distress     HEENT: Atraumatic, anicteric sclerae, pink conjunctivae, MMM  Neck:  Supple, symmetrical  Lungs:   CTA. No Wheezing/Rhonchi. No rales. No tenderness. No Accessory muscle use. Heart:   Regular rhythm. No murmur. No JVD. Non palpable DP pulses. GI/:   Soft. NT. ND. BS normal  Extremities: No edema. No cyanosis. No clubbing. Skin:     Not pale. Not Jaundiced. R 4th toe swelling/redness  Psych:  Good insight. Not depressed. Not anxious or agitated. Neurologic: Alert and oriented X 4. EOMs intact. No facial asymmetry. No slurred speech. Symmetrical strength, Sensation grossly intact. Labs     I reviewed today's most current labs and imaging studies.   Pertinent labs include:  Recent Labs     12/29/21  0303 12/28/21  0334 12/27/21  1714   WBC 6.7 6.1 7.0   HGB 11.1* 11.8* 13.0   HCT 33.2* 34.4* 37.9   * 149* 169     Recent Labs     12/29/21  0303 12/28/21  0334 12/27/21  1714    140 137   K 4.4 4.2 4.5   * 108 102   CO2 23 27 29   * 274* 333*   BUN 47* 44* 46*   CREA 1.98* 1.95* 2.00*   CA 9.0 9.5 10.2*   MG  --  2.4  --    ALB  --  3.0* 3.5   TBILI  --  0.8 0.9   ALT  --  19 22   INR  --   --  1.0     XR FOOT RT MIN 3 V    Result Date: 12/27/2021  No acute fracture or dislocation. MRI FOOT RT W WO CONT    Result Date: 12/28/2021  1. Osteomyelitis of the fourth toe proximal, middle and distal phalanges. 2. Query early septic arthritis of fifth toe PIP joint. XR CHEST PORT    Result Date: 12/27/2021  No acute process identified. No results found.        Current Medications:     Current Facility-Administered Medications:     amLODIPine (NORVASC) tablet 5 mg, 5 mg, Oral, DAILY, Purveyor, Atoosa, ACNP, 5 mg at 12/29/21 0905    [Held by provider] aspirin delayed-release tablet 81 mg, 81 mg, Oral, DAILY, Purveyor, Atoosa, ACNP, 81 mg at 12/28/21 8890    atenoloL (TENORMIN) tablet 100 mg, 100 mg, Oral, DAILY, Purveyor, Atoosa, ACNP, 100 mg at 12/29/21 4435    cholecalciferol (VITAMIN D3) (1000 Units /25 mcg) tablet 1,000 Units, 1,000 Units, Oral, DAILY, Purveyor, Atoosa, ACNP, 1,000 Units at 12/29/21 0905    hydroCHLOROthiazide (HYDRODIURIL) tablet 25 mg, 25 mg, Oral, QHS, Purveyor, Atoosa, ACNP, 25 mg at 12/29/21 2330    insulin glargine (LANTUS) injection 15 Units, 15 Units, SubCUTAneous, QHS, Purveyor, Atoosa, ACNP, 15 Units at 12/29/21 2310    lisinopriL (PRINIVIL, ZESTRIL) tablet 40 mg, 40 mg, Oral, DAILY, Purveyor, Atoosa, ACNP, 40 mg at 12/29/21 0906    atorvastatin (LIPITOR) tablet 10 mg, 10 mg, Oral, QHS, Purveyor, Atoosa, ACNP, 10 mg at 12/29/21 2310    tamsulosin (FLOMAX) capsule 0.4 mg, 0.4 mg, Oral, QHS, Purveyor, Atoosa, ACNP, 0.4 mg at 12/29/21 2310    gabapentin (NEURONTIN) capsule 600 mg, 600 mg, Oral, BID, Purveyor, Atoosa, ACNP, 600 mg at 12/29/21 1840    sodium chloride (NS) flush 5-40 mL, 5-40 mL, IntraVENous, Q8H, Purveyor, Atoosa, ACNP, 10 mL at 12/29/21 2200    sodium chloride (NS) flush 5-40 mL, 5-40 mL, IntraVENous, PRN, Purveyor, Atoosa, ACNP    acetaminophen (TYLENOL) tablet 650 mg, 650 mg, Oral, Q6H PRN, 650 mg at 12/29/21 1600 **OR** acetaminophen (TYLENOL) suppository 650 mg, 650 mg, Rectal, Q6H PRN, Purveyor, Atoosa, ACNP    polyethylene glycol (MIRALAX) packet 17 g, 17 g, Oral, DAILY PRN, Purveyor, Atoosa, ACNP    ondansetron (ZOFRAN ODT) tablet 4 mg, 4 mg, Oral, Q8H PRN **OR** ondansetron (ZOFRAN) injection 4 mg, 4 mg, IntraVENous, Q6H PRN, Purveyor, Atoosa, ACNP    [Held by provider] enoxaparin (LOVENOX) injection 40 mg, 40 mg, SubCUTAneous, DAILY, Purveyor, Atoosa, ACNP, 40 mg at 12/28/21 0447    famotidine (PEPCID) tablet 20 mg, 20 mg, Oral, BID, Purveyor, Atoosa, ACNP, 20 mg at 12/29/21 1840    insulin lispro (HUMALOG) injection, , SubCUTAneous, AC&HS, Purveyor, Atoosa, ACNP, 2 Units at 12/29/21 2310    glucose chewable tablet 16 g, 4 Tablet, Oral, PRN, Purveyor, Atoosa, ACNP    dextrose (D50W) injection syrg 12.5-25 g, 12.5-25 g, IntraVENous, PRN, Purveyor, Atoosa, ACNP    glucagon (GLUCAGEN) injection 1 mg, 1 mg, IntraMUSCular, PRN, Purveyor, Atoosa, ACNP    metroNIDAZOLE (FLAGYL) IVPB premix 500 mg, 500 mg, IntraVENous, Q12H, Carmen Harris MD, Last Rate: 100 mL/hr at 12/30/21 0211, 500 mg at 12/30/21 0211    cefepime (MAXIPIME) 2 g in 0.9% sodium chloride (MBP/ADV) 100 mL MBP, 2 g, IntraVENous, Q12H, Carmen Harris MD, Last Rate: 200 mL/hr at 12/29/21 1600, 2 g at 12/29/21 1600    vancomycin (VANCOCIN) 1,000 mg in 0.9% sodium chloride 250 mL (VIAL-MATE), 1,000 mg, IntraVENous, Q24H, Carmen Harris MD, Last Rate: 250 mL/hr at 12/29/21 2348, 1,000 mg at 12/29/21 2348     Procedures: see electronic medical records for all procedures/Xrays and details which were not copied into this note but were reviewed prior to creation of Plan.     Reviewed most current lab test results and cultures  YES  Reviewed most current radiology test results   YES  Review and summation of old records today    NO  Reviewed patient's current orders and MAR    YES  PMH/SH reviewed - no change compared to H&P  ________________________________________________________________________  Care Plan discussed with:    Comments   Patient x    Family      RN     Care Manager x    Consultant                       x Multidiciplinary team rounds were held today with , nursing, pharmacist and clinical coordinator. Patient's plan of care was discussed; medications were reviewed and discharge planning was addressed.      ________________________________________________________________________  Total NON critical care TIME:  34   Minutes    Total CRITICAL CARE TIME Spent:   Minutes non procedure based      Comments   >50% of visit spent in counseling and coordination of care x     This includes time during multidisciplinary rounds if indicated above   ________________________________________________________________________  Darryl Barrett MD

## 2021-12-30 NOTE — PROGRESS NOTES
Vascular Surgery Progress Note  [facility]  12/30/2021       Subjective:     Mr. Meera Mart has a pmhx significant for HTN, ASHD, liver disease, DM, and carotid disease. Randee Ho is s/p right CEA (3/2012). Randee Ho has known PAD and is left MADDISON atherectomy and BAP on 9/21/2021 for ulceration of the left foot that has since resolved. His post procedure STELLA was 1.01. He has not had any RLE interventions. His baseline right STELLA is 0.85 w/ a TBI of 0.29. He is now admitted to the hospital with a wound between the 4 & 5th digits of the right foot after being seen by Dr. Dom Lamas. He had failed outpatient treatment with oral antibiotics. Plan is for debridement of the foot later today by Dr. Dom Lamas. Nursing Data:     Patient Vitals for the past 24 hrs:   BP Temp Pulse Resp SpO2   12/30/21 0824 (!) 125/56 97.7 °F (36.5 °C) 64 17 96 %   12/30/21 0223 107/63 97.6 °F (36.4 °C) 63 16    12/29/21 2326 116/63 98.1 °F (36.7 °C) 63 16 96 %   12/29/21 1951 (!) 107/59 98.4 °F (36.9 °C) 69 18 97 %   12/29/21 1449 96/81 97.6 °F (36.4 °C) 69 18 97 %   12/29/21 1114 103/60 97.7 °F (36.5 °C) 66 18 97 %         Intake/Output Summary (Last 24 hours) at 12/30/2021 1047  Last data filed at 12/29/2021 1600  Gross per 24 hour   Intake 240 ml   Output    Net 240 ml       Exam:     Physical Exam  HENT:      Head: Normocephalic. Nose: Nose normal.      Mouth/Throat:      Mouth: Mucous membranes are moist.   Eyes:      Pupils: Pupils are equal, round, and reactive to light. Cardiovascular:      Rate and Rhythm: Normal rate and regular rhythm. Pulses:           Femoral pulses are 2+ on the right side and 2+ on the left side. Popliteal pulses are 2+ on the right side and 2+ on the left side. Dorsalis pedis pulses are 0 on the right side and 2+ on the left side. Posterior tibial pulses are 0 on the right side and 2+ on the left side.    Pulmonary:      Effort: Pulmonary effort is normal. No respiratory distress. Abdominal:      General: Abdomen is flat. There is no distension. Musculoskeletal:         General: Normal range of motion. Cervical back: Normal range of motion. Skin:     General: Skin is warm. Wound of the 4th and 5th digits with bloody purulent drainage. Neurological:      General: No focal deficit present. Mental Status: He is alert. Mental status is at baseline. Psychiatric:         Mood and Affect: Mood normal.         Behavior: Behavior normal.     Lab Review:     .   Recent Results (from the past 24 hour(s))   GLUCOSE, POC    Collection Time: 12/29/21 11:35 AM   Result Value Ref Range    Glucose (POC) 248 (H) 65 - 117 mg/dL    Performed by [employer] PCT    GLUCOSE, POC    Collection Time: 12/29/21  3:47 PM   Result Value Ref Range    Glucose (POC) 213 (H) 65 - 117 mg/dL    Performed by [employer] PCT    GLUCOSE, POC    Collection Time: 12/29/21  9:35 PM   Result Value Ref Range    Glucose (POC) 203 (H) 65 - 117 mg/dL    Performed by Angelina Olivera (PCT)    VANCOMYCIN, RANDOM    Collection Time: 12/29/21 11:47 PM   Result Value Ref Range    Vancomycin, random 87.2 UG/ML   METABOLIC PANEL, BASIC    Collection Time: 12/30/21  2:22 AM   Result Value Ref Range    Sodium 138 136 - 145 mmol/L    Potassium 4.1 3.5 - 5.1 mmol/L    Chloride 112 (H) 97 - 108 mmol/L    CO2 20 (L) 21 - 32 mmol/L    Anion gap 6 5 - 15 mmol/L    Glucose 106 (H) 65 - 100 mg/dL    BUN 45 (H) 6 - 20 MG/DL    Creatinine 1.84 (H) 0.70 - 1.30 MG/DL    BUN/Creatinine ratio 24 (H) 12 - 20      GFR est AA 43 (L) >60 ml/min/1.73m2    GFR est non-AA 35 (L) >60 ml/min/1.73m2    Calcium 8.9 8.5 - 10.1 MG/DL   GLUCOSE, POC    Collection Time: 12/30/21  7:00 AM   Result Value Ref Range    Glucose (POC) 94 65 - 117 mg/dL    Performed by Angelina Olivera (PCT)           Assessment/Plan:     Right leg peripheral arterial disease with ulceration complicated by acute osteomyelitis  · Patient may proceed with surgical intervention as deemed appropriate later today by podiatry. We will plan for right leg arteriogram when the procedure schedule allows. Wound care and debridement per podiatry. Antibiotics per primary team.     Carotid stenosis  · Asymptomatic.   Routine outpatient surveillance in September 2022 as previously scheduled.     Uncontrolled diabetes mellitus with hyperglycemia  -Hemoglobin A1c 11.8     Chronic renal disease stage III  -Baseline creatinine 1.83  BPH  -Flomax    Anemia of chronic disease  -Stable  Thrombocytopenia nPOA  -unchanged      Coronary artery disease  Hypertension  -Stable on CCB and ACE  Hyperlipidemia  -Lipitor    GERD  -H2 blocker     · Management of comorbid conditions per primary team.     VTE Prophylaxis:  Held for procedure today  SCDs contraindicated in diffuse PAD.       Disposition:  Home with home health

## 2021-12-30 NOTE — PROGRESS NOTES
Reason for Admission:  Osteomyelitis of right foor, HTN, DM                     RUR Score:  10%                   Plan for utilizing home health: Will need HH at discharge, off floor can't obtain options at this time       PCP: First and Last name:  Rohini Giang MD     Name of Practice:    Are you a current patient: Yes/No: yes   Approximate date of last visit: 2 months ago   Can you participate in a virtual visit with your PCP: No                    Current Advanced Directive/Advance Care Plan: Full Code  Pt has per dtr, she will bring in the copy. Need to confirm with pt still his wishes     Healthcare Decision Maker:   Click here to complete [address] including selection of the Healthcare Decision Maker Relationship (ie \"Primary\")                             Transition of Care Plan: Info for assessment provided by dtr Ramiro Larson pt off the floor. Pt is an 79 y/o  male admitted with osteomyelitis, HTN, DM. Pt resides in a one story home with 2-3 steps to enter, sister is currently staying in the home as well. He uses a cane for ambulation, no previous HH or SNF experiences. Prior to admission pt still drives. He will need HH at discharge. Pt off the floor and will need to get [address] after his procedure. Family to transport at discharge. CM will continue to follow and assist with additional discharge needs. Care Management Interventions  PCP Verified by CM:  Yes  Mode of Transport at Discharge: Self (Pt family to transport at discharge )  Transition of Care Consult (CM Consult): Discharge Planning  Support Systems: Other Family Member(s) (Pt resides in one story home with 2-3 steps to the entrance)  Confirm Follow Up Transport: Self (Pt drives but has supportive family to assist if needed)  Discharge Location  Discharge Placement: Home with home health    LILLY Becerra  [phone]

## 2021-12-30 NOTE — PROGRESS NOTES
Occupational Therapy:    Chart reviewed. Patient scheduled for R 4th and 5th toe amputation this date and with known osteomyelitis. Will plan to follow back for OT evaluation post-surgery.     [facility] [place], OTR/L

## 2021-12-30 NOTE — PROGRESS NOTES
End of Shift Note    Bedside shift change report given to Radha Junior (oncoming nurse) by Joaquin Cherry (offgoing nurse). Report included the following information SBAR, Kardex, Procedure Summary, Intake/Output, MAR and Recent Results    Shift worked:  7a-7p     Shift summary and any significant changes:     Pt medicated for mild pain with tylenol. Up with assistance. Received scheduled antibiotics. Tolerating diet. Concerns for physician to address:       Zone phone for oncoming shift:          Activity:  Activity Level: Up with Assistance  Number times ambulated in hallways past shift: 0  Number of times OOB to chair past shift: 0    Cardiac:   Cardiac Monitoring: No      Cardiac Rhythm: Sinus Rhythm    Access:   Current line(s): PIV     Genitourinary:   Urinary status: voiding    Respiratory:   O2 Device: None (Room air)  Chronic home O2 use?: NO       GI:  Last Bowel Movement Date: 12/27/21  Current diet:  ADULT DIET Regular; 3 carb choices (45 gm/meal); No Salt Added (3-4 gm)  DIET NPO  Passing flatus: YES  Tolerating current diet: YES       Pain Management:   Patient states pain is manageable on current regimen: YES    Skin:  Altaf Score: 21  Interventions: increase time out of bed    Patient Safety:  Fall Score: Total Score: 2  Interventions: gripper socks and pt to call before getting OOB       Length of Stay:  Expected LOS: 2d 21h  Actual LOS: [facility]        Primary care provided by Zenobia Purcell and supervised by me. Agree with all documentation and assessments.

## 2021-12-31 LAB
ANION GAP SERPL CALC-SCNC: 3 MMOL/L (ref 5–15)
BUN SERPL-MCNC: 35 MG/DL (ref 6–20)
BUN/CREAT SERPL: 22 (ref 12–20)
CALCIUM SERPL-MCNC: 9.3 MG/DL (ref 8.5–10.1)
CHLORIDE SERPL-SCNC: 111 MMOL/L (ref 97–108)
CO2 SERPL-SCNC: 24 MMOL/L (ref 21–32)
CREAT SERPL-MCNC: 1.57 MG/DL (ref 0.7–1.3)
GLUCOSE BLD STRIP.AUTO-MCNC: 184 MG/DL (ref 65–117)
GLUCOSE BLD STRIP.AUTO-MCNC: 204 MG/DL (ref 65–117)
GLUCOSE BLD STRIP.AUTO-MCNC: 233 MG/DL (ref 65–117)
GLUCOSE SERPL-MCNC: 174 MG/DL (ref 65–100)
POTASSIUM SERPL-SCNC: 4.5 MMOL/L (ref 3.5–5.1)
SERVICE CMNT-IMP: ABNORMAL
SODIUM SERPL-SCNC: 138 MMOL/L (ref 136–145)

## 2021-12-31 PROCEDURE — 74011250637 HC RX REV CODE- 250/637: Performed by: GENERAL ACUTE CARE HOSPITAL

## 2021-12-31 PROCEDURE — 65270000029 HC RM PRIVATE

## 2021-12-31 PROCEDURE — 82962 GLUCOSE BLOOD TEST: CPT

## 2021-12-31 PROCEDURE — 97530 THERAPEUTIC ACTIVITIES: CPT

## 2021-12-31 PROCEDURE — 74011250637 HC RX REV CODE- 250/637: Performed by: STUDENT IN AN ORGANIZED HEALTH CARE EDUCATION/TRAINING PROGRAM

## 2021-12-31 PROCEDURE — 74011636637 HC RX REV CODE- 636/637: Performed by: PODIATRIST

## 2021-12-31 PROCEDURE — 97161 PT EVAL LOW COMPLEX 20 MIN: CPT

## 2021-12-31 PROCEDURE — 97165 OT EVAL LOW COMPLEX 30 MIN: CPT

## 2021-12-31 PROCEDURE — 74011250637 HC RX REV CODE- 250/637: Performed by: PODIATRIST

## 2021-12-31 PROCEDURE — 74011250636 HC RX REV CODE- 250/636: Performed by: PODIATRIST

## 2021-12-31 PROCEDURE — 74011000258 HC RX REV CODE- 258: Performed by: PODIATRIST

## 2021-12-31 PROCEDURE — 97535 SELF CARE MNGMENT TRAINING: CPT

## 2021-12-31 PROCEDURE — 80048 BASIC METABOLIC PNL TOTAL CA: CPT

## 2021-12-31 RX ORDER — OXYCODONE HYDROCHLORIDE 5 MG/1
5-10 TABLET ORAL
Status: DISCONTINUED | OUTPATIENT
Start: 2021-12-31 | End: 2022-01-03

## 2021-12-31 RX ORDER — MORPHINE SULFATE 2 MG/ML
1 INJECTION, SOLUTION INTRAMUSCULAR; INTRAVENOUS
Status: DISCONTINUED | OUTPATIENT
Start: 2021-12-31 | End: 2022-01-03

## 2021-12-31 RX ADMIN — SODIUM CHLORIDE, PRESERVATIVE FREE 10 ML: 5 INJECTION INTRAVENOUS at 22:00

## 2021-12-31 RX ADMIN — OXYCODONE 5 MG: 5 TABLET ORAL at 10:22

## 2021-12-31 RX ADMIN — ACETAMINOPHEN 650 MG: 325 TABLET ORAL at 21:51

## 2021-12-31 RX ADMIN — LISINOPRIL 20 MG: 20 TABLET ORAL at 08:42

## 2021-12-31 RX ADMIN — CEFEPIME 2 G: 2 INJECTION, POWDER, FOR SOLUTION INTRAVENOUS at 17:20

## 2021-12-31 RX ADMIN — Medication 2 UNITS: at 08:42

## 2021-12-31 RX ADMIN — GABAPENTIN 600 MG: 300 CAPSULE ORAL at 17:20

## 2021-12-31 RX ADMIN — GABAPENTIN 600 MG: 300 CAPSULE ORAL at 08:42

## 2021-12-31 RX ADMIN — ATENOLOL 100 MG: 100 TABLET ORAL at 08:42

## 2021-12-31 RX ADMIN — INSULIN GLARGINE 15 UNITS: 100 INJECTION, SOLUTION SUBCUTANEOUS at 21:52

## 2021-12-31 RX ADMIN — OXYCODONE 5 MG: 5 TABLET ORAL at 05:35

## 2021-12-31 RX ADMIN — METRONIDAZOLE 500 MG: 500 INJECTION, SOLUTION INTRAVENOUS at 14:44

## 2021-12-31 RX ADMIN — Medication 3 UNITS: at 17:20

## 2021-12-31 RX ADMIN — Medication 3 UNITS: at 11:17

## 2021-12-31 RX ADMIN — SODIUM CHLORIDE, PRESERVATIVE FREE 10 ML: 5 INJECTION INTRAVENOUS at 06:27

## 2021-12-31 RX ADMIN — ACETAMINOPHEN 650 MG: 325 TABLET ORAL at 10:22

## 2021-12-31 RX ADMIN — TAMSULOSIN HYDROCHLORIDE 0.4 MG: 0.4 CAPSULE ORAL at 21:51

## 2021-12-31 RX ADMIN — FAMOTIDINE 20 MG: 20 TABLET, FILM COATED ORAL at 08:42

## 2021-12-31 RX ADMIN — OXYCODONE 5 MG: 5 TABLET ORAL at 21:01

## 2021-12-31 RX ADMIN — METRONIDAZOLE 500 MG: 500 INJECTION, SOLUTION INTRAVENOUS at 03:46

## 2021-12-31 RX ADMIN — SODIUM CHLORIDE, PRESERVATIVE FREE 5 ML: 5 INJECTION INTRAVENOUS at 14:45

## 2021-12-31 RX ADMIN — CEFEPIME 2 G: 2 INJECTION, POWDER, FOR SOLUTION INTRAVENOUS at 03:45

## 2021-12-31 RX ADMIN — SODIUM CHLORIDE, PRESERVATIVE FREE 5 ML: 5 INJECTION INTRAVENOUS at 14:44

## 2021-12-31 RX ADMIN — HYDROCHLOROTHIAZIDE 25 MG: 25 TABLET ORAL at 21:51

## 2021-12-31 RX ADMIN — CHOLECALCIFEROL TAB 25 MCG (1000 UNIT) 1000 UNITS: 25 TAB at 08:42

## 2021-12-31 RX ADMIN — ATORVASTATIN CALCIUM 10 MG: 10 TABLET, FILM COATED ORAL at 21:51

## 2021-12-31 RX ADMIN — OXYCODONE 5 MG: 5 TABLET ORAL at 01:23

## 2021-12-31 RX ADMIN — ENOXAPARIN SODIUM 40 MG: 100 INJECTION SUBCUTANEOUS at 08:42

## 2021-12-31 NOTE — PROGRESS NOTES
Problem: Mobility Impaired (Adult and Pediatric)  Goal: *Acute Goals and Plan of Care (Insert Text)  Description: FUNCTIONAL STATUS PRIOR TO ADMISSION: Patient was modified independent using a SB quad cane for functional mobility. HOME SUPPORT PRIOR TO ADMISSION: The patient lived with sister-in-law but did not require assist. Bettie Cabot will be moving out in the next month. Physical Therapy Goals  Initiated 12/31/2021  1. Patient will move from supine to sit and sit to supine  in bed with independence within 7 day(s). 2.  Patient will transfer from bed to chair and chair to bed with modified independence using the least restrictive device within 7 day(s). 3.  Patient will perform sit to stand with modified independence within 7 day(s). 4.  Patient will ambulate with modified independence for 50 feet with the least restrictive device within 7 day(s). 5.  Patient will ascend/descend 2 stairs with no handrail(s) with minimal assistance/contact guard assist within 7 day(s). Outcome: Progressing Towards Goal     PHYSICAL THERAPY EVALUATION  Patient: Kat Brady  (80 y.o. male)  Date: 12/31/2021  Primary Diagnosis: Osteomyelitis of right foot (HCC) [M86.9]  Procedure(s) (LRB):  AMPUTATION RIGHT 4TH TOE (Right) 1 Day Post-Op   Precautions: partial weightbearing on R foot with post-op boot (NEEDS BOOT)       ASSESSMENT  Based on the objective data described below, the patient presents with impaired balance, decreased functional activity tolerance, and increased need for assist with functional mobility s/p R 4th toe amputation. Patient received laying supine in bed and agreeable to therapy with c/o pain in R foot. Patient educated on weightbearing precautions at this time. Since he does not have post op boot yet, pt educated on [address] on R LE. Patient verbalized good understanding. Pt achieved supine to sit with supervision.  Patient direction in transfer from sit to stand from EOB with min A to RW. Direction of handplacement on bed initially when transferring. Pt instructed in maintaining NWBing to R LE. Pt directed in placing foot on therapists as tactile cue. Pt instructed in R lateral stepping/scooting in stance with RW with min A towards [facility]. Pt with continual cues for NWBing to R LE. Pt directed in hand placement prior to sitting with pt not responding and sitting in uncontrolled manner. Pt achieved supine with supervision. Pt can continue to benefit from skilled PT prior to discharge when medically appropriate to increase independence with functional mobility. Current Level of Function Impacting Discharge (mobility/balance): min A for STS transfer    Functional Outcome Measure: The patient scored 65/100 on the Modified Barthel Index outcome measure which is indicative of pt is 35% dependent on others for ADLs and functional mobility. Other factors to consider for discharge: pt presenting below baseline function      Patient will benefit from skilled therapy intervention to address the above noted impairments. PLAN :  Recommendations and Planned Interventions: bed mobility training, transfer training, gait training, therapeutic exercises, neuromuscular re-education, patient and family training/education and therapeutic activities      Frequency/Duration: Patient will be followed by physical therapy:  5 times a week to address goals. Recommendation for discharge: (in order for the patient to meet his/her long term goals)  To be determined: rehab vs HH    This discharge recommendation:  Has been made in collaboration with the attending provider and/or case management    IF patient discharges home will need the following DME: to be determined (TBD)         SUBJECTIVE:   Patient stated I can't do too much today because of pain.     OBJECTIVE DATA SUMMARY:   HISTORY:    Past Medical History:   Diagnosis Date    Arthritis     CAD (coronary artery disease)     coronary stent    Diabetes ([facility] [address]     iddm newly on insulin 3/2012    Hypertension     Liver disease      Past Surgical History:   Procedure Laterality Date    HX APPENDECTOMY      HX CHOLECYSTECTOMY      HX ORTHOPAEDIC      Rt. Knee cartlidge removal    HX TONSILLECTOMY      HX UROLOGICAL      Rt Orchiectomy    SC CARDIAC SURG PROCEDURE UNLIST  1997    stents    SC COLON CA SCRN NOT HI RSK IND  10/29/2013         VASCULAR SURGERY PROCEDURE UNLIST      carotid artery endarterectomy       Personal factors and/or comorbidities impacting plan of care: [facility] home    Home Situation  Home Environment: Private residence  # Steps to Enter: 2  Rails to Enter: No  One/Two Story Residence: One story  Living Alone: No  Support Systems:  (sister in law currently staying with him until Jan)  Patient Expects to be Discharged to[de-identified] [employer]  Current DME Used/Available at Home: Elsa Peals, quad,Walker,Raised toilet seat  Tub or Shower Type: Shower    EXAMINATION/PRESENTATION/DECISION MAKING:   Critical Behavior:  Neurologic State: Alert  Orientation Level: Oriented X4  Cognition: Appropriate decision making,Appropriate for age attention/concentration  Safety/Judgement: Awareness of environment  Hearing: Auditory  Auditory Impairment: None  Skin:    Edema:   Range Of Motion:                          Strength:                          Tone & Sensation:                                  Coordination:     Vision:   Acuity: Within Defined Limits  Corrective Lenses: Glasses  Functional Mobility:  Bed Mobility:  Rolling: Supervision  Supine to Sit: Supervision  Sit to Supine: Supervision  Scooting: Supervision  Transfers:  Sit to Stand: Minimum assistance;Assist x1  Stand to Sit: Minimum assistance;Assist x1  Stand Pivot Transfers:  (min A to scoot laterally EOB)                    Balance:   Sitting: Intact  Standing: Impaired  Standing - Static: Good;Constant support  Standing - Dynamic : Constant support;Good  Ambulation/Gait Training: Stairs: Therapeutic Exercises:       Functional Measure:  Barthel Index:    Bathin  Bladder: 10  Bowels: 10  Groomin  Dressin  Feeding: 10  Mobility: 10  Stairs: 0  Toilet Use: 5  Transfer (Bed to Chair and Back): 10  Total: 65/100       The Barthel ADL Index: Guidelines  1. The index should be used as a record of what a patient does, not as a record of what a patient could do. 2. The main aim is to establish degree of independence from any help, physical or verbal, however minor and for whatever reason. 3. The need for supervision renders the patient not independent. 4. A patient's performance should be established using the best available evidence. Asking the patient, friends/relatives and nurses are the usual sources, but direct observation and common sense are also important. However direct testing is not needed. 5. Usually the patient's performance over the preceding 24-48 hours is important, but occasionally longer periods will be relevant. 6. Middle categories imply that the patient supplies over 50 per cent of the effort. 7. Use of aids to be independent is allowed. Score Interpretation (from [address] [place] [address])    Independent   60-79 Minimally independent   40-59 Partially dependent   20-39 Very dependent   <20 Totally dependent     -Genesis Nichols., Barthel, D.W. (1965). Functional evaluation: the Barthel Index. [facility] ([address] (1997). The Barthel activities of daily living index: self-reporting versus actual performance in the old (> or = 75 years). Journal of [address] [place] [address] 45(7), [facility], YnesJNAOMY, Hilary Gaary., [facility]. (). Measuring the change in disability after inpatient rehabilitation; comparison of the responsiveness of the Barthel Index and Functional [place] Measure. Journal of Neurology, Neurosurgery, and Psychiatry, 66(4), 498-925.   Madison Brumfield, MELITA, FRANCISCO Mclean, & Slim Burkett M.A. (2004) Assessment of post-stroke quality of life in cost-effectiveness studies: The usefulness of the Barthel Index and the EuroQoL-5D. Quality of Life Research, 15, 986-53          Physical Therapy Evaluation Charge Determination   History Examination Presentation Decision-Making   LOW Complexity : Zero comorbidities / personal factors that will impact the outcome / POC LOW Complexity : 1-2 Standardized tests and measures addressing body structure, function, activity limitation and / or participation in recreation  LOW Complexity : Stable, uncomplicated  LOW Complexity : FOTO score of       Based on the above components, the patient evaluation is determined to be of the following complexity level: LOW     Pain Ratin/10    Activity Tolerance:   Fair    After treatment patient left in no apparent distress:   Supine in bed and Call bell within reach    COMMUNICATION/EDUCATION:   The patients plan of care was discussed with: Occupational therapist and Registered nurse. Fall prevention education was provided and the patient/caregiver indicated understanding., Patient/family have participated as able in goal setting and plan of care. and Patient/family agree to work toward stated goals and plan of care.     Thank you for this referral.  Brandon Monday, PT   Time Calculation: 23 mins

## 2021-12-31 NOTE — PERIOP NOTES
Irrisept Wound Debridement and Cleansing System  Ref: Myrtle Rosenberg, LOT: [id number] Expiration Date: 10/31/2023

## 2021-12-31 NOTE — PROGRESS NOTES
Transition of Care Plan:    RUR:11%  LOS:  4 days  Disposition:  Home with [facility] anticipated  Follow up appointments:  PCP and Specialists  DME needed:  On-going assessment of DME needs  Transportation at Discharge:  Family will transport home via car  [address] or means to access home:  Dtr has access to home as needed       Medicare Letter: To be provided prior to discharge  Is patient a BCPI-A Bundle:  N/A         If yes, was Bundle Letter given?:    Is patient a  and connected with the [place]? N/A  If yes, was [employer] transfer form completed and [facility] notified? Caregiver Contact:  Audrey Lopez dtr  [phone]  Discharge Caregiver contacted prior to discharge? Met with patient today to further review disposition plans. Patient is hoping to return home at discharge. States he has 2 steps in to the home and hopes to continue with ambulation progress in hospital.      Has a RW at home that was his spouse's () and may need a new one if needed at discharge. Reviewed with patient and bedside RN that boot should be obtained from PT for patient ambulation. Provided patient with Home Health list to select agency of his choice. Patient selected [facility] and referral has been placed. Freedom of Choice form completed and signed by patient and placed on bedside chart. Referral made to [facility] through [address] [place] [address] Rd for Home PT, RN services. CM will be available as needed for additional disposition needs. Care Management Interventions  PCP Verified by CM: Yes  Mode of Transport at Discharge:  Other (see comment) (Family transport home via car)  Transition of Care Consult (CM Consult): [address]: Yes  Physical Therapy Consult: Yes  Occupational Therapy Consult: Yes  Support Systems: Child(masha)  Confirm Follow Up Transport: Family  The Plan for Transition of Care is Related to the Following Treatment Goals : Plans are for return to home with Home Health services. Family supportive and to provide additional assistance as needed.   The Patient and/or Patient Representative was Provided with a Choice of Provider and Agrees with the Discharge Plan?: Yes  Name of the Patient Representative Who was Provided with a Choice of Provider and Agrees with the Discharge Plan: Patient selected provider  Freedom of Choice List was Provided with Basic Dialogue that Supports the Patient's Individualized Plan of Care/Goals, Treatment Preferences and Shares the Quality Data Associated with the Providers?: Yes  Discharge Location  Discharge Placement: Home with home health    [address] [facility] [place], [address], [facility], [facility]-  Complex CM/  [phone]

## 2021-12-31 NOTE — ROUTINE PROCESS
Verbal shift change report given to SPENSER Richard (oncoming nurse) by Apryl TURNER RN (offgoing nurse). Report included the following information SBAR, Kardex and MAR.

## 2021-12-31 NOTE — PERIOP NOTES
TRANSFER - OUT REPORT:    Verbal report given to Hilary PRUETT(name) on [employer]  being transferred to [facility](unit) for routine post - op       Report consisted of patients Situation, Background, Assessment and   Recommendations(SBAR). Information from the following report(s) SBAR, Kardex, OR Summary, Intake/Output, MAR and Recent Results was reviewed with the receiving nurse. Opportunity for questions and clarification was provided.       Patient transported with:   Registered Nurse

## 2021-12-31 NOTE — ANESTHESIA POSTPROCEDURE EVALUATION
Procedure(s):  AMPUTATION RIGHT 4TH TOE. MAC    Anesthesia Post Evaluation      Multimodal analgesia: multimodal analgesia used between 6 hours prior to anesthesia start to PACU discharge  Patient location during evaluation: PACU  Patient participation: complete - patient participated  Level of consciousness: awake and alert  Pain management: adequate  Airway patency: patent  Anesthetic complications: no  Cardiovascular status: acceptable, hemodynamically stable and blood pressure returned to baseline  Respiratory status: acceptable and room air  Hydration status: euvolemic  Post anesthesia nausea and vomiting:  none  Final Post Anesthesia Temperature Assessment:  Normothermia (36.0-37.5 degrees C)      INITIAL Post-op Vital signs:   Vitals Value Taken Time   /61 12/30/21 1900   Temp 36.6 °C (97.8 °F) 12/30/21 1835   Pulse 69 12/30/21 1906   Resp 15 12/30/21 1906   SpO2 96 % 12/30/21 1906   Vitals shown include unvalidated device data.

## 2021-12-31 NOTE — PROGRESS NOTES
Problem: Self Care Deficits Care Plan (Adult)  Goal: *Acute Goals and Plan of Care (Insert Text)  Description: FUNCTIONAL STATUS PRIOR TO ADMISSION: Patient was modified independent using a single point cane for functional mobility. Patient was modified independent for basic and instrumental ADLs. HOME SUPPORT: The patient lived with sister in law who is moving out next month but did not require assist.    Occupational Therapy Goals  Initiated 12/31/2021  1. Patient will perform grooming standing at the sink with modified independence within 7 day(s). 2.  Patient will perform lower body dressing to include gathering of items with modified independence within 7 day(s). 3.  Patient will perform upper body dressing to include gathering of items with modified independence within 7 day(s). 4.  Patient will perform toilet transfers with modified independence within 7 day(s). 5.  Patient will perform all aspects of toileting with modified independence within 7 day(s). 6.  Patient will participate in upper extremity therapeutic exercise/activities with modified independence for 10 minutes within 7 day(s). 7.  Patient will utilize energy conservation techniques during functional activities with verbal cues within 7 day(s). Outcome: Not Met    OCCUPATIONAL THERAPY EVALUATION  Patient: Serafin Gaytan Sr. (80 y.o. male)  Date: 12/31/2021  Primary Diagnosis: Osteomyelitis of right foot (HCC) [M86.9]  Procedure(s) (LRB):  AMPUTATION RIGHT 4TH TOE (Right) 1 Day Post-Op   Precautions: fall, Partial WB with Post Op Shoe       ASSESSMENT  Based on the objective data described below, the patient presents with pain in affected limb, balance and endurance deficits from baseline. He is AAOx4 and overall min A to set/up assistance today for EOB items and sit<>stand with RW. He benefits from moderate cues to maintain a NWB status on RLE today secondary to not having post-op shoe.  At baseline the patient is independent, pending progress and assistance at home (his sister-in-law is moving out soon) he will need HHOT versus SNF placement. Acute care OT will continue to follow. Current Level of Function Impacting Discharge (ADLs/self-care): min a    Functional Outcome Measure: The patient scored Total: 65/100 on the Barthel Index outcome measure which is indicative of being moderately to mildly impaired in basic self-care. Other factors to consider for discharge: level of support at dc, NEEDS POST-OP SHOE to progress mobility      Patient will benefit from skilled therapy intervention to address the above noted impairments. PLAN :  Recommendations and Planned Interventions: self care training, functional mobility training, therapeutic exercise, balance training, therapeutic activities, endurance activities, patient education, home safety training, and family training/education    Frequency/Duration: Patient will be followed by occupational therapy 5 times a week to address goals. Recommendation for discharge: (in order for the patient to meet his/her long term goals)  Occupational therapy at least 2 days/week in the home AND ensure assist and/or supervision for safety with ADLs and IADLs versus SNF    This discharge recommendation:  Has not yet been discussed the attending provider and/or case management    IF patient discharges home will need the following DME: PT has all needed equipment at home       SUBJECTIVE:   Patient stated Rose Leach made a commitment to my wife when she  that I would look take care of her sister. So we take care of each other as needed.     OBJECTIVE DATA SUMMARY:   HISTORY:   Past Medical History:   Diagnosis Date    Arthritis     CAD (coronary artery disease)     coronary stent    Diabetes ([facility] [address]     iddm newly on insulin 3/2012    Hypertension     Liver disease      Past Surgical History:   Procedure Laterality Date    HX APPENDECTOMY      HX CHOLECYSTECTOMY      HX ORTHOPAEDIC      Rt. Knee cartlidge removal    HX TONSILLECTOMY      HX UROLOGICAL      Rt Orchiectomy    [place] CARDIAC SURG PROCEDURE UNLIST  1997    stents    [place] COLON CA SCRN NOT  [address] IND  10/29/2013         VASCULAR SURGERY PROCEDURE UNLIST      carotid artery endarterectomy       Expanded or extensive additional review of patient history:     Home Situation  Home Environment: Private residence  # Steps to Enter: 2  One/Two Story Residence: One story  Living Alone: Yes  Support Systems: Other Family Member(s) (Pt resides in one story home with 2-3 steps to the entrance)  Patient Expects to be Discharged to[de-identified] House  Current DME Used/Available at Home: Cane, straight,Commode, bedside,Raised toilet seat,Shower chair,Walker, rolling (all but cane are his  wifes)    Hand dominance: Right    EXAMINATION OF PERFORMANCE DEFICITS:  Cognitive/Behavioral Status:  Neurologic State: Alert  Orientation Level: Oriented X4  Cognition: Appropriate decision making; Appropriate for age attention/concentration  Perception: Appears intact  Perseveration: No perseveration noted  Safety/Judgement: Awareness of environment    Skin: visible areas intact    Edema: none noted    Hearing: Auditory  Auditory Impairment: None    Vision/Perceptual:                           Acuity: Within Defined Limits    Corrective Lenses: Glasses    Range of Motion:  BUEs WFL                            Strength:  BUEs WFL                   Coordination:     Fine Motor Skills-Upper: Left Intact; Right Intact    Gross Motor Skills-Upper: Left Intact; Right Intact    Tone & Sensation:  BUEs Intact  BLEs: reports diminished sensation in both feet, R>L    Balance:  Sitting: Intact  Standing: Impaired  Standing - Static: Good;Constant support  Standing - Dynamic : Constant support;Good    Functional Mobility and Transfers for ADLs:  Bed Mobility:  Rolling: Supervision  Supine to Sit: Supervision  Sit to Supine: Supervision  Scooting: Supervision    Transfers:  Sit to Stand: Minimum assistance;Assist x1  Stand to Sit: Minimum assistance;Assist x1  Assistive Device : Walker, rolling    ADL Assessment:  Feeding: Setup    Oral Facial Hygiene/Grooming: Setup (bed level)    Bathing: Minimum assistance    Upper Body Dressing: Setup    Lower Body Dressing: Minimum assistance    Toileting: Setup (urinal)                ADL Intervention and task modifications:   Progressed back to bed for set/up assistance for oral care, pt able to reach all items on bed side table  Sitting EOB tailor sitting to don/doff LLE sock    Cognitive Retraining  Safety/Judgement: Awareness of environment    Therapeutic Exercise:  Sit<>Stand: min A with extra CGA for caution, progressing to min A  Functional Mobility: Min A with moderate cues for WB safety. Pt with RW took steps (scoots with LLE) to [facility]. No post-op shoe   Functional Measure:    Barthel Index:  Bathin  Bladder: 10  Bowels: 10  Groomin  Dressin  Feeding: 10  Mobility: 10  Stairs: 0  Toilet Use: 5  Transfer (Bed to Chair and Back): 10  Total: 65/100      The Barthel ADL Index: Guidelines  1. The index should be used as a record of what a patient does, not as a record of what a patient could do. 2. The main aim is to establish degree of independence from any help, physical or verbal, however minor and for whatever reason. 3. The need for supervision renders the patient not independent. 4. A patient's performance should be established using the best available evidence. Asking the patient, friends/relatives and nurses are the usual sources, but direct observation and common sense are also important. However direct testing is not needed. 5. Usually the patient's performance over the preceding 24-48 hours is important, but occasionally longer periods will be relevant. 6. Middle categories imply that the patient supplies over 50 per cent of the effort. 7. Use of aids to be independent is allowed.     Score Interpretation (from [address] [address])  Independent   60-79 Minimally independent   40-59 Partially dependent   20-39 Very dependent   <20 Totally dependent     -Genesis Nichols., Barthel, ZEESHAN. (1965). Functional evaluation: the Barthel Index. [address] ([address] (1997). The Barthel activities of daily living index: self-reporting versus actual performance in the old (> or = 75 years). Journal [address] 45(7), [facility], EDIE, Nathaniel Nino., [facility] . (1999). Measuring the change in disability after inpatient rehabilitation; comparison of the responsiveness of the Barthel Index and Functional [place] Measure. Journal of Neurology, Neurosurgery, and Psychiatry, 66(4), 985-161. Roro Garnica, KEIRY.J.CATHLEEN, FRANCISCO Mclean, & Christa Simpson MGOLDY. (2004) Assessment of post-stroke quality of life in cost-effectiveness studies: The usefulness of the Barthel Index and the EuroQoL-5D. Quality of Life Research, 15, 493-30     Occupational Therapy Evaluation Charge Determination   History Examination Decision-Making   MEDIUM Complexity : Expanded review of history including physical, cognitive and psychosocial  history  LOW Complexity : 1-3 performance deficits relating to physical, cognitive , or psychosocial skils that result in activity limitations and / or participation restrictions  MEDIUM Complexity : Patient may present with comorbidities that affect occupational performnce.  Miniml to moderate modification of tasks or assistance (eg, physical or verbal ) with assesment(s) is necessary to enable patient to complete evaluation       Based on the above components, the patient evaluation is determined to be of the following complexity level: LOW   Pain Ratin/10 in R foot, RN supplied pain medication prior to session, pt better and looking comfortable at rest    Activity Tolerance:   Good    After treatment patient left in no apparent distress:    Supine in bed, Call bell within reach, and Side rails x 3    COMMUNICATION/EDUCATION:   The patients plan of care was discussed with: Physical therapist and Registered nurse. Home safety education was provided and the patient/caregiver indicated understanding., Patient/family have participated as able in goal setting and plan of care. , and Patient/family agree to work toward stated goals and plan of care. This patients plan of care is appropriate for delegation to [facility].     Thank you for this referral.  Tyler Babcock  Time Calculation: 24 mins

## 2021-12-31 NOTE — PROGRESS NOTES
Hospitalist Progress Note    NAME: Leopold Fritz Sr.   :  1940   MRN:  [id number]       Assessment / Plan:    Osteomyelitis of right foot  Start cefepime, vancomycin, and Flagyl IV  Podiatry consulted  Pain management   Blood cultures pending as well as C-reactive protein and sed rate. Sx today at 5:30     Hypertension   PVD,  left MADDISON atherectomy and BAP on 2021   CAD, s/p remote stent  S/p LLE  S/p CEA  Continue home antihypertensive medications, atenolol, hydrochlorothiazide,   Lower lisinopril dose  Hold norvasc given soft BP  STELLA right resting STELLA is mildly decreased. Right TBI is 0.26  Rt LE angio as per Vascular     Diabetes  POC before meals and at bedtime blood glucose monitoring with sliding scale coverage  Holding oral antihypoglycemics for now  Increase Lantus after Sx  hemoglobin A1c 11.8    CKD stage III  Renal function close to baseline  Avoid nephrotoxic meds    Code Status:  Full code  Surrogate Decision Maker:  Enrrique Patrick (daughter) [phone]  DVT Prophylaxis: Lovenox  GI Prophylaxis: Famotidine  Activity at baseline: cane  Lives with: self  Recommended Disposition: Home w/Family  Anticipated Discharge Date:  >48 hours        Subjective:     Discussed with RN events overnight. Afebrile  No CP  No SOB    Review of Systems:  Symptom Y/N Comments  Symptom Y/N Comments   Fever/Chills    Chest Pain     Poor Appetite    Edema     Cough    Abdominal Pain     Sputum    Joint Pain     SOB/LEW    Pruritis/Rash     Nausea/vomit    Tolerating PT/OT     Diarrhea    Tolerating Diet     Constipation    Other       PO intake: No data found.     Wt Readings from Last 10 Encounters:   21 83.3 kg (183 lb 10.3 oz)   04/10/21 88 kg (194 lb 0.1 oz)   19 85.2 kg (187 lb 13.3 oz)   17 91.6 kg (201 lb 15.1 oz)   11/25/15 93.1 kg (205 lb 4 oz)   10/29/13 88 kg (194 lb)   12 90 kg (198 lb 6 oz)   03/22/12 88.5 kg (195 lb)   03/19/12 88.5 kg (195 lb 1 oz)   05/30/10 90.3 kg (199 lb)       Objective:     VITALS:   Last 24hrs VS reviewed since prior progress note. Most recent are:  Patient Vitals for the past 24 hrs:   Temp Pulse Resp BP SpO2   12/30/21 2246 98 °F (36.7 °C) 74 17 (!) 145/75 98 %   12/30/21 1930 98 °F (36.7 °C) 67 10 134/62 96 %   12/30/21 1915  69 16 133/63 96 %   12/30/21 1900  68 15 135/61 94 %   12/30/21 1845  70 14 131/64 95 %   12/30/21 1835 97.8 °F (36.6 °C) 71 15 103/83 95 %   12/30/21 1830  73 19 138/63 96 %   12/30/21 1825  74 17 132/62 97 %   12/30/21 1820  87 13 132/65 97 %   12/30/21 1819 97.8 °F (36.6 °C) 75 15 132/65 97 %   12/30/21 1640 98 °F (36.7 °C) 65 18 (!) 153/68 100 %   12/30/21 1536 97.4 °F (36.3 °C) 65 18 116/65 100 %   12/30/21 1211 97.3 °F (36.3 °C) 64 17 106/72 98 %   12/30/21 0824 97.7 °F (36.5 °C) 64 17 (!) 125/56 96 %       Intake/Output Summary (Last 24 hours) at 12/31/2021 0325  Last data filed at 12/30/2021 1809  Gross per 24 hour   Intake 440 ml   Output    Net 440 ml        I had a face to face encounter, and independently examined this patient on 12/31/2021, as outlined below:    PHYSICAL EXAM:  General:    No distress     HEENT: Atraumatic, anicteric sclerae, pink conjunctivae, MMM  Neck:  Supple, symmetrical  Lungs:   CTA. No Wheezing/Rhonchi. No rales. No tenderness. No Accessory muscle use. Heart:   Regular rhythm. No murmur. No JVD. Non palpable DP pulses. GI/:   Soft. NT. ND. BS normal  Extremities: No edema. No cyanosis. No clubbing. Skin:     Not pale. Not Jaundiced. R 4th toe swelling/redness  Psych:  Good insight. Not depressed. Not anxious or agitated. Neurologic: Alert and oriented X 4. EOMs intact. No facial asymmetry. No slurred speech. Symmetrical strength, Sensation grossly intact. Labs     I reviewed today's most current labs and imaging studies.   Pertinent labs include:  Recent Labs     12/29/21  7650 12/28/21  0334   WBC 6.7 6.1   HGB 11.1* 11.8*   HCT 33.2* 34.4*   * 149*     Recent Labs     12/30/21  0222 12/29/21  0303 12/28/21  0334    139 140   K 4.1 4.4 4.2   * 110* 108   CO2 20* 23 27   * 160* 274*   BUN 45* 47* 44*   CREA 1.84* 1.98* 1.95*   CA 8.9 9.0 9.5   MG  --   --  2.4   ALB  --   --  3.0*   TBILI  --   --  0.8   ALT  --   --  19     XR FOOT RT MIN 3 V    Result Date: 12/27/2021  No acute fracture or dislocation. MRI FOOT RT W WO CONT    Result Date: 12/28/2021  1. Osteomyelitis of the fourth toe proximal, middle and distal phalanges. 2. Query early septic arthritis of fifth toe PIP joint. XR CHEST PORT    Result Date: 12/27/2021  No acute process identified. No results found.        Current Medications:     Current Facility-Administered Medications:     lisinopriL (PRINIVIL, ZESTRIL) tablet 20 mg, 20 mg, Oral, DAILY, Shaan Mcdaniel, DPM, 20 mg at 12/30/21 0855    famotidine (PEPCID) tablet 20 mg, 20 mg, Oral, DAILY, Shaan Mcdaniel, DPM, 20 mg at 12/30/21 0856    sodium chloride (NS) flush 5-40 mL, 5-40 mL, IntraVENous, Q8H, Shaan Mcdaniel, DPM, 10 mL at 12/30/21 2200    oxyCODONE IR (ROXICODONE) tablet 5 mg, 5 mg, Oral, PRN, Marina Barbosa MD, 5 mg at 12/31/21 0123    fentaNYL citrate (PF) injection 25 mcg, 25 mcg, IntraVENous, Multiple, Marina Barbosa MD    ondansetron [facility]) injection 4 mg, 4 mg, IntraVENous, PRN, Marina Barbosa MD    enoxaparin (LOVENOX) injection 40 mg, 40 mg, SubCUTAneous, DAILY, Mahavadi, Shaan, DPM    HYDROcodone-acetaminophen (NORCO) 7.5-325 mg per tablet 1 Tablet, 1 Tablet, Oral, Q4H PRN, Shaan Mcdaniel DPM    bupivacaine (PF) (MARCAINE) 0.5 % (5 mg/mL) injection, , , PRN, Shaan Mcdaniel, SHARYNM, 10 mL at 12/30/21 2002    [Held by provider] amLODIPine (NORVASC) tablet 5 mg, 5 mg, Oral, DAILY, Vinny Parsons, MARKOP, 5 mg at 12/29/21 0905    [Held by provider] aspirin delayed-release tablet 81 mg, 81 mg, Oral, DAILY, Jaqueline Vinny, ACNP, 81 mg at 12/28/21 0942    atenoloL (TENORMIN) tablet 100 mg, 100 mg, Oral, DAILY, Shaan Mcdaniel, DPM, 100 mg at 12/30/21 0855    cholecalciferol (VITAMIN D3) (1000 Units /25 mcg) tablet 1,000 Units, 1,000 Units, Oral, DAILY, Shaan Mcdaniel, DPM, 1,000 Units at 12/30/21 0856    hydroCHLOROthiazide (HYDRODIURIL) tablet 25 mg, 25 mg, Oral, QHS, Karenavaalbaro, Shaan, DPM, 25 mg at 12/30/21 2241    insulin glargine (LANTUS) injection 15 Units, 15 Units, SubCUTAneous, QHS, Jonas, Shaan, DPM, 15 Units at 12/30/21 2243    atorvastatin (LIPITOR) tablet 10 mg, 10 mg, Oral, QHS, Jonas, Shaan, DPM, 10 mg at 12/30/21 2241    tamsulosin (FLOMAX) capsule 0.4 mg, 0.4 mg, Oral, QHS, Jonas, Shaan, DPM, 0.4 mg at 12/30/21 2241    gabapentin (NEURONTIN) capsule 600 mg, 600 mg, Oral, BID, Karenavaalbaro, Shaan, DPM, 600 mg at 12/30/21 0856    sodium chloride (NS) flush 5-40 mL, 5-40 mL, IntraVENous, Q8H, Mary Jo Mcdanielkanth, DPM, 10 mL at 12/30/21 2200    sodium chloride (NS) flush 5-40 mL, 5-40 mL, IntraVENous, PRN, Mary Jo Mcdanielkanth, DPM    acetaminophen (TYLENOL) tablet 650 mg, 650 mg, Oral, Q6H PRN, 650 mg at 12/30/21 0856 **OR** acetaminophen (TYLENOL) suppository 650 mg, 650 mg, Rectal, Q6H PRN, Gil Mcdanielh, DPM    polyethylene glycol (MIRALAX) packet 17 g, 17 g, Oral, DAILY PRN, Shaan Mcdaniel, ASHLEE    ondansetron (ZOFRAN ODT) tablet 4 mg, 4 mg, Oral, Q8H PRN **OR** ondansetron (ZOFRAN) injection 4 mg, 4 mg, IntraVENous, Q6H PRN, Shaan Mcdaniel DPM    insulin lispro (HUMALOG) injection, , SubCUTAneous, AC&HS, Shaan Mcdaniel DPM, 2 Units at 12/30/21 1239    glucose chewable tablet 16 g, 4 Tablet, Oral, PRN, Shaan Mcdaniel, ASHLEE    dextrose (D50W) injection syrg 12.5-25 g, 12.5-25 g, IntraVENous, PRN, Shaan Mcdaniel DPM    glucagon (GLUCAGEN) injection 1 mg, 1 mg, IntraMUSCular, PRN, Alex Espinoza, SHARYNM   metroNIDAZOLE (FLAGYL) IVPB premix 500 mg, 500 mg, IntraVENous, Q12H, Mahavadi, Shaan, DPM, Last Rate: 100 mL/hr at 12/30/21 1424, 500 mg at 12/30/21 1424    cefepime (MAXIPIME) 2 g in 0.9% sodium chloride (MBP/ADV) 100 mL MBP, 2 g, IntraVENous, Q12H, Mahavadi, Shaan, DPM, Last Rate: 200 mL/hr at 12/30/21 1618, 2 g at 12/30/21 1618    vancomycin (VANCOCIN) 1,000 mg in 0.9% sodium chloride 250 mL (VIAL-MATE), 1,000 mg, IntraVENous, Q24H, Mahavadi, Shaan, DPM, Last Rate: 250 mL/hr at 12/30/21 2330, 1,000 mg at 12/30/21 2330     Procedures: see electronic medical records for all procedures/Xrays and details which were not copied into this note but were reviewed prior to creation of Plan. Reviewed most current lab test results and cultures  YES  Reviewed most current radiology test results   YES  Review and summation of old records today    NO  Reviewed patient's current orders and MAR    YES  PMH/ reviewed - no change compared to H&P  ________________________________________________________________________  Care Plan discussed with:    Comments   Patient x    Family      RN     Care Manager x    Consultant                       x Multidiciplinary team rounds were held today with , nursing, pharmacist and clinical coordinator. Patient's plan of care was discussed; medications were reviewed and discharge planning was addressed.      ________________________________________________________________________  Total NON critical care TIME:  34   Minutes    Total CRITICAL CARE TIME Spent:   Minutes non procedure based      Comments   >50% of visit spent in counseling and coordination of care x     This includes time during multidisciplinary rounds if indicated above   ________________________________________________________________________  Jorge Campos MD

## 2021-12-31 NOTE — PROGRESS NOTES
Shift report given to [facility]. 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 Dressing is intact with Ace wrap. Patient is using the urinal at the bedside. Patient tolerating IV antibiotics.

## 2022-01-01 LAB
ANION GAP SERPL CALC-SCNC: 6 MMOL/L (ref 5–15)
BACTERIA SPEC CULT: NORMAL
BUN SERPL-MCNC: 33 MG/DL (ref 6–20)
BUN/CREAT SERPL: 20 (ref 12–20)
CALCIUM SERPL-MCNC: 8.9 MG/DL (ref 8.5–10.1)
CHLORIDE SERPL-SCNC: 109 MMOL/L (ref 97–108)
CO2 SERPL-SCNC: 22 MMOL/L (ref 21–32)
CREAT SERPL-MCNC: 1.69 MG/DL (ref 0.7–1.3)
DATE LAST DOSE: ABNORMAL
GLUCOSE BLD STRIP.AUTO-MCNC: 129 MG/DL (ref 65–117)
GLUCOSE BLD STRIP.AUTO-MCNC: 140 MG/DL (ref 65–117)
GLUCOSE BLD STRIP.AUTO-MCNC: 171 MG/DL (ref 65–117)
GLUCOSE BLD STRIP.AUTO-MCNC: 241 MG/DL (ref 65–117)
GLUCOSE SERPL-MCNC: 131 MG/DL (ref 65–100)
POTASSIUM SERPL-SCNC: 4.4 MMOL/L (ref 3.5–5.1)
REPORTED DOSE,DOSE: ABNORMAL UNITS
REPORTED DOSE/TIME,TMG: 2300
SERVICE CMNT-IMP: ABNORMAL
SERVICE CMNT-IMP: NORMAL
SODIUM SERPL-SCNC: 137 MMOL/L (ref 136–145)
VANCOMYCIN TROUGH SERPL-MCNC: 12.4 UG/ML (ref 5–10)

## 2022-01-01 PROCEDURE — 82962 GLUCOSE BLOOD TEST: CPT

## 2022-01-01 PROCEDURE — 74011250637 HC RX REV CODE- 250/637: Performed by: NURSE PRACTITIONER

## 2022-01-01 PROCEDURE — 74011250637 HC RX REV CODE- 250/637: Performed by: PODIATRIST

## 2022-01-01 PROCEDURE — 74011250637 HC RX REV CODE- 250/637: Performed by: GENERAL ACUTE CARE HOSPITAL

## 2022-01-01 PROCEDURE — 80202 ASSAY OF VANCOMYCIN: CPT

## 2022-01-01 PROCEDURE — 74011250636 HC RX REV CODE- 250/636: Performed by: PODIATRIST

## 2022-01-01 PROCEDURE — 94760 N-INVAS EAR/PLS OXIMETRY 1: CPT

## 2022-01-01 PROCEDURE — 80048 BASIC METABOLIC PNL TOTAL CA: CPT

## 2022-01-01 PROCEDURE — 74011636637 HC RX REV CODE- 636/637: Performed by: PODIATRIST

## 2022-01-01 PROCEDURE — 36415 COLL VENOUS BLD VENIPUNCTURE: CPT

## 2022-01-01 PROCEDURE — 65270000029 HC RM PRIVATE

## 2022-01-01 PROCEDURE — 74011000258 HC RX REV CODE- 258: Performed by: PODIATRIST

## 2022-01-01 RX ADMIN — SODIUM CHLORIDE, PRESERVATIVE FREE 10 ML: 5 INJECTION INTRAVENOUS at 05:42

## 2022-01-01 RX ADMIN — GABAPENTIN 600 MG: 300 CAPSULE ORAL at 17:06

## 2022-01-01 RX ADMIN — CHOLECALCIFEROL TAB 25 MCG (1000 UNIT) 1000 UNITS: 25 TAB at 09:39

## 2022-01-01 RX ADMIN — ACETAMINOPHEN 650 MG: 325 TABLET ORAL at 09:39

## 2022-01-01 RX ADMIN — VANCOMYCIN HYDROCHLORIDE 1000 MG: 1 INJECTION, POWDER, LYOPHILIZED, FOR SOLUTION INTRAVENOUS at 22:40

## 2022-01-01 RX ADMIN — SODIUM CHLORIDE, PRESERVATIVE FREE 10 ML: 5 INJECTION INTRAVENOUS at 22:39

## 2022-01-01 RX ADMIN — Medication 2 UNITS: at 12:11

## 2022-01-01 RX ADMIN — ASPIRIN 81 MG: 81 TABLET, COATED ORAL at 09:40

## 2022-01-01 RX ADMIN — Medication 2 UNITS: at 09:49

## 2022-01-01 RX ADMIN — TAMSULOSIN HYDROCHLORIDE 0.4 MG: 0.4 CAPSULE ORAL at 22:23

## 2022-01-01 RX ADMIN — Medication 2 UNITS: at 22:23

## 2022-01-01 RX ADMIN — METRONIDAZOLE 500 MG: 500 INJECTION, SOLUTION INTRAVENOUS at 13:30

## 2022-01-01 RX ADMIN — SODIUM CHLORIDE, PRESERVATIVE FREE 5 ML: 5 INJECTION INTRAVENOUS at 13:31

## 2022-01-01 RX ADMIN — HYDROCHLOROTHIAZIDE 25 MG: 25 TABLET ORAL at 22:00

## 2022-01-01 RX ADMIN — OXYCODONE 10 MG: 5 TABLET ORAL at 22:49

## 2022-01-01 RX ADMIN — ATORVASTATIN CALCIUM 10 MG: 10 TABLET, FILM COATED ORAL at 22:23

## 2022-01-01 RX ADMIN — METRONIDAZOLE 500 MG: 500 INJECTION, SOLUTION INTRAVENOUS at 03:00

## 2022-01-01 RX ADMIN — FAMOTIDINE 20 MG: 20 TABLET, FILM COATED ORAL at 09:39

## 2022-01-01 RX ADMIN — CEFEPIME 2 G: 2 INJECTION, POWDER, FOR SOLUTION INTRAVENOUS at 17:06

## 2022-01-01 RX ADMIN — GABAPENTIN 600 MG: 300 CAPSULE ORAL at 09:39

## 2022-01-01 RX ADMIN — OXYCODONE 10 MG: 5 TABLET ORAL at 14:24

## 2022-01-01 RX ADMIN — CEFEPIME 2 G: 2 INJECTION, POWDER, FOR SOLUTION INTRAVENOUS at 04:16

## 2022-01-01 RX ADMIN — INSULIN GLARGINE 15 UNITS: 100 INJECTION, SOLUTION SUBCUTANEOUS at 22:23

## 2022-01-01 RX ADMIN — VANCOMYCIN HYDROCHLORIDE 1000 MG: 1 INJECTION, POWDER, LYOPHILIZED, FOR SOLUTION INTRAVENOUS at 00:16

## 2022-01-01 RX ADMIN — ENOXAPARIN SODIUM 40 MG: 100 INJECTION SUBCUTANEOUS at 09:43

## 2022-01-01 RX ADMIN — OXYCODONE 10 MG: 5 TABLET ORAL at 06:57

## 2022-01-01 RX ADMIN — ACETAMINOPHEN 650 MG: 325 TABLET ORAL at 17:06

## 2022-01-01 RX ADMIN — SODIUM CHLORIDE, PRESERVATIVE FREE 10 ML: 5 INJECTION INTRAVENOUS at 05:39

## 2022-01-01 RX ADMIN — LISINOPRIL 20 MG: 20 TABLET ORAL at 09:40

## 2022-01-01 RX ADMIN — ATENOLOL 100 MG: 100 TABLET ORAL at 09:40

## 2022-01-01 NOTE — PROGRESS NOTES
End of Shift Note    Bedside shift change report given to [address] (oncoming nurse) by Erica Johns (offgoing nurse). Report included the following information SBAR, Kardex, Intake/Output, MAR and Recent Results    Shift worked: [id number]     Shift summary and any significant changes:    Patient rested well. Medicated for moderate pain throughout shift. Dressing is intact to surgical site on right foot. No other significant changes. Concerns for physician to address:      Zone phone for oncoming shift:   [phone]       Activity:  Activity Level: Up with Assistance  Number times ambulated in hallways past shift: 0  Number of times OOB to chair past shift: 0    Cardiac:   Cardiac Monitoring: No      Cardiac Rhythm: Sinus Rhythm    Access:   Current line(s): PIV     Genitourinary:   Urinary status: voiding    Respiratory:   O2 Device: None (Room air)  Chronic home O2 use?: N/A  Incentive spirometer at bedside: YES     GI:  Last Bowel Movement Date: 12/30/21  Current diet:  DIET ONE TIME MESSAGE  ADULT DIET Regular; 4 carb choices (60 gm/meal)  Passing flatus: YES  Tolerating current diet: YES       Pain Management:   Patient states pain is manageable on current regimen: YES    Skin:  Altaf Score: 19  Interventions: float heels, increase time out of bed and nutritional support     Patient Safety:  Fall Score:  Total Score: 2  Interventions: assistive device (walker, cane, etc), gripper socks and pt to call before getting OOB       Length of Stay:  Expected LOS: 2d 21h  Actual LOS: [address] LPN

## 2022-01-01 NOTE — PROGRESS NOTES
Hospitalist Progress Note    NAME: Alondra Springer Sr.   :  1940   MRN:  [id number]       Assessment / Plan:    Osteomyelitis of right foot  Start cefepime, vancomycin, and Flagyl IV  Podiatry consulted  Pain management   Blood cultures pending as well as C-reactive protein and sed rate. S/p amputation, had wound cultures after Sx?     Hypertension   PVD,  left MADDISON atherectomy and BAP on 2021   CAD, s/p remote stent  S/p LLE  S/p CEA  Continue home antihypertensive medications, atenolol, hydrochlorothiazide,   Lower lisinopril dose  Hold norvasc given soft BP  STELLA right resting STELLA is mildly decreased. Right TBI is 0.26  Rt LE angio as per Vascular as outpt     Diabetes  POC before meals and at bedtime blood glucose monitoring with sliding scale coverage  Holding oral antihypoglycemics for now  Increase Lantus after Sx  hemoglobin A1c 11.8    CKD stage III  Renal function close to baseline  Avoid nephrotoxic meds    Code Status:  Full code  Surrogate Decision Maker:  Gosia Shay (daughter) [phone]  DVT Prophylaxis: Lovenox  GI Prophylaxis: Famotidine  Activity at baseline: cane  Lives with: self  Recommended Disposition: Home w/Family  Anticipated Discharge Date:  >48 hours        Subjective:     Discussed with RN events overnight. Afebrile  No CP  No SOB    Review of Systems:  Symptom Y/N Comments  Symptom Y/N Comments   Fever/Chills    Chest Pain     Poor Appetite    Edema     Cough    Abdominal Pain     Sputum    Joint Pain     SOB/ELW    Pruritis/Rash     Nausea/vomit    Tolerating PT/OT     Diarrhea    Tolerating Diet     Constipation    Other       PO intake: No data found.     Wt Readings from Last 10 Encounters:   21 83.3 kg (183 lb 10.3 oz)   04/10/21 88 kg (194 lb 0.1 oz)   19 85.2 kg (187 lb 13.3 oz)   17 91.6 kg (201 lb 15.1 oz)   11/25/15 93.1 kg (205 lb 4 oz) 10/29/13 88 kg (194 lb)   03/24/12 90 kg (198 lb 6 oz)   03/22/12 88.5 kg (195 lb)   03/19/12 88.5 kg (195 lb 1 oz)   05/30/10 90.3 kg (199 lb)       Objective:     VITALS:   Last 24hrs VS reviewed since prior progress note. Most recent are:  Patient Vitals for the past 24 hrs:   Temp Pulse Resp BP SpO2   12/31/21 2013 98.6 °F (37 °C) 86 18 (!) 145/66 98 %   12/31/21 1511 97.9 °F (36.6 °C) 69 17 111/60 100 %   12/31/21 1136 97.4 °F (36.3 °C) 74 18 (!) 136/54 98 %   12/31/21 0807 97.4 °F (36.3 °C) 73 17 (!) 142/63 100 %   12/31/21 0330 97.7 °F (36.5 °C) 71 17 (!) 93/50 98 %   12/30/21 2246 98 °F (36.7 °C) 74 17 (!) 145/75 98 %       Intake/Output Summary (Last 24 hours) at 12/31/2021 2209  Last data filed at 12/31/2021 0345  Gross per 24 hour   Intake    Output 400 ml   Net -400 ml        I had a face to face encounter, and independently examined this patient on 12/31/2021, as outlined below:    PHYSICAL EXAM:  General:    No distress     HEENT: Atraumatic, anicteric sclerae, pink conjunctivae, MMM  Neck:  Supple, symmetrical  Lungs:   CTA. No Wheezing/Rhonchi. No rales. No tenderness. No Accessory muscle use. Heart:   Regular rhythm. No murmur. No JVD. Non palpable DP pulses. GI/:   Soft. NT. ND. BS normal  Extremities: No edema. No cyanosis. No clubbing. Skin:     Not pale. Not Jaundiced. R foot covered by dressing   Psych:  Good insight. Not depressed. Not anxious or agitated. Neurologic: Alert and oriented X 4. EOMs intact. No facial asymmetry. No slurred speech. Symmetrical strength, Sensation grossly intact. Labs     I reviewed today's most current labs and imaging studies.   Pertinent labs include:  Recent Labs     12/29/21  0303   WBC 6.7   HGB 11.1*   HCT 33.2*   *     Recent Labs     12/31/21  0351 12/30/21  0222 12/29/21 0303    138 139   K 4.5 4.1 4.4   * 112* 110*   CO2 24 20* 23   * 106* 160*   BUN 35* 45* 47*   CREA 1.57* 1.84* 1.98*   CA 9.3 8.9 9.0     XR FOOT RT MIN 3 V    Result Date: 12/27/2021  No acute fracture or dislocation. MRI FOOT RT W WO CONT    Result Date: 12/28/2021  1. Osteomyelitis of the fourth toe proximal, middle and distal phalanges. 2. Query early septic arthritis of fifth toe PIP joint. XR CHEST PORT    Result Date: 12/27/2021  No acute process identified. No results found. Current Medications:     Current Facility-Administered Medications:     oxyCODONE IR (ROXICODONE) tablet 5-10 mg, 5-10 mg, Oral, Q6H PRN, Otis Song MD, 5 mg at 12/31/21 2101    morphine injection 1 mg, 1 mg, IntraVENous, Q4H PRN, Alicja Song MD    [START ON 1/1/2022] Vancomycin trough on 1/1 prior to the 2300 dose.  thanks, , Other, Kem Street MD    lisinopriL (PRINIVIL, ZESTRIL) tablet 20 mg, 20 mg, Oral, DAILY, Shaan Mcdaniel DPM, 20 mg at 12/31/21 0842    famotidine (PEPCID) tablet 20 mg, 20 mg, Oral, DAILY, Shaan Mcdaniel DPM, 20 mg at 12/31/21 0842    sodium chloride (NS) flush 5-40 mL, 5-40 mL, IntraVENous, Q8H, Shaan Mcdaniel DPM, 5 mL at 12/31/21 1444    [Held by provider] fentaNYL citrate (PF) injection 25 mcg, 25 mcg, IntraVENous, Multiple, Roxanna Hammond MD    ondansetron [facility]) injection 4 mg, 4 mg, IntraVENous, PRN, Roxanna Hammond MD    enoxaparin (LOVENOX) injection 40 mg, 40 mg, SubCUTAneous, DAILY, Shaan Mcdaniel DPM, 40 mg at 12/31/21 0842    [Held by provider] HYDROcodone-acetaminophen ([facility]) 7.5-325 mg per tablet 1 Tablet, 1 Tablet, Oral, Q4H PRN, Shaan Mcdaniel DPM    bupivacaine (PF) (MARCAINE) 0.5 % (5 mg/mL) injection, , , PRN, Shaan Mcdaniel, DPM, 10 mL at 12/30/21 2002    [Held by provider] amLODIPine (NORVASC) tablet 5 mg, 5 mg, Oral, DAILY, Hiroor Atmariama, ACNP, 5 mg at 12/29/21 0905    aspirin delayed-release tablet 81 mg, 81 mg, Oral, DAILY, Jaqueline Atmariama, ACNP, 81 mg at 12/28/21 0942    atenoloL (TENORMIN) tablet 100 mg, 100 mg, Oral, DAILY, Shaan Mcdaniel, DPM, 100 mg at 12/31/21 3444    cholecalciferol (VITAMIN D3) (1000 Units /25 mcg) tablet 1,000 Units, 1,000 Units, Oral, DAILY, Shaan Mcdaniel, DPM, 1,000 Units at 12/31/21 0842    hydroCHLOROthiazide (HYDRODIURIL) tablet 25 mg, 25 mg, Oral, QHS, Jonas, Shaan, DPM, 25 mg at 12/31/21 2151    insulin glargine (LANTUS) injection 15 Units, 15 Units, SubCUTAneous, QHS, Jonas, Shaan, DPM, 15 Units at 12/31/21 2152    atorvastatin (LIPITOR) tablet 10 mg, 10 mg, Oral, QHS, Jonas, Shaan, DPM, 10 mg at 12/31/21 2151    tamsulosin (FLOMAX) capsule 0.4 mg, 0.4 mg, Oral, QHS, Shaan Mcdaniel, DPM, 0.4 mg at 12/31/21 2151    gabapentin (NEURONTIN) capsule 600 mg, 600 mg, Oral, BID, Shaan Mcdaniel, DPM, 600 mg at 12/31/21 1720    sodium chloride (NS) flush 5-40 mL, 5-40 mL, IntraVENous, Q8H, Shaan Mcdaniel, DPM, 5 mL at 12/31/21 1445    sodium chloride (NS) flush 5-40 mL, 5-40 mL, IntraVENous, PRN, Shaan Mcdaniel, ASHLEE    acetaminophen (TYLENOL) tablet 650 mg, 650 mg, Oral, Q6H PRN, 650 mg at 12/31/21 2151 **OR** acetaminophen (TYLENOL) suppository 650 mg, 650 mg, Rectal, Q6H PRN, Shaan Mcdaniel DPM    polyethylene glycol (MIRALAX) packet 17 g, 17 g, Oral, DAILY PRN, Shaan Mcdaniel DPM    ondansetron (ZOFRAN ODT) tablet 4 mg, 4 mg, Oral, Q8H PRN **OR** ondansetron (ZOFRAN) injection 4 mg, 4 mg, IntraVENous, Q6H PRN, Shaan Mcdaniel, DPM    insulin lispro (HUMALOG) injection, , SubCUTAneous, AC&HS, Shaan Mcdaniel, DPASIYA, 3 Units at 12/31/21 1720    glucose chewable tablet 16 g, 4 Tablet, Oral, PRN, Shaan Mcdaniel, DPM    dextrose (D50W) injection syrg 12.5-25 g, 12.5-25 g, IntraVENous, PRN, Shaan Mcdaniel, DPM    glucagon (GLUCAGEN) injection 1 mg, 1 mg, IntraMUSCular, PRN, Shaan Mcdaniel, DPM    metroNIDAZOLE (FLAGYL) IVPB premix 500 mg, 500 mg, IntraVENous, Q12H, Shaan Mcdaniel, DPASIYA, Last Rate: 100 mL/hr at 12/31/21 1444, 500 mg at 12/31/21 1444    cefepime (MAXIPIME) 2 g in 0.9% sodium chloride (MBP/ADV) 100 mL MBP, 2 g, IntraVENous, Q12H, KarenavaShaan irvin, DPM, Last Rate: 200 mL/hr at 12/31/21 1720, 2 g at 12/31/21 1720    vancomycin (VANCOCIN) 1,000 mg in 0.9% sodium chloride 250 mL (VIAL-MATE), 1,000 mg, IntraVENous, Q24H, MahavadiMary JoShaan, DPM, Last Rate: 250 mL/hr at 12/30/21 2330, 1,000 mg at 12/30/21 2330     Procedures: see electronic medical records for all procedures/Xrays and details which were not copied into this note but were reviewed prior to creation of Plan. Reviewed most current lab test results and cultures  YES  Reviewed most current radiology test results   YES  Review and summation of old records today    NO  Reviewed patient's current orders and MAR    YES  PMH/ reviewed - no change compared to H&P  ________________________________________________________________________  Care Plan discussed with:    Comments   Patient x    Family      RN     Care Manager     Consultant                        Multidiciplinary team rounds were held today with , nursing, pharmacist and clinical coordinator. Patient's plan of care was discussed; medications were reviewed and discharge planning was addressed.      ________________________________________________________________________  Total NON critical care TIME:  30   Minutes    Total CRITICAL CARE TIME Spent:   Minutes non procedure based      Comments   >50% of visit spent in counseling and coordination of care x     This includes time during multidisciplinary rounds if indicated above   ________________________________________________________________________  Burnard Goltz, MD

## 2022-01-01 NOTE — PROGRESS NOTES
Vascular    Seen today  Case d/w Dr Amaury Dickinson Thursday  Stable for discharge from vascular standpoint  I will arrange outpatient follow up

## 2022-01-01 NOTE — PROGRESS NOTES
Hospitalist Progress Note    NAME: Tali Scott .   :  1940   MRN:  [id number]       Assessment / Plan:    Osteomyelitis of right foot  Start cefepime, vancomycin, and Flagyl IV  Podiatry consulted  Pain management   Blood cultures pending as well as C-reactive protein and sed rate. S/p amputation, had wound cultures after Sx? Needs ortho boot     Hypertension   PVD,  left MADDISON atherectomy and BAP on 2021   CAD, s/p remote stent  S/p LLE  S/p CEA  Continue home antihypertensive medications, atenolol, hydrochlorothiazide,   Lower lisinopril dose  Hold norvasc given soft BP  STELLA right resting STELLA is mildly decreased. Right TBI is 0.26  Rt LE angio as per Vascular as outpt     Diabetes  POC before meals and at bedtime blood glucose monitoring with sliding scale coverage  Holding oral antihypoglycemics for now  Increase Lantus after Sx  hemoglobin A1c 11.8    CKD stage III  Renal function close to baseline  Avoid nephrotoxic meds    Code Status:  Full code  Surrogate Decision Maker:  Dave Yee (daughter) [phone]  DVT Prophylaxis: Lovenox  GI Prophylaxis: Famotidine  Activity at baseline: cane  Lives with: self  Recommended Disposition: Home w/Family  Anticipated Discharge Date:  >48 hours        Subjective:     Discussed with RN events overnight. Afebrile  No CP  No SOB    Review of Systems:  Symptom Y/N Comments  Symptom Y/N Comments   Fever/Chills    Chest Pain     Poor Appetite    Edema     Cough    Abdominal Pain     Sputum    Joint Pain     SOB/LEW    Pruritis/Rash     Nausea/vomit    Tolerating PT/OT     Diarrhea    Tolerating Diet     Constipation    Other       PO intake: No data found.     Wt Readings from Last 10 Encounters:   21 83.3 kg (183 lb 10.3 oz)   04/10/21 88 kg (194 lb 0.1 oz)   19 85.2 kg (187 lb 13.3 oz)   17 91.6 kg (201 lb 15.1 oz)   11/25/15 93.1 kg (205 lb 4 oz)   10/29/13 88 kg (194 lb)   03/24/12 90 kg (198 lb 6 oz)   03/22/12 88.5 kg (195 lb)   03/19/12 88.5 kg (195 lb 1 oz)   05/30/10 90.3 kg (199 lb)       Objective:     VITALS:   Last 24hrs VS reviewed since prior progress note. Most recent are:  Patient Vitals for the past 24 hrs:   Temp Pulse Resp BP SpO2   01/01/22 0818 97.8 °F (36.6 °C) 71 20 138/73 97 %   01/01/22 0258 97.9 °F (36.6 °C) 71 20 104/60 97 %   12/31/21 2013 98.6 °F (37 °C) 86 18 (!) 145/66 98 %   12/31/21 1511 97.9 °F (36.6 °C) 69 17 111/60 100 %   12/31/21 1136 97.4 °F (36.3 °C) 74 18 (!) 136/54 98 %       Intake/Output Summary (Last 24 hours) at 1/1/2022 1053  Last data filed at 1/1/2022 0300  Gross per 24 hour   Intake    Output 350 ml   Net -350 ml        I had a face to face encounter, and independently examined this patient on 1/1/2022, as outlined below:    PHYSICAL EXAM:  General:    No distress     HEENT: Atraumatic, anicteric sclerae, pink conjunctivae, MMM  Neck:  Supple, symmetrical  Lungs:   CTA. No Wheezing/Rhonchi. No rales. No tenderness. No Accessory muscle use. Heart:   Regular rhythm. No murmur. No JVD. Non palpable DP pulses. GI/:   Soft. NT. ND. BS normal  Extremities: No edema. No cyanosis. No clubbing. Skin:     Not pale. Not Jaundiced. R foot covered by dressing   Psych:  Good insight. Not depressed. Not anxious or agitated. Neurologic: Alert and oriented X 4. EOMs intact. No facial asymmetry. No slurred speech. Symmetrical strength, Sensation grossly intact. Labs     I reviewed today's most current labs and imaging studies. Pertinent labs include:  No results for input(s): WBC, HGB, HCT, PLT, HGBEXT, HCTEXT, PLTEXT, HGBEXT, HCTEXT, PLTEXT in the last 72 hours.   Recent Labs     01/01/22  0308 12/31/21  0351 12/30/21  0222    138 138   K 4.4 4.5 4.1   * 111* 112*   CO2 22 24 20*   * 174* 106*   BUN 33* 35* 45*   CREA 1.69* 1.57* 1.84*   CA 8.9 9.3 8.9     XR FOOT RT MIN 3 V    Result Date: 12/27/2021  No acute fracture or dislocation. MRI FOOT RT W WO CONT    Result Date: 12/28/2021  1. Osteomyelitis of the fourth toe proximal, middle and distal phalanges. 2. Query early septic arthritis of fifth toe PIP joint. XR CHEST PORT    Result Date: 12/27/2021  No acute process identified. No results found. Current Medications:     Current Facility-Administered Medications:     oxyCODONE IR (ROXICODONE) tablet 5-10 mg, 5-10 mg, Oral, Q6H PRN, Krzysztof Song MD, 10 mg at 01/01/22 0657    morphine injection 1 mg, 1 mg, IntraVENous, Q4H PRN, Krzysztof Song MD    Vancomycin trough on 1/1 prior to the 2300 dose.  thanks, , Other, Jodie Venegas MD    lisinopriL (PRINIVIL, ZESTRIL) tablet 20 mg, 20 mg, Oral, DAILY, Shaan Mcdaniel DPM, 20 mg at 01/01/22 0940    famotidine (PEPCID) tablet 20 mg, 20 mg, Oral, DAILY, Shaan Mcdaniel, DPM, 20 mg at 01/01/22 0939    sodium chloride (NS) flush 5-40 mL, 5-40 mL, IntraVENous, Q8H, hSaan Mcdaniel, DPM, 10 mL at 01/01/22 0542    [Held by provider] fentaNYL citrate (PF) injection 25 mcg, 25 mcg, IntraVENous, Multiple, Prasad Negrete MD    ondansetron [facility]) injection 4 mg, 4 mg, IntraVENous, PRN, Prasad Negrete MD    enoxaparin (LOVENOX) injection 40 mg, 40 mg, SubCUTAneous, DAILY, Shaan Mcdaniel DPASIYA, 40 mg at 01/01/22 0943    [Held by provider] HYDROcodone-acetaminophen ([address]) 7.5-325 mg per tablet 1 Tablet, 1 Tablet, Oral, Q4H PRN, Shaan Mcdaniel, DPM    bupivacaine (PF) (MARCAINE) 0.5 % (5 mg/mL) injection, , , PRN, Shaan Mcdaniel DPM, 10 mL at 12/30/21 2002    [Held by provider] amLODIPine (NORVASC) tablet 5 mg, 5 mg, Oral, DAILY, Vinny Parsons, ACNP, 5 mg at 12/29/21 0905    aspirin delayed-release tablet 81 mg, 81 mg, Oral, DAILY, Vinny Parsons, ACNP, 81 mg at 01/01/22 0940    atenoloL (TENORMIN) tablet 100 mg, 100 mg, Oral, DAILY, Shaan Mcdaniel, SHARYNM, 100 mg at 01/01/22 0940    cholecalciferol (VITAMIN D3) (1000 Units /25 mcg) tablet 1,000 Units, 1,000 Units, Oral, DAILY, Shaan Mcdaniel, DPM, 1,000 Units at 01/01/22 0939    hydroCHLOROthiazide (HYDRODIURIL) tablet 25 mg, 25 mg, Oral, QHS, Jonas, Shaan, DPM, 25 mg at 12/31/21 2151    insulin glargine (LANTUS) injection 15 Units, 15 Units, SubCUTAneous, QHS, Jonas, Shaan, DPM, 15 Units at 12/31/21 2152    atorvastatin (LIPITOR) tablet 10 mg, 10 mg, Oral, QHS, Jonas, Shaan, DPM, 10 mg at 12/31/21 2151    tamsulosin (FLOMAX) capsule 0.4 mg, 0.4 mg, Oral, QHS, Jonas, Shaan, DPM, 0.4 mg at 12/31/21 2151    gabapentin (NEURONTIN) capsule 600 mg, 600 mg, Oral, BID, Shaan Mcdaniel, DPM, 600 mg at 01/01/22 0939    sodium chloride (NS) flush 5-40 mL, 5-40 mL, IntraVENous, Q8H, Shaan Mcdaniel, DPM, 10 mL at 01/01/22 0539    sodium chloride (NS) flush 5-40 mL, 5-40 mL, IntraVENous, PRN, Shaan Mcdaniel, DPASIYA    acetaminophen (TYLENOL) tablet 650 mg, 650 mg, Oral, Q6H PRN, 650 mg at 01/01/22 0939 **OR** acetaminophen (TYLENOL) suppository 650 mg, 650 mg, Rectal, Q6H PRN, Shaan Mcdaniel, SHARYNM    polyethylene glycol (MIRALAX) packet 17 g, 17 g, Oral, DAILY PRN, Shaan Mcdaniel, DPM    ondansetron (ZOFRAN ODT) tablet 4 mg, 4 mg, Oral, Q8H PRN **OR** ondansetron (ZOFRAN) injection 4 mg, 4 mg, IntraVENous, Q6H PRN, Shaan Mcdaniel DPM    insulin lispro (HUMALOG) injection, , SubCUTAneous, AC&HS, Shaan Mcdaniel DPM, 2 Units at 01/01/22 0949    glucose chewable tablet 16 g, 4 Tablet, Oral, PRN, Shaan Mcdaniel DPM    dextrose (D50W) injection syrg 12.5-25 g, 12.5-25 g, IntraVENous, PRN, Shaan Mcdaniel DPM    glucagon (GLUCAGEN) injection 1 mg, 1 mg, IntraMUSCular, PRN, Shaan Mcdaniel DPM    metroNIDAZOLE (FLAGYL) IVPB premix 500 mg, 500 mg, IntraVENous, Q12H, Shaan Mcdaniel DPM, Last Rate: 100 mL/hr at 01/01/22 0300, 500 mg at 01/01/22 0300   cefepime (MAXIPIME) 2 g in 0.9% sodium chloride (MBP/ADV) 100 mL MBP, 2 g, IntraVENous, Q12H, Shaan Mcdaniel, DPM, Last Rate: 200 mL/hr at 01/01/22 0416, 2 g at 01/01/22 0416    vancomycin (VANCOCIN) 1,000 mg in 0.9% sodium chloride 250 mL (VIAL-MATE), 1,000 mg, IntraVENous, Q24H, Shaan Mcdaniel, DPM, Last Rate: 250 mL/hr at 01/01/22 0016, 1,000 mg at 01/01/22 9918     Procedures: see electronic medical records for all procedures/Xrays and details which were not copied into this note but were reviewed prior to creation of Plan. Reviewed most current lab test results and cultures  YES  Reviewed most current radiology test results   YES  Review and summation of old records today    NO  Reviewed patient's current orders and MAR    YES  PMH/ reviewed - no change compared to H&P  ________________________________________________________________________  Care Plan discussed with:    Comments   Patient x    Family      RN     Care Manager     Consultant                        Multidiciplinary team rounds were held today with , nursing, pharmacist and clinical coordinator. Patient's plan of care was discussed; medications were reviewed and discharge planning was addressed.      ________________________________________________________________________  Total NON critical care TIME:  30   Minutes    Total CRITICAL CARE TIME Spent:   Minutes non procedure based      Comments   >50% of visit spent in counseling and coordination of care x     This includes time during multidisciplinary rounds if indicated above   ________________________________________________________________________  Kailash Freed MD

## 2022-01-02 LAB
BACTERIA SPEC CULT: ABNORMAL
BACTERIA SPEC CULT: NORMAL
GLUCOSE BLD STRIP.AUTO-MCNC: 121 MG/DL (ref 65–117)
GLUCOSE BLD STRIP.AUTO-MCNC: 140 MG/DL (ref 65–117)
GLUCOSE BLD STRIP.AUTO-MCNC: 161 MG/DL (ref 65–117)
GLUCOSE BLD STRIP.AUTO-MCNC: 189 MG/DL (ref 65–117)
GRAM STN SPEC: ABNORMAL
SERVICE CMNT-IMP: ABNORMAL
SERVICE CMNT-IMP: NORMAL

## 2022-01-02 PROCEDURE — 77010033678 HC OXYGEN DAILY

## 2022-01-02 PROCEDURE — 74011250637 HC RX REV CODE- 250/637: Performed by: NURSE PRACTITIONER

## 2022-01-02 PROCEDURE — 65270000029 HC RM PRIVATE

## 2022-01-02 PROCEDURE — 74011000258 HC RX REV CODE- 258: Performed by: PODIATRIST

## 2022-01-02 PROCEDURE — 74011250637 HC RX REV CODE- 250/637: Performed by: PODIATRIST

## 2022-01-02 PROCEDURE — 74011636637 HC RX REV CODE- 636/637: Performed by: PODIATRIST

## 2022-01-02 PROCEDURE — 74011000250 HC RX REV CODE- 250: Performed by: PODIATRIST

## 2022-01-02 PROCEDURE — 82962 GLUCOSE BLOOD TEST: CPT

## 2022-01-02 PROCEDURE — 74011250637 HC RX REV CODE- 250/637: Performed by: GENERAL ACUTE CARE HOSPITAL

## 2022-01-02 PROCEDURE — 94760 N-INVAS EAR/PLS OXIMETRY 1: CPT

## 2022-01-02 PROCEDURE — 74011250636 HC RX REV CODE- 250/636: Performed by: GENERAL ACUTE CARE HOSPITAL

## 2022-01-02 PROCEDURE — 74011250636 HC RX REV CODE- 250/636: Performed by: PODIATRIST

## 2022-01-02 RX ORDER — VANCOMYCIN HYDROCHLORIDE
1250 EVERY 24 HOURS
Status: DISCONTINUED | OUTPATIENT
Start: 2022-01-02 | End: 2022-01-04

## 2022-01-02 RX ADMIN — OXYCODONE 10 MG: 5 TABLET ORAL at 16:51

## 2022-01-02 RX ADMIN — SODIUM CHLORIDE, PRESERVATIVE FREE 10 ML: 5 INJECTION INTRAVENOUS at 05:58

## 2022-01-02 RX ADMIN — ACETAMINOPHEN 650 MG: 325 TABLET ORAL at 16:50

## 2022-01-02 RX ADMIN — CEFEPIME 2 G: 2 INJECTION, POWDER, FOR SOLUTION INTRAVENOUS at 15:42

## 2022-01-02 RX ADMIN — VANCOMYCIN HYDROCHLORIDE 1250 MG: 10 INJECTION, POWDER, LYOPHILIZED, FOR SOLUTION INTRAVENOUS at 22:37

## 2022-01-02 RX ADMIN — CHOLECALCIFEROL TAB 25 MCG (1000 UNIT) 1000 UNITS: 25 TAB at 08:30

## 2022-01-02 RX ADMIN — GABAPENTIN 600 MG: 300 CAPSULE ORAL at 15:43

## 2022-01-02 RX ADMIN — ATORVASTATIN CALCIUM 10 MG: 10 TABLET, FILM COATED ORAL at 22:27

## 2022-01-02 RX ADMIN — FAMOTIDINE 20 MG: 20 TABLET, FILM COATED ORAL at 08:30

## 2022-01-02 RX ADMIN — CEFEPIME 2 G: 2 INJECTION, POWDER, FOR SOLUTION INTRAVENOUS at 05:11

## 2022-01-02 RX ADMIN — ACETAMINOPHEN 650 MG: 325 TABLET ORAL at 10:30

## 2022-01-02 RX ADMIN — LISINOPRIL 20 MG: 20 TABLET ORAL at 08:30

## 2022-01-02 RX ADMIN — ATENOLOL 100 MG: 100 TABLET ORAL at 08:30

## 2022-01-02 RX ADMIN — ENOXAPARIN SODIUM 40 MG: 100 INJECTION SUBCUTANEOUS at 08:29

## 2022-01-02 RX ADMIN — TAMSULOSIN HYDROCHLORIDE 0.4 MG: 0.4 CAPSULE ORAL at 22:27

## 2022-01-02 RX ADMIN — INSULIN GLARGINE 15 UNITS: 100 INJECTION, SOLUTION SUBCUTANEOUS at 22:26

## 2022-01-02 RX ADMIN — ASPIRIN 81 MG: 81 TABLET, COATED ORAL at 08:30

## 2022-01-02 RX ADMIN — GABAPENTIN 600 MG: 300 CAPSULE ORAL at 08:29

## 2022-01-02 RX ADMIN — HYDROCHLOROTHIAZIDE 25 MG: 25 TABLET ORAL at 22:27

## 2022-01-02 RX ADMIN — METRONIDAZOLE 500 MG: 500 INJECTION, SOLUTION INTRAVENOUS at 02:10

## 2022-01-02 RX ADMIN — OXYCODONE 10 MG: 5 TABLET ORAL at 10:30

## 2022-01-02 RX ADMIN — Medication 2 UNITS: at 12:07

## 2022-01-02 RX ADMIN — METRONIDAZOLE 500 MG: 500 INJECTION, SOLUTION INTRAVENOUS at 15:42

## 2022-01-02 RX ADMIN — Medication 2 UNITS: at 08:29

## 2022-01-02 RX ADMIN — SODIUM CHLORIDE, PRESERVATIVE FREE 10 ML: 5 INJECTION INTRAVENOUS at 22:00

## 2022-01-02 RX ADMIN — SODIUM CHLORIDE, PRESERVATIVE FREE 5 ML: 5 INJECTION INTRAVENOUS at 15:42

## 2022-01-02 NOTE — PROGRESS NOTES
Hospitalist Progress Note    NAME: Dollie Gaucher Sr.   :  1940   MRN:  [id number]       Assessment / Plan:    Osteomyelitis of right foot  Start cefepime, vancomycin, and Flagyl IV  Podiatry consulted  Pain management   Blood cultures neg so far  S/p amputation, had wound cultures after Sx? Cx + for Staph/yeast  Needs ortho boot     Hypertension   PVD,  left MADDISON atherectomy and BAP on 2021   CAD, s/p remote stent  S/p LLE  S/p CEA  Continue home antihypertensive medications, atenolol, hydrochlorothiazide,   Lower lisinopril dose  Hold norvasc given soft BP  STELLA right resting STELLA is mildly decreased. Right TBI is 0.26  Rt LE angio as per Vascular as outpt     Diabetes  POC before meals and at bedtime blood glucose monitoring with sliding scale coverage  Holding oral antihypoglycemics for now  Increase Lantus after Sx  hemoglobin A1c 11.8    CKD stage III  Renal function close to baseline  Avoid nephrotoxic meds    Code Status:  Full code  Surrogate Decision Maker:  Bran Qureshi (daughter) [phone]  DVT Prophylaxis: Lovenox  GI Prophylaxis: Famotidine  Activity at baseline: cane  Lives with: self  Recommended Disposition: Home w/Family  Anticipated Discharge Date:  >48 hours        Subjective:     Discussed with RN events overnight. Afebrile  No CP  No SOB    Review of Systems:  Symptom Y/N Comments  Symptom Y/N Comments   Fever/Chills    Chest Pain     Poor Appetite    Edema     Cough    Abdominal Pain     Sputum    Joint Pain     SOB/LEW    Pruritis/Rash     Nausea/vomit    Tolerating PT/OT     Diarrhea    Tolerating Diet     Constipation    Other       PO intake: No data found.     Wt Readings from Last 10 Encounters:   21 83.3 kg (183 lb 10.3 oz)   04/10/21 88 kg (194 lb 0.1 oz)   19 85.2 kg (187 lb 13.3 oz)   17 91.6 kg (201 lb 15.1 oz)   11/25/15 93.1 kg (205 lb 4 oz) 10/29/13 88 kg (194 lb)   03/24/12 90 kg (198 lb 6 oz)   03/22/12 88.5 kg (195 lb)   03/19/12 88.5 kg (195 lb 1 oz)   05/30/10 90.3 kg (199 lb)       Objective:     VITALS:   Last 24hrs VS reviewed since prior progress note. Most recent are:  Patient Vitals for the past 24 hrs:   Temp Pulse Resp BP SpO2   01/02/22 0807 98.2 °F (36.8 °C) 74 18 (!) 143/72 97 %   01/02/22 0746 98.7 °F (37.1 °C) 71 18 132/61 94 %   01/02/22 0339 98.4 °F (36.9 °C) 73 18 126/67 98 %   01/01/22 2226  71 18 114/64 96 %   01/01/22 2200  72  114/64    01/01/22 2029 97.5 °F (36.4 °C) 72 20 (!) 106/55 95 %   01/01/22 1523 97.7 °F (36.5 °C) 65 20 (!) 110/53 95 %   01/01/22 1135 97.3 °F (36.3 °C) 63 20 (!) 97/46 94 %       Intake/Output Summary (Last 24 hours) at 1/2/2022 1022  Last data filed at 1/1/2022 2032  Gross per 24 hour   Intake    Output 675 ml   Net -675 ml        I had a face to face encounter, and independently examined this patient on 1/2/2022, as outlined below:    PHYSICAL EXAM:  General:    No distress     HEENT: Atraumatic, anicteric sclerae, pink conjunctivae, MMM  Neck:  Supple, symmetrical  Lungs:   CTA. No Wheezing/Rhonchi. No rales. No tenderness. No Accessory muscle use. Heart:   Regular rhythm. No murmur. No JVD. Non palpable DP pulses. GI/:   Soft. NT. ND. BS normal  Extremities: No edema. No cyanosis. No clubbing. Skin:     Not pale. Not Jaundiced. R foot covered by dressing   Psych:  Good insight. Not depressed. Not anxious or agitated. Neurologic: Alert and oriented X 4. EOMs intact. No facial asymmetry. No slurred speech. Symmetrical strength, Sensation grossly intact. Labs     I reviewed today's most current labs and imaging studies. Pertinent labs include:  No results for input(s): WBC, HGB, HCT, PLT, HGBEXT, HCTEXT, PLTEXT, HGBEXT, HCTEXT, PLTEXT in the last 72 hours.   Recent Labs     01/01/22  0308 12/31/21  0351    138   K 4.4 4.5   * 111*   CO2 22 24   * 174*   BUN 33* 35*   CREA 1.69* 1.57*   CA 8.9 9.3     XR FOOT RT MIN 3 V    Result Date: 12/27/2021  No acute fracture or dislocation. MRI FOOT RT W WO CONT    Result Date: 12/28/2021  1. Osteomyelitis of the fourth toe proximal, middle and distal phalanges. 2. Query early septic arthritis of fifth toe PIP joint. XR CHEST PORT    Result Date: 12/27/2021  No acute process identified. No results found.        Current Medications:     Current Facility-Administered Medications:     oxyCODONE IR (ROXICODONE) tablet 5-10 mg, 5-10 mg, Oral, Q6H PRN, Yung Song MD, 10 mg at 01/01/22 2249    morphine injection 1 mg, 1 mg, IntraVENous, Q4H PRN, Yung Song MD    lisinopriL (PRINIVIL, ZESTRIL) tablet 20 mg, 20 mg, Oral, DAILY, Shaan Mcdaniel DPM, 20 mg at 01/02/22 0830    famotidine (PEPCID) tablet 20 mg, 20 mg, Oral, DAILY, Shaan Mcdaniel DPASIYA, 20 mg at 01/02/22 0830    sodium chloride (NS) flush 5-40 mL, 5-40 mL, IntraVENous, Q8H, Shaan Mcdaniel DPM, 10 mL at 01/01/22 0542    [Held by provider] fentaNYL citrate (PF) injection 25 mcg, 25 mcg, IntraVENous, Multiple, Lou Zhou MD    ondansetron [facility]) injection 4 mg, 4 mg, IntraVENous, PRN, Lou Zhou MD    enoxaparin (LOVENOX) injection 40 mg, 40 mg, SubCUTAneous, DAILY, Shaan Mcdaniel DPM, 40 mg at 01/02/22 0829    [Held by provider] HYDROcodone-acetaminophen ([facility]) 7.5-325 mg per tablet 1 Tablet, 1 Tablet, Oral, Q4H PRN, Shaan Mcdaniel DPM    bupivacaine (PF) (MARCAINE) 0.5 % (5 mg/mL) injection, , , PRN, Shaan Mcdaniel DPM, 10 mL at 12/30/21 2002    [Held by provider] amLODIPine (NORVASC) tablet 5 mg, 5 mg, Oral, DAILY, Vinny Parsons, ACNP, 5 mg at 12/29/21 0905    aspirin delayed-release tablet 81 mg, 81 mg, Oral, DAILY, Vinny Parsons ACNP, 81 mg at 01/02/22 0830    atenoloL (TENORMIN) tablet 100 mg, 100 mg, Oral, DAILY, Shaan Mcdaniel DPM, 100 mg at 01/02/22 0830    cholecalciferol (VITAMIN D3) (1000 Units /25 mcg) tablet 1,000 Units, 1,000 Units, Oral, DAILY, Shaan Mcdaniel, DPM, 1,000 Units at 01/02/22 0830    hydroCHLOROthiazide (HYDRODIURIL) tablet 25 mg, 25 mg, Oral, QHS, Karenavadi, Shaan, DPM, 25 mg at 01/01/22 2200    insulin glargine (LANTUS) injection 15 Units, 15 Units, SubCUTAneous, QHS, Jonas, Shaan, DPM, 15 Units at 01/01/22 2223    atorvastatin (LIPITOR) tablet 10 mg, 10 mg, Oral, QHS, Jonas, Shaan, DPM, 10 mg at 01/01/22 2223    tamsulosin (FLOMAX) capsule 0.4 mg, 0.4 mg, Oral, QHS, Jonas, Shaan, DPM, 0.4 mg at 01/01/22 2223    gabapentin (NEURONTIN) capsule 600 mg, 600 mg, Oral, BID, Jonas, Shaan, DPM, 600 mg at 01/02/22 0829    sodium chloride (NS) flush 5-40 mL, 5-40 mL, IntraVENous, Q8H, Gil Mcdanielh, DPM, 10 mL at 01/02/22 0558    sodium chloride (NS) flush 5-40 mL, 5-40 mL, IntraVENous, PRN, Gil Mcdanielh, DPM    acetaminophen (TYLENOL) tablet 650 mg, 650 mg, Oral, Q6H PRN, 650 mg at 01/01/22 1706 **OR** acetaminophen (TYLENOL) suppository 650 mg, 650 mg, Rectal, Q6H PRN, Gil Mcdanielh, DPM    polyethylene glycol (MIRALAX) packet 17 g, 17 g, Oral, DAILY PRN, Shaan Mcdaniel, DPM    ondansetron (ZOFRAN ODT) tablet 4 mg, 4 mg, Oral, Q8H PRN **OR** ondansetron (ZOFRAN) injection 4 mg, 4 mg, IntraVENous, Q6H PRN, Shaan Mcdaniel, DPM    insulin lispro (HUMALOG) injection, , SubCUTAneous, AC&HS, Shaan Mcdaniel DPM, 2 Units at 01/02/22 0829    glucose chewable tablet 16 g, 4 Tablet, Oral, PRN, Shaan Mcdaniel DPM    dextrose (D50W) injection syrg 12.5-25 g, 12.5-25 g, IntraVENous, PRN, Shaan Mcdaniel DPM    glucagon (GLUCAGEN) injection 1 mg, 1 mg, IntraMUSCular, PRN, Shaan Mcdaniel DPM    metroNIDAZOLE (FLAGYL) IVPB premix 500 mg, 500 mg, IntraVENous, Q12H, Shaan Mcdaniel DPM, Last Rate: 100 mL/hr at 01/02/22 0210, 500 mg at 01/02/22 0210    cefepime (MAXIPIME) 2 g in 0.9% sodium chloride (MBP/ADV) 100 mL MBP, 2 g, IntraVENous, Q12H, Mahavadi, Shaan, DPM, Last Rate: 200 mL/hr at 01/02/22 0511, 2 g at 01/02/22 0511    vancomycin (VANCOCIN) 1,000 mg in 0.9% sodium chloride 250 mL (VIAL-MATE), 1,000 mg, IntraVENous, Q24H, Mahavadi, Shaan, DPM, Last Rate: 250 mL/hr at 01/01/22 2240, 1,000 mg at 01/01/22 2240     Procedures: see electronic medical records for all procedures/Xrays and details which were not copied into this note but were reviewed prior to creation of Plan. Reviewed most current lab test results and cultures  YES  Reviewed most current radiology test results   YES  Review and summation of old records today    NO  Reviewed patient's current orders and MAR    YES  PMH/ reviewed - no change compared to H&P  ________________________________________________________________________  Care Plan discussed with:    Comments   Patient x    Family      RN     Care Manager     Consultant                        Multidiciplinary team rounds were held today with , nursing, pharmacist and clinical coordinator. Patient's plan of care was discussed; medications were reviewed and discharge planning was addressed.      ________________________________________________________________________  Total NON critical care TIME:  30   Minutes    Total CRITICAL CARE TIME Spent:   Minutes non procedure based      Comments   >50% of visit spent in counseling and coordination of care x     This includes time during multidisciplinary rounds if indicated above   ________________________________________________________________________  Nicol Arteaga MD

## 2022-01-02 NOTE — PROGRESS NOTES
End of Shift Note    Bedside shift change report given to Silvia Carrillo RN (oncoming nurse) by Mague Man LPN (offgoing nurse). Report included the following information SBAR, Kardex, OR Summary, Procedure Summary, Intake/Output, MAR, Recent Results and Med Rec Status    Shift worked:  7m-690a     Shift summary and any significant changes:     Pt had uneventful night. Got up to the bathroom once. Pt treated for pain once-see MAR. Pt had no other complaints. Concerns for physician to address:       Zone phone for oncoming shift:          Activity:  Activity Level: Up with Assistance  Number times ambulated in hallways past shift: 0  Number of times OOB to chair past shift: 0    Cardiac:   Cardiac Monitoring: No      Cardiac Rhythm: Sinus Rhythm    Access:   Current line(s): PIV     Genitourinary:   Urinary status: voiding    Respiratory:   O2 Device: None (Room air)  Chronic home O2 use?: NO  Incentive spirometer at bedside: NO     GI:  Last Bowel Movement Date: 12/30/21  Current diet:  DIET ONE TIME MESSAGE  ADULT DIET Regular; 4 carb choices (60 gm/meal)  Passing flatus: YES  Tolerating current diet: YES       Pain Management:   Patient states pain is manageable on current regimen: YES    Skin:  Altaf Score: 19  Interventions: float heels, increase time out of bed and PT/OT consult    Patient Safety:  Fall Score:  Total Score: 2  Interventions: assistive device (walker, cane, etc), gripper socks, pt to call before getting OOB and stay with me (per policy)       Length of Stay:  Expected LOS: 2d 21h  Actual LOS: Kris Olivarez 173, LPN

## 2022-01-02 NOTE — PROGRESS NOTES
Pharmacy Antimicrobial Kinetic Dosing    Indication for Antimicrobials: Osteomyelitis     Current Regimen of Each Antimicrobial:  Vancomycin 1000 mg q24h (Start Date ; Day 7)  Cefepime 2 g IV q12h (Start Date ; Day 7)  Metronidazole 500 mg IV q12h (Start Date ; Day 7)    Previous Antimicrobial Therapy:    Goal Level: AUC: 400-600 mg/hr/Liter/day    Date Dose & Interval Measured (mcg/mL) Predicted AUC/PETRONA    2348 1g q24h 15.8 578    2248 1g q24h 12.4 405           Date & time of next level: pending    Dosing calculator used: [employer] calculator    Significant Positive Cultures:    blood: ngsf (pending)   wound: possible enterococcus @ pending    Conditions for Dosing Consideration: None    Labs:  Recent Labs     22  0308 21  0351   CREA 1.69* 1.57*   BUN 33* 35*     No results for input(s): WBC, BANDS in the last 72 hours. Temp (24hrs), Av °F (36.7 °C), Min:97.3 °F (36.3 °C), Max:98.7 °F (37.1 °C)    Creatinine Clearance (mL/min):   CrCl (Ideal Body Weight): 33.2   If actual weight < IBW: CrCl (Actual Body Weight) 40.4    Impression/Plan:   Continue cefepime 2g q12h for CrCl between 30-60 mL/min  Continue metronidazole 500 mg q12h   Trough resulted, calculated , low end of goal range  Will increase vanc regimen to 1250 mg IV Q24H, expected   BMP daily  Antimicrobial stop date TBD     Pharmacy will follow daily and adjust medications as appropriate for renal function and/or serum levels.     Thank you,  Hilton Prado, PHARMD    Vancomycin Dosing Document    Documents located on pharmacy Teams site: Clinical Practice -> Antimicrobial Stewardship -> Antibiotics_Vancomycin     Aminoglycoside Dosing Document    Documents located on pharmacy Teams site: Clinical Practice -> Antimicrobial Stewardship -> Antibiotics_Aminoglycosides

## 2022-01-02 NOTE — PROGRESS NOTES
Spiritual Care Assessment/Progress Note  [facility]      NAME: Dollie Gaucher Sr. MRN: [id number]  AGE: 80 y.o.  SEX: male  Jain Affiliation: Anabaptism   Language: English     1/2/2022     Total Time (in minutes): 15     Spiritual Assessment begun in [facility] 3 SURG TELE through conversation with:         []Patient        [] Family    [] Friend(s)        Reason for Consult: Initial/Spiritual assessment, patient floor     Spiritual beliefs: (Please include comment if needed)     [x] Identifies with a erasto tradition:         [] Supported by a erasto community:            [] Claims no spiritual orientation:           [] Seeking spiritual identity:                [] Adheres to an individual form of spirituality:           [] Not able to assess:                           Identified resources for coping:      [x] Prayer                               [] Music                  [] Guided Imagery     [x] Family/friends                 [] Pet visits     [] Devotional reading                         [] Unknown     [] Other:                                             Interventions offered during this visit: (See comments for more details)    Patient Interventions: Affirmation of emotions/emotional suffering,Affirmation of erasto,Catharsis/review of pertinent events in supportive environment,Iconic (affirming the presence of God/Higher Power),Initial/Spiritual assessment, patient floor,Normalization of emotional/spiritual concerns,Prayer (assurance of),Jain beliefs/image of God discussed           Plan of Care:     [] Support spiritual and/or cultural needs    [] Support AMD and/or advance care planning process      [] Support grieving process   [] Coordinate Rites and/or Rituals    [] Coordination with community clergy   [x] No spiritual needs identified at this time   [] Detailed Plan of Care below (See Comments)  [] Make referral to Music Therapy  [] Make referral to Pet Therapy     [] Make referral to Addiction services  [] Make referral to [facility]  [] Make referral to Spiritual Care Partner  [] No future visits requested        [x] Contact Spiritual Care for further referrals     Comments:   Reviewed chart prior to visit on Surg Tele for spiritual assessment. No family/friends present. Patient appeared to be in good spirits. Provided bedside presence and supportive listening as he shared how prayer helped him turn his life around many years ago and his long held belief in the power of prayer. He lost his wife of 33 years just two years ago and indicated that he misses her very much. He does have supportive daughters. One of his daughters and her  are ministers. Patient expressed no spiritual needs or concerns at this time but was receptive to assurance of prayer. Advised him of ongoing availability of pastoral support.      Yvette Bejarano, MPS, [address], Staff [address] [place] [address] Avenue    [address] Paging Service  [phone]

## 2022-01-02 NOTE — PROGRESS NOTES
Pt attempted ambulating with post op boot and cane today with daughter at bedside and nurse. Pt tolerated well post administration of pain medication.

## 2022-01-03 LAB
ANION GAP SERPL CALC-SCNC: 8 MMOL/L (ref 5–15)
BACTERIA SPEC CULT: ABNORMAL
BACTERIA SPEC CULT: ABNORMAL
BUN SERPL-MCNC: 41 MG/DL (ref 6–20)
BUN/CREAT SERPL: 22 (ref 12–20)
CALCIUM SERPL-MCNC: 8.9 MG/DL (ref 8.5–10.1)
CHLORIDE SERPL-SCNC: 109 MMOL/L (ref 97–108)
CO2 SERPL-SCNC: 20 MMOL/L (ref 21–32)
CREAT SERPL-MCNC: 1.87 MG/DL (ref 0.7–1.3)
GLUCOSE BLD STRIP.AUTO-MCNC: 152 MG/DL (ref 65–117)
GLUCOSE BLD STRIP.AUTO-MCNC: 198 MG/DL (ref 65–117)
GLUCOSE BLD STRIP.AUTO-MCNC: 203 MG/DL (ref 65–117)
GLUCOSE BLD STRIP.AUTO-MCNC: 241 MG/DL (ref 65–117)
GLUCOSE SERPL-MCNC: 138 MG/DL (ref 65–100)
POTASSIUM SERPL-SCNC: 4.9 MMOL/L (ref 3.5–5.1)
SERVICE CMNT-IMP: ABNORMAL
SODIUM SERPL-SCNC: 137 MMOL/L (ref 136–145)

## 2022-01-03 PROCEDURE — 97530 THERAPEUTIC ACTIVITIES: CPT

## 2022-01-03 PROCEDURE — 74011250636 HC RX REV CODE- 250/636: Performed by: PODIATRIST

## 2022-01-03 PROCEDURE — 74011000258 HC RX REV CODE- 258: Performed by: PODIATRIST

## 2022-01-03 PROCEDURE — 77010033678 HC OXYGEN DAILY

## 2022-01-03 PROCEDURE — 74011250637 HC RX REV CODE- 250/637: Performed by: NURSE PRACTITIONER

## 2022-01-03 PROCEDURE — 74011000250 HC RX REV CODE- 250: Performed by: PODIATRIST

## 2022-01-03 PROCEDURE — 36415 COLL VENOUS BLD VENIPUNCTURE: CPT

## 2022-01-03 PROCEDURE — 80202 ASSAY OF VANCOMYCIN: CPT

## 2022-01-03 PROCEDURE — 94760 N-INVAS EAR/PLS OXIMETRY 1: CPT

## 2022-01-03 PROCEDURE — 74011636637 HC RX REV CODE- 636/637: Performed by: PODIATRIST

## 2022-01-03 PROCEDURE — 80048 BASIC METABOLIC PNL TOTAL CA: CPT

## 2022-01-03 PROCEDURE — 65270000029 HC RM PRIVATE

## 2022-01-03 PROCEDURE — 97110 THERAPEUTIC EXERCISES: CPT

## 2022-01-03 PROCEDURE — 97535 SELF CARE MNGMENT TRAINING: CPT

## 2022-01-03 PROCEDURE — 74011250637 HC RX REV CODE- 250/637: Performed by: PODIATRIST

## 2022-01-03 PROCEDURE — 82962 GLUCOSE BLOOD TEST: CPT

## 2022-01-03 PROCEDURE — 97116 GAIT TRAINING THERAPY: CPT

## 2022-01-03 RX ORDER — ATENOLOL 50 MG/1
50 TABLET ORAL DAILY
Status: DISCONTINUED | OUTPATIENT
Start: 2022-01-04 | End: 2022-01-04 | Stop reason: HOSPADM

## 2022-01-03 RX ORDER — MORPHINE SULFATE 2 MG/ML
0.5 INJECTION, SOLUTION INTRAMUSCULAR; INTRAVENOUS
Status: DISCONTINUED | OUTPATIENT
Start: 2022-01-03 | End: 2022-01-04 | Stop reason: HOSPADM

## 2022-01-03 RX ORDER — OXYCODONE HYDROCHLORIDE 5 MG/1
2.5 TABLET ORAL
Status: DISCONTINUED | OUTPATIENT
Start: 2022-01-03 | End: 2022-01-04 | Stop reason: HOSPADM

## 2022-01-03 RX ORDER — LISINOPRIL 5 MG/1
10 TABLET ORAL DAILY
Status: DISCONTINUED | OUTPATIENT
Start: 2022-01-04 | End: 2022-01-04 | Stop reason: HOSPADM

## 2022-01-03 RX ADMIN — CEFEPIME 2 G: 2 INJECTION, POWDER, FOR SOLUTION INTRAVENOUS at 04:24

## 2022-01-03 RX ADMIN — METRONIDAZOLE 500 MG: 500 INJECTION, SOLUTION INTRAVENOUS at 14:00

## 2022-01-03 RX ADMIN — LISINOPRIL 20 MG: 20 TABLET ORAL at 09:09

## 2022-01-03 RX ADMIN — ATORVASTATIN CALCIUM 10 MG: 10 TABLET, FILM COATED ORAL at 22:03

## 2022-01-03 RX ADMIN — Medication 2 UNITS: at 09:09

## 2022-01-03 RX ADMIN — METRONIDAZOLE 500 MG: 500 INJECTION, SOLUTION INTRAVENOUS at 01:44

## 2022-01-03 RX ADMIN — ASPIRIN 81 MG: 81 TABLET, COATED ORAL at 09:09

## 2022-01-03 RX ADMIN — ACETAMINOPHEN 650 MG: 325 TABLET ORAL at 17:58

## 2022-01-03 RX ADMIN — Medication 3 UNITS: at 12:38

## 2022-01-03 RX ADMIN — ACETAMINOPHEN 650 MG: 325 TABLET ORAL at 02:04

## 2022-01-03 RX ADMIN — GABAPENTIN 600 MG: 300 CAPSULE ORAL at 17:58

## 2022-01-03 RX ADMIN — ACETAMINOPHEN 650 MG: 325 TABLET ORAL at 09:09

## 2022-01-03 RX ADMIN — FAMOTIDINE 20 MG: 20 TABLET, FILM COATED ORAL at 09:09

## 2022-01-03 RX ADMIN — GABAPENTIN 600 MG: 300 CAPSULE ORAL at 09:09

## 2022-01-03 RX ADMIN — ATENOLOL 100 MG: 100 TABLET ORAL at 09:09

## 2022-01-03 RX ADMIN — SODIUM CHLORIDE, PRESERVATIVE FREE 5 ML: 5 INJECTION INTRAVENOUS at 12:38

## 2022-01-03 RX ADMIN — CHOLECALCIFEROL TAB 25 MCG (1000 UNIT) 1000 UNITS: 25 TAB at 09:09

## 2022-01-03 RX ADMIN — ENOXAPARIN SODIUM 40 MG: 100 INJECTION SUBCUTANEOUS at 09:08

## 2022-01-03 RX ADMIN — TAMSULOSIN HYDROCHLORIDE 0.4 MG: 0.4 CAPSULE ORAL at 22:03

## 2022-01-03 RX ADMIN — Medication 2 UNITS: at 17:59

## 2022-01-03 RX ADMIN — Medication 3 UNITS: at 22:05

## 2022-01-03 RX ADMIN — SODIUM CHLORIDE, PRESERVATIVE FREE 10 ML: 5 INJECTION INTRAVENOUS at 06:37

## 2022-01-03 RX ADMIN — INSULIN GLARGINE 15 UNITS: 100 INJECTION, SOLUTION SUBCUTANEOUS at 22:04

## 2022-01-03 RX ADMIN — CEFEPIME 2 G: 2 INJECTION, POWDER, FOR SOLUTION INTRAVENOUS at 17:59

## 2022-01-03 NOTE — PROGRESS NOTES
End of Shift Note     Bedside shift change report given to Na Mariee RN (oncoming nurse) by Annabelle Smith LPN (offgoing nurse). Report included the following information SBAR, Kardex, OR Summary, Procedure Summary, Intake/Output, MAR, Recent Results and Med Rec Status     Shift worked:  3z-466a      Shift summary and any significant changes:      Pt had uneventful night. Pt rested well. Pt had no complaints. Pt got OOB to restroom prn.       Concerns for physician to address:        Zone phone for oncoming shift:            Activity:  Activity Level: Up with Assistance  Number times ambulated in hallways past shift: 0  Number of times OOB to chair past shift: 0     Cardiac:   Cardiac Monitoring: No      Cardiac Rhythm: Sinus Rhythm     Access:   Current line(s): PIV      Genitourinary:   Urinary status: voiding     Respiratory:   O2 Device: None (Room air)  Chronic home O2 use?: NO  Incentive spirometer at bedside: NO  GI:  Last Bowel Movement Date: 12/30/21  Current diet:  DIET ONE TIME MESSAGE  ADULT DIET Regular; 4 carb choices (60 gm/meal)  Passing flatus: YES  Tolerating current diet: YES     Pain Management:   Patient states pain is manageable on current regimen: YES     Skin:  Altaf Score: 19  Interventions: float heels, increase time out of bed and PT/OT consult    Patient Safety:  Fall Score:  Total Score: 2  Interventions: assistive device (walker, cane, etc), gripper socks, pt to call before getting OOB and stay with me (per policy)     Length of Stay:  Expected LOS: 2d 21h  Actual LOS: 5        Annabelle Smith LPN

## 2022-01-03 NOTE — PROGRESS NOTES
Problem: Self Care Deficits Care Plan (Adult)  Goal: *Acute Goals and Plan of Care (Insert Text)  Description: FUNCTIONAL STATUS PRIOR TO ADMISSION: Patient was modified independent using a single point cane for functional mobility. Patient was modified independent for basic and instrumental ADLs. HOME SUPPORT: The patient lived with sister in law who is moving out next month but did not require assist.    Occupational Therapy Goals  Initiated 12/31/2021  1. Patient will perform grooming standing at the sink with modified independence within 7 day(s). 2.  Patient will perform lower body dressing to include gathering of items with modified independence within 7 day(s). 3.  Patient will perform upper body dressing to include gathering of items with modified independence within 7 day(s). 4.  Patient will perform toilet transfers with modified independence within 7 day(s). 5.  Patient will perform all aspects of toileting with modified independence within 7 day(s). 6.  Patient will participate in upper extremity therapeutic exercise/activities with modified independence for 10 minutes within 7 day(s). 7.  Patient will utilize energy conservation techniques during functional activities with verbal cues within 7 day(s). Outcome: Progressing Towards Goal   OCCUPATIONAL THERAPY TREATMENT  Patient: [address] Sr. (80 y.o. male)  Date: 1/3/2022  Diagnosis: Osteomyelitis of right foot (HCC) [M86.9] Osteomyelitis of right foot (HCC)  Procedure(s) (LRB):  AMPUTATION RIGHT 4TH TOE (Right) 4 Days Post-Op  Precautions:    Chart, occupational therapy assessment, plan of care, and goals were reviewed. ASSESSMENT  Patient continues with skilled OT services and is progressing towards goals. Pt received seated EOB eating lunch independently, agreeable to participate. He donned L tennis shoe with setup, required mod A to don R post op shoe, able to doff without assistance.  Using RW pt ambulated into bathroom and performed toilet transfer with CGA, PWB RLE, verbal cues for safe transfer technique and RW management. He returned to sitting EOB at end of session with needs met, VSS. Pt is functioning below his mod I baseline at this time, and continues to benefit from skilled intervention to address the above impairments. Current Level of Function Impacting Discharge (ADLs): CGA transfers, independent-mod A ADLs    Other factors to consider for discharge: fall risk, below PLOF         PLAN :  Patient continues to benefit from skilled intervention to address the above impairments. Continue treatment per established plan of care to address goals. Recommendation for discharge: (in order for the patient to meet his/her long term goals)  Occupational therapy at least 2 days/week in the home AND ensure assist and/or supervision for safety with ADLs and mobility vs. SNF rehab if assistance is not available at home    This discharge recommendation:  Has not yet been discussed the attending provider and/or case management    IF patient discharges home will need the following DME: patient owns DME required for discharge       SUBJECTIVE:   Patient stated Radha Hoff went on a walk in the hallway today.     OBJECTIVE DATA SUMMARY:   Cognitive/Behavioral Status:  Neurologic State: Alert  Orientation Level: Oriented X4  Cognition: Follows commands  Perception: Appears intact  Perseveration: No perseveration noted  Safety/Judgement: Awareness of environment; Fall prevention    Functional Mobility and Transfers for ADLs:  Bed Mobility:  Rolling: Supervision  Supine to Sit: Supervision  Sit to Supine: Supervision  Scooting: Supervision    Transfers:  Sit to Stand: Contact guard assistance (pull to stand - VC for hand placement)  Functional Transfers  Toilet Transfer : Contact guard assistance; Adaptive equipment       Balance:  Sitting: Intact  Standing: Impaired  Standing - Static: Constant support;Good  Standing - Dynamic : Constant support;Good    ADL Intervention:  Feeding  Utensil Management: Independent  Food to Mouth: Independent  Drink to Mouth: Independent    Grooming  Position Performed: Seated edge of bed  Washing Hands: Set-up      Lower Body Dressing Assistance  Shoes with Velcro: Moderate assistance (A to don, pt doffed)  Shoes with Cloth Laces: Set-up  Leg Crossed Method Used: Yes  Position Performed: Seated edge of bed         Cognitive Retraining  Safety/Judgement: Awareness of environment; Fall prevention    Pain:  Pt did not c/o pain today    Activity Tolerance:   Good    After treatment patient left in no apparent distress:   Call bell within reach and Sitting EOB, nurse informed    COMMUNICATION/COLLABORATION:   The patients plan of care was discussed with: Physical therapist and Registered nurse.      Saturnino Schuler OT  Time Calculation: 17 mins

## 2022-01-03 NOTE — PROGRESS NOTES
Problem: Mobility Impaired (Adult and Pediatric)  Goal: *Acute Goals and Plan of Care (Insert Text)  Description: FUNCTIONAL STATUS PRIOR TO ADMISSION: Patient was modified independent using a SB quad cane for functional mobility. HOME SUPPORT PRIOR TO ADMISSION: The patient lived with sister-in-law but did not require assist. Pato Ma will be moving out in the next month. Physical Therapy Goals  Initiated 12/31/2021  1. Patient will move from supine to sit and sit to supine  in bed with independence within 7 day(s). 2.  Patient will transfer from bed to chair and chair to bed with modified independence using the least restrictive device within 7 day(s). 3.  Patient will perform sit to stand with modified independence within 7 day(s). 4.  Patient will ambulate with modified independence for 50 feet with the least restrictive device within 7 day(s). 5.  Patient will ascend/descend 2 stairs with no handrail(s) with minimal assistance/contact guard assist within 7 day(s). Outcome: Progressing Towards Goal    PHYSICAL THERAPY TREATMENT  Patient: Delilah Ag (80 y.o. male)  Date: 1/3/2022  Diagnosis: Osteomyelitis of right foot (HCC) [M86.9] Osteomyelitis of right foot (HCC)  Procedure(s) (LRB):  AMPUTATION RIGHT 4TH TOE (Right) 4 Days Post-Op  Precautions:    Chart, physical therapy assessment, plan of care and goals were reviewed. ASSESSMENT  Patient continues with skilled PT services and is progressing towards goals. Pt received supine in bed and willing to work with therapy. Pt reports being to bathroom with nursing using his post op shoe today, but unaware of WBing status. Pt limited by pain, decreased strength, balance, endurance and adherence to partial WBing with amb. Pt able to tolerate bed mobility to R side EOB with no assistance needed, assisted needed with donning post op shoe along with L shoe for amb.  Pt continued with STS with CGA, followed by gait training up to 100' with RW, needing max VC for RW placement and WBing reminders. Pt reports no increased pain with amb, noted he does not use much UE for support to off load R foot with amb. Upon return to room, pt reported feeling it in his thighs mostly during amb. Pt continued with sit>sup, followed by LE ex with VC for form, noted bilateral quad weakness. Pt completed session with call bell within reach and all needs met at the time. Rn notified of session. Current Level of Function Impacting Discharge (mobility/balance): CGA for STS and amb up to 100' with post op shoe, and RW    Other factors to consider for discharge: fall risk, following partial WBing         PLAN :  Patient continues to benefit from skilled intervention to address the above impairments. Continue treatment per established plan of care. to address goals. Recommendation for discharge: (in order for the patient to meet his/her long term goals)  Physical therapy at least 2 days/week in the home AND ensure assist and/or supervision for safety with mobility/ otherwise rehab with snf if supervision unavailable. This discharge recommendation:  Has not yet been discussed the attending provider and/or case management    IF patient discharges home will need the following DME: rolling walker       SUBJECTIVE:   Patient stated I hope not to have to use my cane someday. Philippe Topete    OBJECTIVE DATA SUMMARY:   Critical Behavior:  Neurologic State: Alert  Orientation Level: Oriented X4  Cognition: Follows commands  Safety/Judgement: Awareness of environment  Functional Mobility Training:  Bed Mobility:  Rolling: Supervision  Supine to Sit: Supervision  Sit to Supine: Supervision  Scooting: Supervision    Transfers:  Sit to Stand: Contact guard assistance (pull to stand - VC for hand placement)  Stand to Sit: Contact guard assistance     Balance:  Sitting: Intact  Standing: Impaired  Standing - Static: Constant support;Good  Standing - Dynamic : Constant support;Good    Ambulation/Gait Training:  Distance (ft): 100 Feet (ft)  Assistive Device: Gait belt;Walker, rolling;Orthotic device (post op shoe)  Ambulation - Level of Assistance: Contact guard assistance  Gait Abnormalities: Antalgic;Decreased step clearance  Left Side Weight Bearing: Partial (%) (with post op shoe)  Base of Support: Widened  Stance: Left decreased  Speed/Zenobia: Pace decreased (<100 feet/min)  Step Length: Left shortened;Right shortened    Therapeutic Exercises:   Supine LE ex- AP, knee presses, SLR x 10 ea    Pain Rating:  3/10 with activity    Activity Tolerance:   Good and requires rest breaks    After treatment patient left in no apparent distress:   Supine in bed and Call bell within reach    COMMUNICATION/COLLABORATION:   The patients plan of care was discussed with: Registered nurse.      Anitha Scruggs PTA   Time Calculation: 38 mins

## 2022-01-03 NOTE — PROGRESS NOTES
34.5 Mirella Benavidez pt's daughter requesting to speak to physician regarding concerns about his pain medication and plan of care. Sent message to Dr. Rm Fernandez to call daughter since she cannot visit. [phone] - Informed pt of daughter's concerns. Pt expresses that he slept and woke up during the night, not realizing the time because he usually sleeps all night and wakes at 0500. Pt denies any confusion last night and is completely oriented this morning.

## 2022-01-03 NOTE — PROGRESS NOTES
Hospitalist Progress Note    NAME: Justina Anaya .   :  1940   MRN:  [id number]       Assessment / Plan:    Osteomyelitis of right foot  Start cefepime, vancomycin, and Flagyl IV  Podiatry consulted  Pain management   Blood cultures neg so far  Got ortho boot  Lower pain meds doses  S/p amputation, had wound cultures after Sx? Cx + for Staph/yeast  Awaiting OR pathology      Hypertension   PVD,  left MADDISON atherectomy and BAP on 2021   CAD, s/p remote stent  S/p LLE  S/p CEA  STELLA right resting STELLA is mildly decreased. Right TBI is 0.26  Rt LE angio as per Vascular as outpt  Hold norvasc/HCTZ/Lisinopril given soft BP  Lower atenolol dose with holding parameters      Diabetes  POC before meals and at bedtime blood glucose monitoring with sliding scale coverage  Holding oral antihypoglycemics for now  Increase Lantus after Sx  hemoglobin A1c 11.8    CKD stage III  Renal function close to baseline  Avoid nephrotoxic meds    Code Status:  Full code  Surrogate Decision Maker:  Donavon Maya (daughter) [phone]  DVT Prophylaxis: Lovenox  GI Prophylaxis: Famotidine  Activity at baseline: cane  Lives with: self  Recommended Disposition: Home w/Family  Anticipated Discharge Date:  24-48 hours        Subjective:     Discussed with RN events overnight. Afebrile  No CP  No SOB  Some confusion last night, pt woke before 6PM and though it was 6AM    Review of Systems:  Symptom Y/N Comments  Symptom Y/N Comments   Fever/Chills    Chest Pain     Poor Appetite    Edema     Cough    Abdominal Pain     Sputum    Joint Pain     SOB/LEW    Pruritis/Rash     Nausea/vomit    Tolerating PT/OT     Diarrhea    Tolerating Diet     Constipation    Other       PO intake: No data found.     Wt Readings from Last 10 Encounters:   21 83.3 kg (183 lb 10.3 oz)   04/10/21 88 kg (194 lb 0.1 oz)   19 85.2 kg (187 lb 13.3 oz)   02/16/17 91.6 kg (201 lb 15.1 oz)   11/25/15 93.1 kg (205 lb 4 oz)   10/29/13 88 kg (194 lb)   03/24/12 90 kg (198 lb 6 oz)   03/22/12 88.5 kg (195 lb)   03/19/12 88.5 kg (195 lb 1 oz)   05/30/10 90.3 kg (199 lb)       Objective:     VITALS:   Last 24hrs VS reviewed since prior progress note. Most recent are:  Patient Vitals for the past 24 hrs:   Temp Pulse Resp BP SpO2   01/03/22 1451 97.6 °F (36.4 °C) 66 17 (!) 94/42 98 %   01/03/22 1151 97.7 °F (36.5 °C) 66 17 (!) 100/59 96 %   01/03/22 0807 97.5 °F (36.4 °C) 65 17 136/82 96 %   01/03/22 0250 98.2 °F (36.8 °C) 71 16 (!) 111/59 98 %   01/02/22 2227 97.8 °F (36.6 °C) 68 16 119/76 97 %   01/02/22 2016 97.5 °F (36.4 °C) 68 16 (!) 112/51 98 %   01/02/22 1527 97.5 °F (36.4 °C) 65 18 (!) 124/51 97 %     No intake or output data in the 24 hours ending 01/03/22 1511     I had a face to face encounter, and independently examined this patient on 1/3/2022, as outlined below:    PHYSICAL EXAM:  General:    No distress     HEENT: Atraumatic, anicteric sclerae, pink conjunctivae, MMM  Neck:  Supple, symmetrical  Lungs:   CTA. No Wheezing/Rhonchi. No rales. No tenderness. No Accessory muscle use. Heart:   Regular rhythm. No murmur. No JVD. Non palpable DP pulses. GI/:   Soft. NT. ND. BS normal  Extremities: No edema. No cyanosis. No clubbing. Skin:     Not pale. Not Jaundiced. R foot covered by dressing   Psych:  Good insight. Not depressed. Not anxious or agitated. Neurologic: Alert and oriented X 4. EOMs intact. No facial asymmetry. No slurred speech. Symmetrical strength, Sensation grossly intact. Labs     I reviewed today's most current labs and imaging studies. Pertinent labs include:  No results for input(s): WBC, HGB, HCT, PLT, HGBEXT, HCTEXT, PLTEXT, HGBEXT, HCTEXT, PLTEXT in the last 72 hours.   Recent Labs     01/03/22  0201 01/01/22  0308    137   K 4.9 4.4   * 109*   CO2 20* 22   * 131*   BUN 41* 33*   CREA 1.87* 1.69*   CA 8.9 8.9     XR FOOT RT MIN 3 V    Result Date: 12/27/2021  No acute fracture or dislocation. MRI FOOT RT W WO CONT    Result Date: 12/28/2021  1. Osteomyelitis of the fourth toe proximal, middle and distal phalanges. 2. Query early septic arthritis of fifth toe PIP joint. XR CHEST PORT    Result Date: 12/27/2021  No acute process identified. No results found. Current Medications:     Current Facility-Administered Medications:     [START ON 1/4/2022] lisinopriL (PRINIVIL, ZESTRIL) tablet 10 mg, 10 mg, Oral, DAILY, Sophy Song MD  Verl Raw  [START ON 1/4/2022] atenoloL (TENORMIN) tablet 75 mg, 75 mg, Oral, DAILY, Sophy Song MD    oxyCODONE IR (ROXICODONE) tablet 2.5 mg, 2.5 mg, Oral, Q6H PRN, Sophy Song MD    morphine injection 0.5 mg, 0.5 mg, IntraVENous, Q4H PRN, Sophy Song MD    Vancomycin - please draw level prior to dose on 1/3 at 2300.   Thank you!, 1 Each, Other, ONCE, Sophy Song MD    vancomycin (VANCOCIN) 1250 mg in  ml infusion, 1,250 mg, IntraVENous, Q24H, Sophy Song MD, Last Rate: 125 mL/hr at 01/02/22 2237, 1,250 mg at 01/02/22 2237    famotidine (PEPCID) tablet 20 mg, 20 mg, Oral, DAILY, Shaan Mcdaniel, DPM, 20 mg at 01/03/22 0909    sodium chloride (NS) flush 5-40 mL, 5-40 mL, IntraVENous, Q8H, Shaan Mcdaniel, DPM, 5 mL at 01/03/22 1238    enoxaparin (LOVENOX) injection 40 mg, 40 mg, SubCUTAneous, DAILY, Shaan Mcdaniel, DPM, 40 mg at 01/03/22 0908    [Held by provider] HYDROcodone-acetaminophen ([address]) 7.5-325 mg per tablet 1 Tablet, 1 Tablet, Oral, Q4H PRN, Shaan Mcdaniel DPM    [Held by provider] amLODIPine (NORVASC) tablet 5 mg, 5 mg, Oral, DAILY, Hiroor, Atsushilsa, ACNP, 5 mg at 12/29/21 0905    aspirin delayed-release tablet 81 mg, 81 mg, Oral, DAILY, Vinny Parsons, ACNP, 81 mg at 01/03/22 0909    cholecalciferol (VITAMIN D3) (1000 Units /25 mcg) tablet 1,000 Units, 1,000 Units, Oral, DAILY, Jakob Barillas, DPM, 1,000 Units at 01/03/22 0909    hydroCHLOROthiazide (HYDRODIURIL) tablet 25 mg, 25 mg, Oral, QHS, Jonas, Shaan, DPM, 25 mg at 01/02/22 2227    insulin glargine (LANTUS) injection 15 Units, 15 Units, SubCUTAneous, QHS, Jonas, Shaan, DPM, 15 Units at 01/02/22 2226    atorvastatin (LIPITOR) tablet 10 mg, 10 mg, Oral, QHS, Karenavaalbaro, Shaan, DPM, 10 mg at 01/02/22 2227    tamsulosin (FLOMAX) capsule 0.4 mg, 0.4 mg, Oral, QHS, Jonas, Shaan, DPM, 0.4 mg at 01/02/22 2227    gabapentin (NEURONTIN) capsule 600 mg, 600 mg, Oral, BID, Jonas, Shaan, DPM, 600 mg at 01/03/22 0909    sodium chloride (NS) flush 5-40 mL, 5-40 mL, IntraVENous, Q8H, Shaan Mcdaniel, DPM, 10 mL at 01/03/22 0270    sodium chloride (NS) flush 5-40 mL, 5-40 mL, IntraVENous, PRN, Shaan Mcdaniel, DPM    acetaminophen (TYLENOL) tablet 650 mg, 650 mg, Oral, Q6H PRN, 650 mg at 01/03/22 0909 **OR** acetaminophen (TYLENOL) suppository 650 mg, 650 mg, Rectal, Q6H PRN, Shaan Mcdaniel, DPM    polyethylene glycol (MIRALAX) packet 17 g, 17 g, Oral, DAILY PRN, Shaan Mcdaniel, DPM    ondansetron (ZOFRAN ODT) tablet 4 mg, 4 mg, Oral, Q8H PRN **OR** ondansetron (ZOFRAN) injection 4 mg, 4 mg, IntraVENous, Q6H PRN, Gil Mcdanielh, DPM    insulin lispro (HUMALOG) injection, , SubCUTAneous, AC&HS, Shaan Mcdaniel, DPM, 3 Units at 01/03/22 1238    glucose chewable tablet 16 g, 4 Tablet, Oral, PRN, Shaan Mcdaniel, DPM    dextrose (D50W) injection syrg 12.5-25 g, 12.5-25 g, IntraVENous, PRN, Shaan Mcdaniel, DPM    glucagon (GLUCAGEN) injection 1 mg, 1 mg, IntraMUSCular, PRN, Shaan Mcdaniel, DPM    metroNIDAZOLE (FLAGYL) IVPB premix 500 mg, 500 mg, IntraVENous, Q12H, Shaan Mcdaniel, DPM, Last Rate: 100 mL/hr at 01/03/22 0144, 500 mg at 01/03/22 0144    cefepime (MAXIPIME) 2 g in 0.9% sodium chloride (MBP/ADV) 100 mL MBP, 2 g, IntraVENous, Q12H, Shaan Mcdaniel, DPM, Last Rate: 200 mL/hr at 01/03/22 0424, 2 g at 01/03/22 0424     Procedures: see electronic medical records for all procedures/Xrays and details which were not copied into this note but were reviewed prior to creation of Plan. Reviewed most current lab test results and cultures  YES  Reviewed most current radiology test results   YES  Review and summation of old records today    NO  Reviewed patient's current orders and MAR    YES  PMH/SH reviewed - no change compared to H&P  ________________________________________________________________________  Care Plan discussed with:    Comments   Patient x    Family  x    RN x    Care Manager     Consultant                        Multidiciplinary team rounds were held today with , nursing, pharmacist and clinical coordinator. Patient's plan of care was discussed; medications were reviewed and discharge planning was addressed.      ________________________________________________________________________  Total NON critical care TIME:  30   Minutes    Total CRITICAL CARE TIME Spent:   Minutes non procedure based      Comments   >50% of visit spent in counseling and coordination of care x     This includes time during multidisciplinary rounds if indicated above   ________________________________________________________________________  Destiny Duarte MD

## 2022-01-04 VITALS
WEIGHT: 183.64 LBS | HEIGHT: 68 IN | SYSTOLIC BLOOD PRESSURE: 139 MMHG | BODY MASS INDEX: 27.83 KG/M2 | OXYGEN SATURATION: 100 % | RESPIRATION RATE: 19 BRPM | HEART RATE: 60 BPM | TEMPERATURE: 97.4 F | DIASTOLIC BLOOD PRESSURE: 58 MMHG

## 2022-01-04 LAB
ANION GAP SERPL CALC-SCNC: 6 MMOL/L (ref 5–15)
BASOPHILS # BLD: 0.1 K/UL (ref 0–0.1)
BASOPHILS NFR BLD: 1 % (ref 0–1)
BUN SERPL-MCNC: 38 MG/DL (ref 6–20)
BUN/CREAT SERPL: 24 (ref 12–20)
CALCIUM SERPL-MCNC: 9.2 MG/DL (ref 8.5–10.1)
CHLORIDE SERPL-SCNC: 112 MMOL/L (ref 97–108)
CO2 SERPL-SCNC: 21 MMOL/L (ref 21–32)
CREAT SERPL-MCNC: 1.58 MG/DL (ref 0.7–1.3)
DIFFERENTIAL METHOD BLD: ABNORMAL
EOSINOPHIL # BLD: 0.4 K/UL (ref 0–0.4)
EOSINOPHIL NFR BLD: 8 % (ref 0–7)
ERYTHROCYTE [DISTWIDTH] IN BLOOD BY AUTOMATED COUNT: 12.7 % (ref 11.5–14.5)
GLUCOSE BLD STRIP.AUTO-MCNC: 136 MG/DL (ref 65–117)
GLUCOSE BLD STRIP.AUTO-MCNC: 185 MG/DL (ref 65–117)
GLUCOSE BLD STRIP.AUTO-MCNC: 238 MG/DL (ref 65–117)
GLUCOSE SERPL-MCNC: 145 MG/DL (ref 65–100)
HCT VFR BLD AUTO: 32.5 % (ref 36.6–50.3)
HGB BLD-MCNC: 10.9 G/DL (ref 12.1–17)
IMM GRANULOCYTES # BLD AUTO: 0 K/UL (ref 0–0.04)
IMM GRANULOCYTES NFR BLD AUTO: 0 % (ref 0–0.5)
LYMPHOCYTES # BLD: 1.4 K/UL (ref 0.8–3.5)
LYMPHOCYTES NFR BLD: 27 % (ref 12–49)
MCH RBC QN AUTO: 30.2 PG (ref 26–34)
MCHC RBC AUTO-ENTMCNC: 33.5 G/DL (ref 30–36.5)
MCV RBC AUTO: 90 FL (ref 80–99)
MONOCYTES # BLD: 0.5 K/UL (ref 0–1)
MONOCYTES NFR BLD: 10 % (ref 5–13)
NEUTS SEG # BLD: 2.8 K/UL (ref 1.8–8)
NEUTS SEG NFR BLD: 54 % (ref 32–75)
NRBC # BLD: 0 K/UL (ref 0–0.01)
NRBC BLD-RTO: 0 PER 100 WBC
PLATELET # BLD AUTO: 162 K/UL (ref 150–400)
PMV BLD AUTO: 9.7 FL (ref 8.9–12.9)
POTASSIUM SERPL-SCNC: 4.9 MMOL/L (ref 3.5–5.1)
RBC # BLD AUTO: 3.61 M/UL (ref 4.1–5.7)
SERVICE CMNT-IMP: ABNORMAL
SODIUM SERPL-SCNC: 139 MMOL/L (ref 136–145)
VANCOMYCIN SERPL-MCNC: 18.4 UG/ML
WBC # BLD AUTO: 5.1 K/UL (ref 4.1–11.1)

## 2022-01-04 PROCEDURE — 82962 GLUCOSE BLOOD TEST: CPT

## 2022-01-04 PROCEDURE — 0Y6V0Z0 DETACHMENT AT RIGHT 4TH TOE, COMPLETE, OPEN APPROACH: ICD-10-PCS | Performed by: PODIATRIST

## 2022-01-04 PROCEDURE — 94760 N-INVAS EAR/PLS OXIMETRY 1: CPT

## 2022-01-04 PROCEDURE — 2709999900 HC NON-CHARGEABLE SUPPLY

## 2022-01-04 PROCEDURE — 77010033678 HC OXYGEN DAILY

## 2022-01-04 PROCEDURE — 74011636637 HC RX REV CODE- 636/637: Performed by: PODIATRIST

## 2022-01-04 PROCEDURE — 74011250637 HC RX REV CODE- 250/637: Performed by: PODIATRIST

## 2022-01-04 PROCEDURE — 74011000258 HC RX REV CODE- 258: Performed by: PODIATRIST

## 2022-01-04 PROCEDURE — 74011000250 HC RX REV CODE- 250: Performed by: PODIATRIST

## 2022-01-04 PROCEDURE — 85025 COMPLETE CBC W/AUTO DIFF WBC: CPT

## 2022-01-04 PROCEDURE — 74011250636 HC RX REV CODE- 250/636: Performed by: PODIATRIST

## 2022-01-04 PROCEDURE — 97116 GAIT TRAINING THERAPY: CPT

## 2022-01-04 PROCEDURE — 74011250636 HC RX REV CODE- 250/636: Performed by: GENERAL ACUTE CARE HOSPITAL

## 2022-01-04 PROCEDURE — 80048 BASIC METABOLIC PNL TOTAL CA: CPT

## 2022-01-04 PROCEDURE — 74011250637 HC RX REV CODE- 250/637: Performed by: GENERAL ACUTE CARE HOSPITAL

## 2022-01-04 PROCEDURE — 36415 COLL VENOUS BLD VENIPUNCTURE: CPT

## 2022-01-04 PROCEDURE — 97530 THERAPEUTIC ACTIVITIES: CPT

## 2022-01-04 PROCEDURE — 74011250637 HC RX REV CODE- 250/637: Performed by: NURSE PRACTITIONER

## 2022-01-04 PROCEDURE — 97535 SELF CARE MNGMENT TRAINING: CPT

## 2022-01-04 RX ORDER — GLIPIZIDE 10 MG/1
10 TABLET ORAL 2 TIMES DAILY
Qty: 60 TABLET | Refills: 1 | Status: SHIPPED | OUTPATIENT
Start: 2022-01-04 | End: 2022-02-03

## 2022-01-04 RX ORDER — ATENOLOL 50 MG/1
50 TABLET ORAL DAILY
Qty: 30 TABLET | Refills: 1 | Status: SHIPPED | OUTPATIENT
Start: 2022-01-04 | End: 2022-02-03

## 2022-01-04 RX ORDER — DOXYCYCLINE 100 MG/1
100 CAPSULE ORAL 2 TIMES DAILY
Qty: 20 CAPSULE | Refills: 0 | Status: SHIPPED | OUTPATIENT
Start: 2022-01-04 | End: 2022-01-14

## 2022-01-04 RX ADMIN — Medication 2 UNITS: at 17:04

## 2022-01-04 RX ADMIN — SODIUM CHLORIDE, PRESERVATIVE FREE 10 ML: 5 INJECTION INTRAVENOUS at 06:15

## 2022-01-04 RX ADMIN — GABAPENTIN 600 MG: 300 CAPSULE ORAL at 17:04

## 2022-01-04 RX ADMIN — ACETAMINOPHEN 650 MG: 325 TABLET ORAL at 06:20

## 2022-01-04 RX ADMIN — Medication 3 UNITS: at 12:39

## 2022-01-04 RX ADMIN — ASPIRIN 81 MG: 81 TABLET, COATED ORAL at 09:26

## 2022-01-04 RX ADMIN — ENOXAPARIN SODIUM 40 MG: 100 INJECTION SUBCUTANEOUS at 09:27

## 2022-01-04 RX ADMIN — METRONIDAZOLE 500 MG: 500 INJECTION, SOLUTION INTRAVENOUS at 13:01

## 2022-01-04 RX ADMIN — GABAPENTIN 600 MG: 300 CAPSULE ORAL at 09:26

## 2022-01-04 RX ADMIN — SODIUM CHLORIDE, PRESERVATIVE FREE 10 ML: 5 INJECTION INTRAVENOUS at 02:56

## 2022-01-04 RX ADMIN — FAMOTIDINE 20 MG: 20 TABLET, FILM COATED ORAL at 09:26

## 2022-01-04 RX ADMIN — SODIUM CHLORIDE, PRESERVATIVE FREE 10 ML: 5 INJECTION INTRAVENOUS at 06:16

## 2022-01-04 RX ADMIN — ATENOLOL 50 MG: 50 TABLET ORAL at 09:31

## 2022-01-04 RX ADMIN — CEFEPIME 2 G: 2 INJECTION, POWDER, FOR SOLUTION INTRAVENOUS at 04:48

## 2022-01-04 RX ADMIN — VANCOMYCIN HYDROCHLORIDE 1250 MG: 10 INJECTION, POWDER, LYOPHILIZED, FOR SOLUTION INTRAVENOUS at 00:01

## 2022-01-04 RX ADMIN — SODIUM CHLORIDE, PRESERVATIVE FREE 10 ML: 5 INJECTION INTRAVENOUS at 13:02

## 2022-01-04 RX ADMIN — METRONIDAZOLE 500 MG: 500 INJECTION, SOLUTION INTRAVENOUS at 02:55

## 2022-01-04 RX ADMIN — CHOLECALCIFEROL TAB 25 MCG (1000 UNIT) 1000 UNITS: 25 TAB at 09:26

## 2022-01-04 NOTE — PROGRESS NOTES
Problem: Mobility Impaired (Adult and Pediatric)  Goal: *Acute Goals and Plan of Care (Insert Text)  Description: FUNCTIONAL STATUS PRIOR TO ADMISSION: Patient was modified independent using a SB quad cane for functional mobility. HOME SUPPORT PRIOR TO ADMISSION: The patient lived with sister-in-law but did not require assist. Joel Hector will be moving out in the next month. Physical Therapy Goals  Initiated 12/31/2021  1. Patient will move from supine to sit and sit to supine  in bed with independence within 7 day(s). 2.  Patient will transfer from bed to chair and chair to bed with modified independence using the least restrictive device within 7 day(s). 3.  Patient will perform sit to stand with modified independence within 7 day(s). 4.  Patient will ambulate with modified independence for 50 feet with the least restrictive device within 7 day(s). 5.  Patient will ascend/descend 2 stairs with no handrail(s) with minimal assistance/contact guard assist within 7 day(s). Outcome: Progressing Towards Goal   PHYSICAL THERAPY TREATMENT  Patient: Angélica Marie (80 y.o. male)  Date: 1/4/2022  Diagnosis: Osteomyelitis of right foot (HCC) [M86.9] Osteomyelitis of right foot (HCC)  Procedure(s) (LRB):  AMPUTATION RIGHT 4TH TOE (Right) 5 Days Post-Op  Precautions:    Chart, physical therapy assessment, plan of care and goals were reviewed. ASSESSMENT  Patient continues with skilled PT services and is progressing towards goals. Pt received ambulating to the bathroom with PCT, without his post op shoe on. Education provided regarding why he needs to wear the post op shoe at all times with transfers and ambulation. Pt verbalizing understanding and agrees to comply. Overall pt is moving well and reports his pain is tolerable.       Current Level of Function Impacting Discharge (mobility/balance): sba/cga, safety verbal cues    Other factors to consider for discharge: will most likely be home alone during the day         PLAN :  Patient continues to benefit from skilled intervention to address the above impairments. Continue treatment per established plan of care. to address goals. Recommendation for discharge: (in order for the patient to meet his/her long term goals)  Physical therapy at least 2 days/week in the home AND ensure assist and/or supervision for safety with mobility    This discharge recommendation:  Has been made in collaboration with the attending provider and/or case management    IF patient discharges home will need the following DME: rolling walker       SUBJECTIVE:   Patient stated \" my sister in law works during the day.     OBJECTIVE DATA SUMMARY:   Critical Behavior:  Neurologic State: Alert  Orientation Level: Oriented X4  Cognition: Follows commands  Safety/Judgement: Awareness of environment,Fall prevention  Functional Mobility Training:  Bed Mobility:  Rolling: Modified independent  Supine to Sit: Modified independent     Scooting: Modified independent        Transfers:  Sit to Stand: Stand-by assistance  Stand to Sit: Stand-by assistance            Balance:  Sitting: Intact  Standing: Impaired  Standing - Static: Good  Standing - Dynamic : Constant support;Good  Ambulation/Gait Training:  Distance (ft): 50 Feet (ft)  Assistive Device: Gait belt;Orthotic device; Walker, rolling (post op shoe)  Ambulation - Level of Assistance: Stand-by assistance;Contact guard assistance        Gait Abnormalities: Antalgic; Step to gait     Left Side Weight Bearing: Partial (%) (with post op shoe)  Base of Support: Widened  Stance: Right decreased  Speed/Zenobia: Pace decreased (<100 feet/min)  Step Length: Left shortened;Right shortened     Pain Ratin/10    Activity Tolerance:   Good    After treatment patient left in no apparent distress:   Sitting in chair, Call bell within reach, and RLE elevated on bed    COMMUNICATION/COLLABORATION:   The patients plan of care was discussed with: Occupational therapist, Registered nurse, and Case management.      Messi Orlando, PT   Time Calculation: 23 mins

## 2022-01-04 NOTE — PROGRESS NOTES
Problem: Self Care Deficits Care Plan (Adult)  Goal: *Acute Goals and Plan of Care (Insert Text)  Description: FUNCTIONAL STATUS PRIOR TO ADMISSION: Patient was modified independent using a single point cane for functional mobility. Patient was modified independent for basic and instrumental ADLs. 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 HOME SUPPORT: The patient lived with sister in law who is moving out next month; he did not require assist.    Occupational Therapy Goals  Initiated 12/31/2021  1. Patient will perform grooming standing at the sink with modified independence within 7 day(s). 2.  Patient will perform lower body dressing to include gathering of items with modified independence within 7 day(s). 3.  Patient will perform upper body dressing to include gathering of items with modified independence within 7 day(s). 4.  Patient will perform toilet transfers with modified independence within 7 day(s). 5.  Patient will perform all aspects of toileting with modified independence within 7 day(s). 6.  Patient will participate in upper extremity therapeutic exercise/activities with modified independence for 10 minutes within 7 day(s). 7.  Patient will utilize energy conservation techniques during functional activities with verbal cues within 7 day(s). Outcome: Progressing Towards Goal   OCCUPATIONAL THERAPY TREATMENT  Patient: Evonne Rust Sr. (80 y.o. male)  Date: 1/4/2022  Diagnosis: Osteomyelitis of right foot (HCC) [M86.9] Osteomyelitis of right foot (HCC)  Procedure(s) (LRB):  AMPUTATION RIGHT 4TH TOE (Right) 5 Days Post-Op  Precautions: Skin (WBAT R post op shoe)  Chart, occupational therapy assessment, plan of care, and goals were reviewed. ASSESSMENT  Patient continues with skilled OT services and is progressing towards goals. Good participation and nice progress towards goals. Tolerant of R foot post op pain, 4/10 this session.  Received on toileting not wearing post op shoe; eventually receptive to training of fall prevention, wound/post op protection and need for consistency joshua with toileting due to high number of falls associated with rushing to bathroom, as urgency was his reason for not stopping to don post op shoe. Presents as cognitive intact but not formally assessed. Current Level of Function Impacting Discharge (ADLs):set up/I UE ADLs, S-SBA LE ADLs, SBA-S functional mobility with use RW, VC needed for safe/effective DME use and post op safety training    Other factors to consider for discharge: very good carryover of new information provided; receptive to [facility] therapy to facilitate carryover in the home         PLAN :  Patient continues to benefit from skilled intervention to address the above impairments. Continue treatment per established plan of care to address goals. Recommend with staff: post op shoe on for all transfers    Recommend next OT session: theraband HEP; SBA self care tasks at ambulatory level    Recommendation for discharge: (in order for the patient to meet his/her long term goals)  Occupational therapy at least 2 days/week in the home AND ensure assist and/or supervision for safety with bathing, IADLs    This discharge recommendation:  Has been made in collaboration with the attending provider and/or case management    IF patient discharges home will need the following DME: owns DME from wife w/cancer       SUBJECTIVE:   Patient stated \"I see now; I will put the shoe on every time.     OBJECTIVE DATA SUMMARY:   Cognitive/Behavioral Status:  Neurologic State: Alert  Orientation Level: Oriented X4  Cognition: Appropriate decision making; Appropriate for age attention/concentration; Appropriate safety awareness; Follows commands  Perception: Appears intact  Perseveration: No perseveration noted  Safety/Judgement: Awareness of environment; Fall prevention; Insight into deficits (learning about and receptive to safety precautions)    Functional Mobility and Transfers for ADLs:  Bed Mobility:  Rolling: Modified independent  Supine to Sit: Modified independent  Scooting: Modified independent    Transfers:  Sit to Stand: Stand-by assistance  Functional Transfers  Toilet Transfer : Stand-by assistance;Supervision       Balance:  Sitting: Intact  Standing: Impaired; Without support  Standing - Static: Good  Standing - Dynamic : Constant support;Good    ADL Intervention:       Grooming  Grooming Assistance: Stand-by assistance;Set-up                   Lower Body Dressing Assistance  Dressing Assistance: Supervision;Contact guard assistance  Socks: Supervision  Shoes with Velcro: Supervision (receptive to NEED for consistent post op shoe use)  Position Performed: Seated in chair    Toileting  Toileting Assistance: Stand-by assistance    Cognitive Retraining  Attention to Task: Single task  Maintains Attention For (Time): Greater than 10 minutes  Following Commands: Follows one step commands/directions; Follows two step commands/directions  Safety/Judgement: Awareness of environment; Fall prevention; Insight into deficits (learning about and receptive to safety precautions)      Pain:  4/10, R post op 4th toe area; reports throbbing if it hangs down and joshua immediately upon return to bed; trained pain management including meds and elevation    Activity Tolerance:   Good and SpO2 stable on RA    After treatment patient left in no apparent distress:   Sitting in chair, Call bell within reach, and R LE elevated on edge of bed    COMMUNICATION/COLLABORATION:   The patients plan of care was discussed with: Physical therapist, Registered nurse, and Case managementYnes Merino OTR/L  Time Calculation: 22 mins

## 2022-01-04 NOTE — PROGRESS NOTES
Patient is ready for d/c from CM standpoint    Transition of Care Plan:     RUR:12%  Disposition:  Home with [facility] HH-PT,OT&SN   Follow up appointments:  on AVS  DME needed:  r/w-[place]  Transportation at Discharge:  Family will transport home via car  [address] or means to access home:  Dtr has access to home as needed      IM Medicare Letter:  2nd IM provided  Is patient a BCPI-A Bundle:  N/A                    If yes, was Bundle Letter given?:    Is patient a  and connected with the [place]? N/A  If yes, was [place] transfer form completed and [facility] notified? Caregiver Contact:  Kirit Lynch dtr  [phone]  Discharge Caregiver contacted prior to discharge? CM delivered r/w to pt. Patient is d/c home today with [facility]. No other needs or concerns identified. Care Management Interventions  PCP Verified by CM: Yes  Mode of Transport at Discharge: Other (see comment) (Family transport home via car)  Transition of Care Consult (CM Consult): [address]: No  Discharge Durable Medical Equipment: Yes (r/w-[place])  Physical Therapy Consult: Yes  Occupational Therapy Consult: Yes  Support Systems: Child(masha)  Confirm Follow Up Transport: Family  The Plan for Transition of Care is Related to the Following Treatment Goals : Plans are for return to home with Home Health services. Family supportive and to provide additional assistance as needed.   The Patient and/or Patient Representative was Provided with a Choice of Provider and Agrees with the Discharge Plan?: Yes  Name of the Patient Representative Who was Provided with a Choice of Provider and Agrees with the Discharge Plan: Patient selected provider  [place] of Choice List was Provided with Basic Dialogue that Supports the Patient's Individualized Plan of Care/Goals, Treatment Preferences and Shares the Quality Data Associated with the Providers?: Yes  Discharge Location  Discharge Placement: Home with home health      Lilly Dyer Dulce  Ext [phone]

## 2022-01-04 NOTE — DISCHARGE INSTRUCTIONS
Patient Education   Patient Education   Patient Education     Patient Follow Up Instructions: Activity: Activity as tolerated, Partial weight bearing on the heel right with a post op shoe  Diet: DIET ONE TIME MESSAGE  ADULT DIET Regular; 4 carb choices (60 gm/meal)  Wound Care: As directed, Wound care to the right foot with silver cell and gauze dressings 2 times a week  Follow up with Dr Carmelita Oviedo or any wound care for post op care in 2 weeks from discharge  Follow-up with PCP in  1 week. Follow-up tests/labs none        High Blood Pressure: Care Instructions  Overview     It's normal for blood pressure to go up and down throughout the day. But if it stays up, you have high blood pressure. Another name for high blood pressure is hypertension. Despite what a lot of people think, high blood pressure usually doesn't cause headaches or make you feel dizzy or lightheaded. It usually has no symptoms. But it does increase your risk of stroke, heart attack, and other problems. You and your doctor will talk about your risks of these problems based on your blood pressure. Your doctor will give you a goal for your blood pressure. Your goal will be based on your health and your age. Lifestyle changes, such as eating healthy and being active, are always important to help lower blood pressure. You might also take medicine to reach your blood pressure goal.  Follow-up care is a key part of your treatment and safety. Be sure to make and go to all appointments, and call your doctor if you are having problems. It's also a good idea to know your test results and keep a list of the medicines you take. How can you care for yourself at home? Medical treatment  · If you stop taking your medicine, your blood pressure will go back up. You may take one or more types of medicine to lower your blood pressure. Be safe with medicines. Take your medicine exactly as prescribed.  Call your doctor if you think you are having a problem with your medicine. · Talk to your doctor before you start taking aspirin every day. Aspirin can help certain people lower their risk of a heart attack or stroke. But taking aspirin isn't right for everyone, because it can cause serious bleeding. · See your doctor regularly. You may need to see the doctor more often at first or until your blood pressure comes down. · If you are taking blood pressure medicine, talk to your doctor before you take decongestants or anti-inflammatory medicine, such as ibuprofen. Some of these medicines can raise blood pressure. · Learn how to check your blood pressure at home. Lifestyle changes  · Stay at a healthy weight. This is especially important if you put on weight around the waist. Losing even 10 pounds can help you lower your blood pressure. · If your doctor recommends it, get more exercise. Walking is a good choice. Bit by bit, increase the amount you walk every day. Try for at least 30 minutes on most days of the week. You also may want to swim, bike, or do other activities. · Avoid or limit alcohol. Talk to your doctor about whether you can drink any alcohol. · Try to limit how much sodium you eat to less than 2,300 milligrams (mg) a day. Your doctor may ask you to try to eat less than 1,500 mg a day. · Eat plenty of fruits (such as bananas and oranges), vegetables, legumes, whole grains, and low-fat dairy products. · Lower the amount of saturated fat in your diet. Saturated fat is found in animal products such as milk, cheese, and meat. Limiting these foods may help you lose weight and also lower your risk for heart disease. · Do not smoke. Smoking increases your risk for heart attack and stroke. If you need help quitting, talk to your doctor about stop-smoking programs and medicines. These can increase your chances of quitting for good. When should you call for help? Call 911  anytime you think you may need emergency care.  This may mean having symptoms that suggest that your blood pressure is causing a serious heart or blood vessel problem. Your blood pressure may be over 180/120. For example, call 911 if:    · You have symptoms of a heart attack. These may include:  ? Chest pain or pressure, or a strange feeling in the chest.  ? Sweating. ? Shortness of breath. ? Nausea or vomiting. ? Pain, pressure, or a strange feeling in the back, neck, jaw, or upper belly or in one or both shoulders or arms. ? Lightheadedness or sudden weakness. ? A fast or irregular heartbeat.     · You have symptoms of a stroke. These may include:  ? Sudden numbness, tingling, weakness, or loss of movement in your face, arm, or leg, especially on only one side of your body. ? Sudden vision changes. ? Sudden trouble speaking. ? Sudden confusion or trouble understanding simple statements. ? Sudden problems with walking or balance. ? A sudden, severe headache that is different from past headaches.     · You have severe back or belly pain. Do not wait until your blood pressure comes down on its own. Get help right away. Call your doctor now or seek immediate care if:    · Your blood pressure is much higher than normal (such as 180/120 or higher), but you don't have symptoms.     · You think high blood pressure is causing symptoms, such as:  ? Severe headache.  ? Blurry vision. Watch closely for changes in your health, and be sure to contact your doctor if:    · Your blood pressure measures higher than your doctor recommends at least 2 times. That means the top number is higher or the bottom number is higher, or both.     · You think you may be having side effects from your blood pressure medicine. Where can you learn more? Go to http://www.gray.com/  Enter [id number] in the search box to learn more about \"High Blood Pressure: Care Instructions. \"  Current as of: April 29, 2021               Content Version: 13.0  © 3355-8957 [employer], [employer].    Care instructions adapted under license by [employer] (which disclaims liability or warranty for this information). If you have questions about a medical condition or this instruction, always ask your healthcare professional. [address] any warranty or liability for your use of this information. Osteomyelitis: Care Instructions  Your Care Instructions  Osteomyelitis (say \"xw-igdw-rh-oz-sa-MF-tus\") is a bone infection. It is caused by bacteria. The bacteria can infect the bone where it has been injured, or they can be carried through the blood from another area in the body. Osteomyelitis can be a short- or long-term problem. It is treated with antibiotics. You may get the antibiotics as pills or through a needle in a vein (IV). You will probably get treatment in the hospital at first. The type of treatment depends on the type of bacteria causing the infection, the bones affected, and how bad the infection is. Sometimes people need surgery to drain pus from bone or to fix damaged bone. Short-term osteomyelitis that is treated right away usually can be cured. But the long-term form sometimes comes back after treatment. You can help your chances of stopping the infection by taking your medicines as directed. Follow-up care is a key part of your treatment and safety. Be sure to make and go to all appointments, and call your doctor if you are having problems. It's also a good idea to know your test results and keep a list of the medicines you take. How can you care for yourself at home? · Take your antibiotics as directed. Do not stop taking them just because you feel better. You need to take the full course of antibiotics. · Take pain medicines exactly as directed. ? If the doctor gave you a prescription medicine for pain, take it as prescribed. ? If you are not taking a prescription pain medicine, ask your doctor if you can take an over-the-counter medicine.   · Do mild exercise and stretching if your doctor says it is okay. This can help keep your bones and muscles healthy. Avoid strenuous work or exercise until your doctor says you can do it. · Consider physical therapy if your doctor suggests it. Physical therapy may help you have a normal range of movement. · Do not smoke. Smoking can slow healing of the infection. If you need help quitting, talk to your doctor about stop-smoking programs and medicines. These can increase your chances of quitting for good. When should you call for help? Call 911 anytime you think you may need emergency care. For example, call if:    · You have severe bone pain. Call your doctor now or seek immediate medical care if:    · You continue to have bone pain.     · You have signs of infection, such as:  ? Increased pain, swelling, warmth, or redness. ? Red streaks leading from a wound. ? Pus draining from a wound. ? A fever. Watch closely for changes in your health, and be sure to contact your doctor if:    · You do not get better as expected. Where can you learn more? Go to http://www.gray.com/  Enter B364 in the search box to learn more about \"Osteomyelitis: Care Instructions. \"  Current as of: July 6, 2021               Content Version: 13.0  © 2006-2021 [employer]. Care instructions adapted under license by [employer] (which disclaims liability or warranty for this information). If you have questions about a medical condition or this instruction, always ask your healthcare professional. [place] [address] any warranty or liability for your use of this information. Wound Check: Care Instructions  Your Care Instructions  People have wounds that need care for many reasons. You may have a cut that needs care after surgery. You may have a cut or puncture wound from an accident. Or you may have a wound because of a condition like diabetes.   Whatever the cause of your wound, there are things you can do to care for it at home. Your doctor may also want you to come back for a wound check. The wound check lets the doctor know how your wound is healing and if you need more treatment. Follow-up care is a key part of your treatment and safety. Be sure to make and go to all appointments, and call your doctor if you are having problems. It's also a good idea to know your test results and keep a list of the medicines you take. How can you care for yourself at home? · If your doctor told you how to care for your wound, follow your doctor's instructions. If you did not get instructions, follow this general advice:  ? You may cover the wound with a thin layer of petroleum jelly, such as Vaseline, and a nonstick bandage. ? Apply more petroleum jelly and replace the bandage as needed. · Keep the wound dry for the first 24 to 48 hours. After this, you can shower if your doctor okays it. Pat the wound dry. · Be safe with medicines. Read and follow all instructions on the label. ? If the doctor gave you a prescription medicine for pain, take it as prescribed. ? If you are not taking a prescription pain medicine, ask your doctor if you can take an over-the-counter medicine. · If your doctor prescribed antibiotics, take them as directed. Do not stop taking them just because you feel better. You need to take the full course of antibiotics. · If you have stitches, do not remove them on your own. Your doctor will tell you when to come back to have them removed. · If you have Steri-Strips, leave them on until they fall off. · If possible, prop up the injured area on a pillow anytime you sit or lie down during the next 3 days. Try to keep it above the level of your heart. This will help reduce swelling. When should you call for help?    Call your doctor now or seek immediate medical care if:    · You have new pain, or the pain gets worse.     · The skin near the wound is cold or pale or changes color.     · You have tingling, weakness, or numbness near the wound.     · The wound starts to bleed, and blood soaks through the bandage. Oozing small amounts of blood is normal.     · You have symptoms of infection, such as:  ? Increased pain, swelling, warmth, or redness. ? Red streaks leading from the wound. ? Pus draining from the wound. ? A fever. Watch closely for changes in your health, and be sure to contact your doctor if:    · You do not get better as expected. Where can you learn more? Go to http://www.gray.com/  Enter P342 in the search box to learn more about \"Wound Check: Care Instructions. \"  Current as of: July 1, 2021               Content Version: 13.0  © 2006-2021 [employer] Care instructions adapted under license by [employer] (which disclaims liability or warranty for this information). If you have questions about a medical condition or this instruction, always ask your healthcare professional. [address] any warranty or liability for your use of this information.

## 2022-01-04 NOTE — PROGRESS NOTES
Reviewed path report  Margins are clear    4th toe, right, amputation:        Ischemic necrosis   Bone with focal neutrophilic inflammation compatible with osteomyelitis   Margins are viable and uninvolved    Patient can be discharged when medically stable with following recommendations and instructions     1. Can be discharged home with oral prophylactic abx coverage for 10 days with Doxycycline  2. Wound care to the right foot with silver cell and gauze dressings 2 times a week  3. Follow up with my office or any wound care for post op care in 2 weeks from discharge  4. Partial weight bearing on the heel right with a post op shoe     If need any further assistance needed from me please reach out to me on perfect serve.

## 2022-01-04 NOTE — DISCHARGE SUMMARY
Hospitalist Discharge Summary     Patient ID:  Vikas Merchant  [id number]  54 y.o.  1940 12/27/2021    PCP on record: Erick Cooley MD    Admit date: 12/27/2021  Discharge date and time: 1/4/2022    DISCHARGE DIAGNOSIS:  Osteomyelitis of right foot  Hypertension   PVD,  left MADDISON atherectomy and BAP on 9/21/2021   CAD, s/p remote stent  S/p LLE  S/p CEA  CKD stage III  Diabetes    CONSULTATIONS:  IP CONSULT TO PODIATRY  IP CONSULT TO VASCULAR SURGERY    Excerpted HPI from H&P of Renae Harden MD:  Rasta Burgos is a 80 y.o. WHITE/NON- male with medical history including but not limited to hypertension, diabetes, carotid artery stenosis, liver disease, coronary artery disease, arthritis presents to the emergency department with 5-day history of gradual Darryl deteriorating right toe pain. Patient states erythema to right fourth and fifth started 2 days before [place] and it has gradually worsened and pain has become intolerable which prompted him to present to the emergency department.     MRI of right foot was done and preliminary report state Marrow edema in the proximal and middle phalanx of the fourth toe with enhancement and overlying soft tissue swelling concerning for acute osteomyelitis. No clear osteomyelitis of the fifth toe and no soft tissue  abscess     ____________________________________________________________________  DISCHARGE SUMMARY/HOSPITAL COURSE:  for full details see H&P, daily progress notes, labs, consult notes. Osteomyelitis of right foot  Start cefepime, vancomycin, and Flagyl IV, OR pathology negative, d/w Podiatrist, will DC on doxycycline for 10 days. Continue wound care and f/u with podiatry in 2 weeks, partial weight bearing with post op shoe. Pain management   Blood cultures neg so far  Got ortho boot  S/p amputation, had wound cultures after Sx?  Cx + for Staph/yeast     Hypertension   PVD,  left MADDISON atherectomy and BAP on 9/21/2021   CAD, s/p remote stent  S/p LLE  S/p CEA  STELLA right resting STELLA is mildly decreased. Right TBI is 0.26  Rt LE angio as per Vascular as outpt  Hold norvasc/HCTZ/Lisinopril given soft BP  Atenolol with holding parameters      Diabetes  POC before meals and at bedtime blood glucose monitoring with sliding scale coverage  Holding oral antihypoglycemics  Cont Lantus   hemoglobin A1c 11.8     CKD stage III  Renal function close to baseline  Avoid nephrotoxic meds      _______________________________________________________________________  Patient seen and examined by me on discharge day. Pertinent Findings:  General:          No distress     HEENT:           Atraumatic, anicteric sclerae, pink conjunctivae, MMM  Neck:               Supple, symmetrical  Lungs:             CTA. No Wheezing/Rhonchi. No rales. No tenderness. No Accessory muscle use. Heart:              Regular rhythm. No murmur. No JVD. Non palpable DP pulses. GI/:             Soft. NT. ND. BS normal  Extremities:     No edema. No cyanosis. No clubbing. Skin:                Not pale. Not Jaundiced. R foot covered by dressing   Psych:             Good insight. Not depressed. Not anxious or agitated. Neurologic:      Alert and oriented X 4. EOMs intact. No facial asymmetry. No slurred speech. Symmetrical strength, Sensation grossly intact.   _______________________________________________________________________  DISCHARGE MEDICATIONS:   Current Discharge Medication List      START taking these medications    Details   doxycycline (MONODOX) 100 mg capsule Take 1 Capsule by mouth two (2) times a day for 10 days. Qty: 20 Capsule, Refills: 0  Start date: 1/4/2022, End date: 1/14/2022         CONTINUE these medications which have CHANGED    Details   atenoloL (TENORMIN) 50 mg tablet Take 1 Tablet by mouth daily for 30 days.   Qty: 30 Tablet, Refills: 1  Start date: 1/4/2022, End date: 2/3/2022      glipiZIDE (GLUCOTROL) 10 mg tablet Take 1 Tablet by mouth two (2) times a day for 30 days. Qty: 60 Tablet, Refills: 1  Start date: 1/4/2022, End date: 2/3/2022         CONTINUE these medications which have NOT CHANGED    Details   gabapentin (NEURONTIN) 600 mg tablet Take 600 mg by mouth two (2) times a day. finasteride (PROSCAR) 5 mg tablet Take 5 mg by mouth daily. tamsulosin (FLOMAX) 0.4 mg capsule Take 0.4 mg by mouth nightly. cholecalciferol (Vitamin D3) 25 mcg (1,000 unit) cap Take 1,000 Units by mouth daily. insulin glargine (Lantus Solostar U-100 Insulin) 100 unit/mL (3 mL) inpn 15 Units by SubCUTAneous route daily. methocarbamoL (Robaxin-750) 750 mg tablet Take 1 Tab by mouth four (4) times daily as needed for Muscle Spasm(s). Qty: 20 Tab, Refills: 0      aspirin delayed-release 81 mg tablet Take 81 mg by mouth daily. multivitamin (ONE A DAY) tablet Take 1 Tab by mouth daily. lovastatin (MEVACOR) 10 mg tablet Take 40 mg by mouth nightly. STOP taking these medications       hydroCHLOROthiazide (HYDRODIURIL) 25 mg tablet Comments:   Reason for Stopping:         amLODIPine (NORVASC) 5 mg tablet Comments:   Reason for Stopping:         levoFLOXacin (LEVAQUIN) 750 mg tablet Comments:   Reason for Stopping:         clindamycin (CLEOCIN) 300 mg capsule Comments:   Reason for Stopping:         lisinopril (PRINIVIL, ZESTRIL) 40 mg tablet Comments:   Reason for Stopping:         metformin (GLUCOPHAGE) 1,000 mg tablet Comments:   Reason for Stopping:                 Patient Follow Up Instructions: Activity: Activity as tolerated w partial weight bearing and post op shoe  Diet: DIET ONE TIME MESSAGE  ADULT DIET Regular; 4 carb choices (60 gm/meal)  Wound Care: As directed  Follow-up with PCP in  1 week. Follow-up tests/labs none       Follow-up Information     Follow up With Specialties Details Why [facility] RN/PT.   Call [place] if you do not hear from them within 24 hours of discharge. [facility] [phone]     Garrett Rubinstein, MD Vascular Surgery  We will contact you with your appointment for your arteriogram.  Nothing to eat or drink after midnight on the night prior to your procedure.   [facility]  [phone]      Tre Schmidt MD [address]  [phone]          ________________________________________________________________    Risk of deterioration: Low    Condition at Discharge:  Stable  __________________________________________________________________    Disposition  Home with family, no needs    ____________________________________________________________________    Code Status: Full Code  ___________________________________________________________________      Total time in minutes spent coordinating this discharge (includes going over instructions, follow-up, prescriptions, and preparing report for sign off to her PCP) :  >30 minutes    Signed:  Josse Beckford MD

## 2022-01-04 NOTE — PROGRESS NOTES
Pharmacy Antimicrobial Kinetic Dosing    Indication for Antimicrobials: Osteomyelitis     Current Regimen of Each Antimicrobial:  Vancomycin 1000 mg q24h (Start Date ; Day 9)  Cefepime 2 g IV q12h (Start Date ; Day 9)  Metronidazole 500 mg IV q12h (Start Date ; Day 9)    Previous Antimicrobial Therapy:    Goal Level: AUC: 400-600 mg/hr/Liter/day    Date Dose & Interval Measured (mcg/mL) Predicted AUC/PETRONA    2348 1g q24h 15.8 578    2248 1g q24h 12.4 405   1/3 2340 1250mg q24h 18.4 628     Date & time of next level:     Dosing calculator used: Visualnest calculator    Significant Positive Cultures:    blood: ng (final)   wound: e faecalis (amp S)   anaerobe - ng   anaerobe - c glabrata    wound - coag neg staph in broth only (final)    Conditions for Dosing Consideration: None    Labs:  Recent Labs     22  0516 22  0201   CREA 1.58* 1.87*   BUN 38* 41*     Recent Labs     22  0516   WBC 5.1       Temp (24hrs), Av.9 °F (36.6 °C), Min:97.5 °F (36.4 °C), Max:98.6 °F (37 °C)    Creatinine Clearance (mL/min):   CrCl (Ideal Body Weight): 35.5   If actual weight < IBW: CrCl (Actual Body Weight) 43.2    Impression/Plan:   Continue cefepime 2g q12h for CrCl between 30-60 mL/min  Continue metronidazole 500 mg q12h   CKD with Scr ~1.9 at baseline. Scr fluctuating daily. Estimated AUC at this time is > 600. Will adjust vancomycin back to 1g IV q24h. Estimated AUC on this regimen 603-160 with fluctuating Scr   BMP daily  Antimicrobial stop date TBD     Pharmacy will follow daily and adjust medications as appropriate for renal function and/or serum levels.     Thank you,  Krissy Carrillo, PHARMD    Vancomycin Dosing Document    Documents located on pharmacy Teams site: Clinical Practice -> Antimicrobial Stewardship -> Antibiotics_Vancomycin     Aminoglycoside Dosing Document    Documents located on pharmacy Teams site: Clinical Practice -> Antimicrobial Stewardship -> Antibiotics_Aminoglycosides

## 2022-01-05 ENCOUNTER — PATIENT OUTREACH (OUTPATIENT)
Dept: CASE MANAGEMENT | Age: 82
End: 2022-01-05

## 2022-01-05 RX ORDER — CLOPIDOGREL BISULFATE 75 MG/1
TABLET ORAL
COMMUNITY

## 2022-01-05 RX ORDER — ROSUVASTATIN CALCIUM 20 MG/1
20 TABLET, COATED ORAL
COMMUNITY

## 2022-01-05 NOTE — OP NOTES
[address]  OPERATIVE REPORT    Name:  Madonna Ring  MR#:  [id number]  :  1940  ACCOUNT #:  [de-identified]  DATE OF SERVICE:  2021    PREOPERATIVE DIAGNOSIS:  Osteomyelitis, right fourth toe. POSTOPERATIVE DIAGNOSIS:  Osteomyelitis, right fourth toe. PROCEDURE PERFORMED:  Amputation, right fourth toe to the metatarsophalangeal joint level. SURGEON:  Coral Haile DPM    ASSISTANT:  None. ANESTHESIA:  MAC.    COMPLICATIONS:  None. SPECIMENS REMOVED:  Removed toe was sent to pathology. IMPLANTS:  None. ESTIMATED BLOOD LOSS:  Minimal.    DRAINS:  None. PROCEDURE:  The patient was brought into the OR and left on the patient's bed in the supine position. The IV sedation was performed as per CRNA. The ankle tourniquet was placed with Webril padding and local infiltration of 0.5% Marcaine plain was injected. The right foot was prepped and draped via usual sterile manner. After Anesthesia check, an oval incision was made over the fourth toe base at the proximal phalanx with an extension to the dorsal aspect to the metatarsophalangeal joint. Using #15 sterile blade, the incision was made deep into the bone and carried down to the metatarsophalangeal joint level dislocating the toe at the MPJ and the entire toe with the distal soft tissue was sent for pathology and removed completely and removed from the operative site. Deep cultures were taken at this point. The area was then copiously irrigated with Irrisept and followed by normal saline. The skin was closed with 2-0 nylon sutures and tourniquet was deflated noting hyperemia and a mild compressive bandage was applied, which controlled the hemorrhage, and secured with Ace wrap. The patient tolerated the procedure and anesthesia well without any complications and then left the OR with vital signs stable. The patient was sent back to medical floor for medical management.         Ines Puente DPM      [id number]  D:  01/04/2022 14:08  T:  01/04/2022 19:38  JOB #:  [id number]

## 2022-01-05 NOTE — PROGRESS NOTES
Care Transitions Initial Call    Call within 2 business days of discharge: Yes     Patient: Kaleb Romo Patient : 1940 MRN: [id number]    [facility]       Complaint Diagnosis Description Type Department Provider    21 Toe Pain; Foot Pain Osteomyelitis of right foot, unspecified type ([facility] [address] . .. ED to Hosp-Admission (Discharged) (ADMIT) Cheryl Cantu MD; Anita Sanchez,... Was this an external facility discharge? No    Challenges to be reviewed by the provider   Additional needs identified to be addressed with provider: yes    Component      Latest Ref Rng & Units 2022           5:16 AM   BUN      6 - 20 MG/DL 38 (H)   Creatinine      0.70 - 1.30 MG/DL 1.58 (H)   BUN/Creatinine ratio      12 - 20   24 (H)   GFR est AA      >60 ml/min/1.73m2 51 (L)   GFR est non-AA      >60 ml/min/1.73m2 42 (L)     Component      Latest Ref Rng & Units 2021           3:34 AM   Hemoglobin A1c, (calculated)      4.0 - 5.6 % 11.8 (H)   Pt.reports difficulty using Insulin pens, no longer taking Lantus, metFORMIN 1,000 mg tablet (GLUCOPHAGE) discontinued when d/c from hospital. (dtr.discuss with Endocrinologist 22). Method of communication with provider :     Discussed COVID-19 related testing which was available at this time. Test results were negative. Patient informed of results, if available? yes     Advance Care Planning:   Does patient have an Advance Directive:  [address] on file    Inpatient Readmission Risk score: Unplanned Readmit Risk Score: 11 ( )    Was this a readmission?  no   Patient stated reason for the admission: foot pain, redness and swelling toes    Patients top risk factors for readmission: medical condition-wounds   Interventions to address risk factors: Scheduled appointment with PCP-21, Scheduled appointment with Specialist-22 and Obtained and reviewed discharge summary and/or continuity of care documents    Care Transition Nurse (CTN) contacted the patient by telephone to perform post hospital discharge assessment. Verified name and  with patient as identifiers. Provided introduction to self, and explanation of the CTN role. CTN reviewed discharge instructions, medical action plan and red flags with patient who verbalized understanding. Were discharge instructions available to patient? yes. Reviewed appropriate site of care based on symptoms and resources available to patient including: PCP, Specialist, When to call 911 and [facility]. Patient given an opportunity to ask questions and does not have any further questions or concerns at this time. The patient agrees to contact the PCP office for questions related to their healthcare. Medication reconciliation was performed with patient, dtr. Claudeen Spar                who verbalizes understanding of administration of home medications. Advised obtaining a 90-day supply of all daily and as-needed medications. Referral to Pharm D needed: no   START taking:  doxycycline (MONODOX) 100 mg  -Take 1 Capsule by mouth two (2) times a day for 10 days    CHANGE how you take:  atenoloL (TENORMIN) 50 mg tablet - Take 1 Tablet by mouth daily for 30 days. STOP taking:  amLODIPine 5 mg tablet (NORVASC)  clindamycin 300 mg capsule (CLEOCIN)  hydroCHLOROthiazide 25 mg tablet (HYDRODIURIL)  levoFLOXacin 750 mg tablet (LEVAQUIN)  lisinopriL 40 mg tablet (PRINIVIL, ZESTRIL)  metFORMIN 1,000 mg tablet (GLUCOPHAGE)    Home Health/Outpatient orders at discharge: home health care, PT, OT and [address]: [facility]  Date of initial visit: 22     Covid Risk Education    Decline education  COVID-19 and  CDC guidelines. CTN reviewed discharge instructions, medical action plan and red flag symptoms with the patient who verbalized understanding. Discussed COVID vaccination status: yes, vaccinated.  Patient was given an opportunity to verbalize any questions and concerns and agrees to contact CTN or health care provider for questions related to their healthcare. Discussed follow-up appointments: yes. Is follow up appointment scheduled within 7 days of discharge? No, appt. 1/12/22, pt.decline assist.earlier appt. [facility] follow up appointment(s): No future appointments. Plan for follow-up call in 5-7 days based on severity of symptoms and risk factors. Plan for next call: medication management-DM  CTN provided contact information for future needs. Goals      Develop action plan for Self-Management Chronic Disease. 1/5/22 Pt.reports not checking FBS, not adhering to diabetic diet, eats sweets as desired. Discussed diet, hypoglycemia/hyperglycemia and treatment, pt.encouraged to monitor s/s hyper/hypoglycemia;  pt will contact office report low reading <80 or high blood sugar reading >250. Pt.will check FBS prior to administering insulin/meds, Pt will notify physician of consistent Hyper/Hypoglycemia readings, keep a list of medications and FBS readings for PCP to review; report any changes to plan of care at next outreach in 5-7 days. -[address] [facility] PCP relationships and regularly scheduled appointments. 1/5/22  Pt.will attend all recommended follow ups with pcp and specialist w/n 30 day period. Pt.scheduled hosp.follow up:  (PCP) JOHNNY Shukla MD (dtr.will schedule)  (Endocrinologist) 1/12/22  (Vascular Surg). Estevan Osullivan MD 1/17/22  CTN provided pt. information [employer] [phone]) explain briefly type of service; contact information provided, f/u with pt.in 5-7 days. -[address]

## 2022-01-14 ENCOUNTER — PATIENT OUTREACH (OUTPATIENT)
Dept: CASE MANAGEMENT | Age: 82
End: 2022-01-14

## 2022-01-14 NOTE — PROGRESS NOTES
Care Transitions Outreach Attempt    Call within 2 business days of discharge: Yes   Attempted to reach patient for transitions of care follow up. Unable to reach patient. Patient: Sae Cuba Sr. Patient : 1940 MRN: [id number]    Last Discharge [facility]       Complaint Diagnosis Description Type Department Provider    21 Toe Pain; Foot Pain [facility], [facility] . .. ED to Hosp-Admission (Discharged) (ADMIT) David Henriquez MD; Sue Hawthorne...

## 2022-01-14 NOTE — PROGRESS NOTES
Follow Up Call    Challenges to be reviewed by the provider   Additional needs identified to be addressed with provider: no         Encounter was not routed to provider for escalation. Method of communication with provider: none. Contacted the patient by telephone to follow up after hospital visit. Status: improved  Interventions to address identified needs: Scheduled appointment with PCP-1/12/22 and Scheduled appointment with [address] [place] [address] PT/OT/SN [address] follow up appointment(s): No future appointments. Non-[facility] follow up appointment(s):    Follow up appointment completed? yes. Provided contact information for future needs. Plan for follow-up call in 10-14 days based on severity of symptoms and risk factors.   Plan for next call: follow up appointment-specialist Mathew Ann 1/19/22     Kadie Rodas RN

## 2022-01-28 ENCOUNTER — PATIENT OUTREACH (OUTPATIENT)
Dept: CASE MANAGEMENT | Age: 82
End: 2022-01-28

## 2022-01-28 NOTE — PROGRESS NOTES
Care Transitions Outreach Attempt     Attempted to reach patient for transitions of care follow up. Unable to reach patient. Patient: Serafin Gaytan Sr. Patient : 1940 MRN: [id number]    Last Discharge [facility]       Complaint Diagnosis Description Type Department Provider    21 Toe Pain; Foot Pain Osteomyelitis of right foot, unspecified type ([facility] [address] . .. ED to Hosp-Admission (Discharged) (ADMIT) Chiqui Bella MD; Rufino Polo,... [facility] follow up appointment(s): No future appointments. Nutrition Services Progress Note    Physician: Dr. Justin  Diagnosis: Esophageal cancer  Treatment:  Carbo/Taxol + RT (@[facility]) s/p MODIFIED DCF (DOCETAXEL/OXALIPLATIN/5-FU)    Food and Nutrition Related History:  Spoke with Jose over the phone to follow up. He has started Carbo/Taxol (today is C1D16) and RT. Noted anorexia in RN progress note but Jose states he is eating and having no problems but just eating less. He said he had one meal with salad that caused some tenderness in area being radiated. Reinforced good nutrition/hydration.   Provided support and encouragement.     Anthropometric Measurements:  Estimated body mass index is 55.27 kg/m² as calculated from the following:    Height as of 3/26/20: 6' 0.28\" (1.836 m).    Weight as of 4/9/20: 186.3 kg.    Wt Readings from Last 5 Encounters:   04/09/20 (!) 186.3 kg   04/02/20 (!) 189.7 kg   03/26/20 (!) 190.5 kg   03/17/20 (!) 185.7 kg   03/03/20 (!) 189.9 kg     Usual Weight: 206 kg  Weight Change:  -3.4 kg x 1 week, weight fluctuates based on edema (2%)    Biochemical Data, Medical Tests, and Procedures:  --     Nutrition-Focused Physical Findings:  Overall appearance: Unable to assess over the phone    Comparative Standards:  Estimated energy needs -  Total energy estimated needs (CS-1.1.1): 2000 kcals, 100-125 g protein    Nutrition Diagnosis:  Increased nutrient needs  related to malignancy and other co-morbities as evidenced by increased daily protein and micronutrient needs to prevent malnutrition and mitigate side effects of cancer treatment.        Intervention:  Modified diet:    High protein diet. Recommend good variety of nutritious foods. Recommend to limit sodium to 1500 mg/day to manage edema. Also limit greasy and hard to digest foods and try to eat blander foods.  >64 oz caffeine free, low calorie beverages.   Nutrition relationship to health/disease:   Referred to previously provided recipes and handouts for good sources of protein and  foods that are appropriate and may be better tolerated.       Monitoring and Evaluation:  Weight:   Maintenance (meeting)  Amount of food:  PO > 75% EEE with good tolerance (progressing to goal)  Magnesium (4):  Met now WNL

## 2022-01-31 LAB
BACTERIA SPEC CULT: NORMAL
SERVICE CMNT-IMP: NORMAL

## 2022-03-19 PROBLEM — M86.9 OSTEOMYELITIS OF RIGHT FOOT (HCC): Status: ACTIVE | Noted: 2021-12-28

## 2022-05-06 ENCOUNTER — APPOINTMENT (OUTPATIENT)
Dept: CT IMAGING | Age: 82
DRG: 179 | End: 2022-05-06
Attending: EMERGENCY MEDICINE
Payer: MEDICARE

## 2022-05-06 ENCOUNTER — HOSPITAL ENCOUNTER (INPATIENT)
Age: 82
LOS: 1 days | Discharge: HOME | DRG: 179 | End: 2022-05-08
Attending: EMERGENCY MEDICINE | Admitting: INTERNAL MEDICINE
Payer: MEDICARE

## 2022-05-06 DIAGNOSIS — R29.90 STROKE-LIKE SYMPTOMS: Primary | ICD-10-CM

## 2022-05-06 PROBLEM — R53.1 WEAKNESS: Status: ACTIVE | Noted: 2022-05-06

## 2022-05-06 PROBLEM — R47.81 SLURRED SPEECH: Status: ACTIVE | Noted: 2022-05-06

## 2022-05-06 LAB
25(OH)D3 SERPL-MCNC: 36.8 NG/ML (ref 30–100)
ALBUMIN SERPL-MCNC: 3.6 G/DL (ref 3.5–5)
ALBUMIN/GLOB SERPL: 0.9 {RATIO} (ref 1.1–2.2)
ALP SERPL-CCNC: 56 U/L (ref 45–117)
ALT SERPL-CCNC: 30 U/L (ref 12–78)
ANION GAP SERPL CALC-SCNC: 6 MMOL/L (ref 5–15)
APPEARANCE UR: CLEAR
AST SERPL-CCNC: 31 U/L (ref 15–37)
ATRIAL RATE: 115 BPM
BACTERIA URNS QL MICRO: NEGATIVE /HPF
BASOPHILS # BLD: 0 K/UL (ref 0–0.1)
BASOPHILS NFR BLD: 0 % (ref 0–1)
BILIRUB SERPL-MCNC: 0.8 MG/DL (ref 0.2–1)
BILIRUB UR QL: NEGATIVE
BUN SERPL-MCNC: 41 MG/DL (ref 6–20)
BUN/CREAT SERPL: 22 (ref 12–20)
CALCIUM SERPL-MCNC: 8.9 MG/DL (ref 8.5–10.1)
CALCULATED P AXIS, ECG09: 59 DEGREES
CALCULATED R AXIS, ECG10: -52 DEGREES
CALCULATED T AXIS, ECG11: 81 DEGREES
CHLORIDE SERPL-SCNC: 108 MMOL/L (ref 97–108)
CHOLEST SERPL-MCNC: 116 MG/DL
CO2 SERPL-SCNC: 23 MMOL/L (ref 21–32)
COLOR UR: ABNORMAL
COMMENT, HOLDF: NORMAL
COVID-19 RAPID TEST, COVR: DETECTED
CREAT SERPL-MCNC: 1.87 MG/DL (ref 0.7–1.3)
CRP SERPL-MCNC: 1.8 MG/DL (ref 0–0.6)
CRP SERPL-MCNC: 3.31 MG/DL (ref 0–0.6)
D DIMER PPP FEU-MCNC: 1.35 MG/L FEU (ref 0–0.65)
DIAGNOSIS, 93000: NORMAL
DIFFERENTIAL METHOD BLD: ABNORMAL
EOSINOPHIL # BLD: 0 K/UL (ref 0–0.4)
EOSINOPHIL NFR BLD: 0 % (ref 0–7)
EPITH CASTS URNS QL MICRO: ABNORMAL /LPF
ERYTHROCYTE [DISTWIDTH] IN BLOOD BY AUTOMATED COUNT: 13 % (ref 11.5–14.5)
EST. AVERAGE GLUCOSE BLD GHB EST-MCNC: 117 MG/DL
GLOBULIN SER CALC-MCNC: 4.2 G/DL (ref 2–4)
GLUCOSE BLD STRIP.AUTO-MCNC: 140 MG/DL (ref 65–117)
GLUCOSE BLD STRIP.AUTO-MCNC: 63 MG/DL (ref 65–117)
GLUCOSE SERPL-MCNC: 61 MG/DL (ref 65–100)
GLUCOSE UR STRIP.AUTO-MCNC: 500 MG/DL
HBA1C MFR BLD: 5.7 % (ref 4–5.6)
HCT VFR BLD AUTO: 39.2 % (ref 36.6–50.3)
HDLC SERPL-MCNC: 48 MG/DL
HDLC SERPL: 2.4 {RATIO} (ref 0–5)
HGB BLD-MCNC: 13.3 G/DL (ref 12.1–17)
HGB UR QL STRIP: ABNORMAL
HYALINE CASTS URNS QL MICRO: ABNORMAL /LPF (ref 0–5)
IMM GRANULOCYTES # BLD AUTO: 0 K/UL (ref 0–0.04)
IMM GRANULOCYTES NFR BLD AUTO: 0 % (ref 0–0.5)
INR PPP: 1.1 (ref 0.9–1.1)
KETONES UR QL STRIP.AUTO: NEGATIVE MG/DL
LDLC SERPL CALC-MCNC: 39.2 MG/DL (ref 0–100)
LEUKOCYTE ESTERASE UR QL STRIP.AUTO: NEGATIVE
LYMPHOCYTES # BLD: 0.6 K/UL (ref 0.8–3.5)
LYMPHOCYTES NFR BLD: 7 % (ref 12–49)
MCH RBC QN AUTO: 31.2 PG (ref 26–34)
MCHC RBC AUTO-ENTMCNC: 33.9 G/DL (ref 30–36.5)
MCV RBC AUTO: 92 FL (ref 80–99)
MONOCYTES # BLD: 0.7 K/UL (ref 0–1)
MONOCYTES NFR BLD: 8 % (ref 5–13)
NEUTS SEG # BLD: 7.5 K/UL (ref 1.8–8)
NEUTS SEG NFR BLD: 85 % (ref 32–75)
NITRITE UR QL STRIP.AUTO: NEGATIVE
NRBC # BLD: 0 K/UL (ref 0–0.01)
NRBC BLD-RTO: 0 PER 100 WBC
P-R INTERVAL, ECG05: 150 MS
PH UR STRIP: 5 [PH] (ref 5–8)
PLATELET # BLD AUTO: 106 K/UL (ref 150–400)
PMV BLD AUTO: 10.6 FL (ref 8.9–12.9)
POTASSIUM SERPL-SCNC: 4.8 MMOL/L (ref 3.5–5.1)
PROCALCITONIN SERPL-MCNC: 0.13 NG/ML
PROCALCITONIN SERPL-MCNC: 0.14 NG/ML
PROT SERPL-MCNC: 7.8 G/DL (ref 6.4–8.2)
PROT UR STRIP-MCNC: 30 MG/DL
PROTHROMBIN TIME: 11.1 SEC (ref 9–11.1)
Q-T INTERVAL, ECG07: 316 MS
QRS DURATION, ECG06: 106 MS
QTC CALCULATION (BEZET), ECG08: 437 MS
RBC # BLD AUTO: 4.26 M/UL (ref 4.1–5.7)
RBC #/AREA URNS HPF: ABNORMAL /HPF (ref 0–5)
RBC MORPH BLD: ABNORMAL
SAMPLES BEING HELD,HOLD: NORMAL
SERVICE CMNT-IMP: ABNORMAL
SERVICE CMNT-IMP: ABNORMAL
SODIUM SERPL-SCNC: 137 MMOL/L (ref 136–145)
SOURCE, COVRS: ABNORMAL
SP GR UR REFRACTOMETRY: 1.01 (ref 1–1.03)
TRIGL SERPL-MCNC: 144 MG/DL (ref ?–150)
TROPONIN-HIGH SENSITIVITY: 30 NG/L (ref 0–76)
TSH SERPL DL<=0.05 MIU/L-ACNC: 0.75 UIU/ML (ref 0.36–3.74)
UROBILINOGEN UR QL STRIP.AUTO: 0.2 EU/DL (ref 0.2–1)
VENTRICULAR RATE, ECG03: 115 BPM
VLDLC SERPL CALC-MCNC: 28.8 MG/DL
WBC # BLD AUTO: 8.8 K/UL (ref 4.1–11.1)
WBC URNS QL MICRO: ABNORMAL /HPF (ref 0–4)

## 2022-05-06 PROCEDURE — 97530 THERAPEUTIC ACTIVITIES: CPT

## 2022-05-06 PROCEDURE — 99285 EMERGENCY DEPT VISIT HI MDM: CPT

## 2022-05-06 PROCEDURE — G0378 HOSPITAL OBSERVATION PER HR: HCPCS

## 2022-05-06 PROCEDURE — 82306 VITAMIN D 25 HYDROXY: CPT

## 2022-05-06 PROCEDURE — 70496 CT ANGIOGRAPHY HEAD: CPT

## 2022-05-06 PROCEDURE — 97165 OT EVAL LOW COMPLEX 30 MIN: CPT

## 2022-05-06 PROCEDURE — 80061 LIPID PANEL: CPT

## 2022-05-06 PROCEDURE — 86140 C-REACTIVE PROTEIN: CPT

## 2022-05-06 PROCEDURE — 87635 SARS-COV-2 COVID-19 AMP PRB: CPT

## 2022-05-06 PROCEDURE — 84484 ASSAY OF TROPONIN QUANT: CPT

## 2022-05-06 PROCEDURE — 74011250637 HC RX REV CODE- 250/637: Performed by: FAMILY MEDICINE

## 2022-05-06 PROCEDURE — 74011250637 HC RX REV CODE- 250/637: Performed by: NURSE PRACTITIONER

## 2022-05-06 PROCEDURE — 96374 THER/PROPH/DIAG INJ IV PUSH: CPT

## 2022-05-06 PROCEDURE — 97161 PT EVAL LOW COMPLEX 20 MIN: CPT

## 2022-05-06 PROCEDURE — 85025 COMPLETE CBC W/AUTO DIFF WBC: CPT

## 2022-05-06 PROCEDURE — 99223 1ST HOSP IP/OBS HIGH 75: CPT | Performed by: PSYCHIATRY & NEUROLOGY

## 2022-05-06 PROCEDURE — 84145 PROCALCITONIN (PCT): CPT

## 2022-05-06 PROCEDURE — 81001 URINALYSIS AUTO W/SCOPE: CPT

## 2022-05-06 PROCEDURE — 70450 CT HEAD/BRAIN W/O DYE: CPT

## 2022-05-06 PROCEDURE — 80053 COMPREHEN METABOLIC PANEL: CPT

## 2022-05-06 PROCEDURE — 83036 HEMOGLOBIN GLYCOSYLATED A1C: CPT

## 2022-05-06 PROCEDURE — 4A03X5D MEASUREMENT OF ARTERIAL FLOW, INTRACRANIAL, EXTERNAL APPROACH: ICD-10-PCS | Performed by: EMERGENCY MEDICINE

## 2022-05-06 PROCEDURE — 84443 ASSAY THYROID STIM HORMONE: CPT

## 2022-05-06 PROCEDURE — 95816 EEG AWAKE AND DROWSY: CPT | Performed by: PSYCHIATRY & NEUROLOGY

## 2022-05-06 PROCEDURE — 92610 EVALUATE SWALLOWING FUNCTION: CPT

## 2022-05-06 PROCEDURE — 74011000636 HC RX REV CODE- 636: Performed by: RADIOLOGY

## 2022-05-06 PROCEDURE — 85610 PROTHROMBIN TIME: CPT

## 2022-05-06 PROCEDURE — 97535 SELF CARE MNGMENT TRAINING: CPT

## 2022-05-06 PROCEDURE — 74011000250 HC RX REV CODE- 250: Performed by: EMERGENCY MEDICINE

## 2022-05-06 PROCEDURE — 94760 N-INVAS EAR/PLS OXIMETRY 1: CPT

## 2022-05-06 PROCEDURE — 93005 ELECTROCARDIOGRAM TRACING: CPT

## 2022-05-06 PROCEDURE — 36415 COLL VENOUS BLD VENIPUNCTURE: CPT

## 2022-05-06 PROCEDURE — 82962 GLUCOSE BLOOD TEST: CPT

## 2022-05-06 PROCEDURE — 85379 FIBRIN DEGRADATION QUANT: CPT

## 2022-05-06 PROCEDURE — 0042T CT CODE NEURO PERF W CBF: CPT

## 2022-05-06 RX ORDER — TAMSULOSIN HYDROCHLORIDE 0.4 MG/1
0.4 CAPSULE ORAL
Status: DISCONTINUED | OUTPATIENT
Start: 2022-05-06 | End: 2022-05-08 | Stop reason: HOSPADM

## 2022-05-06 RX ORDER — ACETAMINOPHEN 650 MG/1
650 SUPPOSITORY RECTAL
Status: DISCONTINUED | OUTPATIENT
Start: 2022-05-06 | End: 2022-05-08 | Stop reason: HOSPADM

## 2022-05-06 RX ORDER — CLOPIDOGREL BISULFATE 75 MG/1
75 TABLET ORAL DAILY
Status: DISCONTINUED | OUTPATIENT
Start: 2022-05-06 | End: 2022-05-08 | Stop reason: HOSPADM

## 2022-05-06 RX ORDER — ROSUVASTATIN CALCIUM 10 MG/1
20 TABLET, COATED ORAL
Status: DISCONTINUED | OUTPATIENT
Start: 2022-05-06 | End: 2022-05-07

## 2022-05-06 RX ORDER — FINASTERIDE 5 MG/1
5 TABLET, FILM COATED ORAL DAILY
Status: DISCONTINUED | OUTPATIENT
Start: 2022-05-06 | End: 2022-05-08 | Stop reason: HOSPADM

## 2022-05-06 RX ORDER — GUAIFENESIN 100 MG/5ML
81 LIQUID (ML) ORAL DAILY
Status: DISCONTINUED | OUTPATIENT
Start: 2022-05-06 | End: 2022-05-08 | Stop reason: HOSPADM

## 2022-05-06 RX ORDER — LISINOPRIL 10 MG/1
10 TABLET ORAL DAILY
Status: DISCONTINUED | OUTPATIENT
Start: 2022-05-06 | End: 2022-05-08 | Stop reason: HOSPADM

## 2022-05-06 RX ORDER — GABAPENTIN 600 MG/1
600 TABLET ORAL 2 TIMES DAILY
Status: DISCONTINUED | OUTPATIENT
Start: 2022-05-06 | End: 2022-05-07

## 2022-05-06 RX ORDER — ACETAMINOPHEN 325 MG/1
650 TABLET ORAL
Status: COMPLETED | OUTPATIENT
Start: 2022-05-07 | End: 2022-05-07

## 2022-05-06 RX ORDER — HYDRALAZINE HYDROCHLORIDE 20 MG/ML
20 INJECTION INTRAMUSCULAR; INTRAVENOUS
Status: DISCONTINUED | OUTPATIENT
Start: 2022-05-06 | End: 2022-05-08 | Stop reason: HOSPADM

## 2022-05-06 RX ORDER — BENZONATATE 100 MG/1
100 CAPSULE ORAL
Status: DISCONTINUED | OUTPATIENT
Start: 2022-05-06 | End: 2022-05-08 | Stop reason: HOSPADM

## 2022-05-06 RX ORDER — ACETAMINOPHEN 325 MG/1
650 TABLET ORAL
Status: DISCONTINUED | OUTPATIENT
Start: 2022-05-06 | End: 2022-05-08 | Stop reason: HOSPADM

## 2022-05-06 RX ADMIN — Medication 1 SPRAY: at 21:27

## 2022-05-06 RX ADMIN — LISINOPRIL 10 MG: 10 TABLET ORAL at 17:44

## 2022-05-06 RX ADMIN — IOPAMIDOL 20 ML: 755 INJECTION, SOLUTION INTRAVENOUS at 06:36

## 2022-05-06 RX ADMIN — GABAPENTIN 600 MG: 600 TABLET, FILM COATED ORAL at 17:44

## 2022-05-06 RX ADMIN — IOPAMIDOL 100 ML: 755 INJECTION, SOLUTION INTRAVENOUS at 06:36

## 2022-05-06 RX ADMIN — TAMSULOSIN HYDROCHLORIDE 0.4 MG: 0.4 CAPSULE ORAL at 21:27

## 2022-05-06 RX ADMIN — CLOPIDOGREL BISULFATE 75 MG: 75 TABLET ORAL at 10:03

## 2022-05-06 RX ADMIN — ASPIRIN 81 MG 81 MG: 81 TABLET ORAL at 10:03

## 2022-05-06 RX ADMIN — GABAPENTIN 600 MG: 600 TABLET, FILM COATED ORAL at 10:03

## 2022-05-06 RX ADMIN — FINASTERIDE 5 MG: 5 TABLET, FILM COATED ORAL at 10:03

## 2022-05-06 RX ADMIN — ROSUVASTATIN 20 MG: 10 TABLET, FILM COATED ORAL at 21:27

## 2022-05-06 RX ADMIN — BENZONATATE 100 MG: 100 CAPSULE ORAL at 21:27

## 2022-05-06 RX ADMIN — DEXTROSE MONOHYDRATE 250 ML: 100 INJECTION, SOLUTION INTRAVENOUS at 06:51

## 2022-05-06 NOTE — ED NOTES
Called lab to please process add on labs:D-dimer, procalcitonin, and c-reactive protein. States will process.

## 2022-05-06 NOTE — PROGRESS NOTES
[address] REPORT:Verbal report received from ED RN(name) on Janelle Sr.  being received from ED(unit) for routine progression of care Report consisted of patients Situation, Background, Assessment and Recommendations(SBAR). Information from the following report(s) SBAR, Intake/Output, MAR, Recent Results and Cardiac Rhythm NSR was reviewed with the receiving nurse. Opportunity for questions and clarification was provided. Assessment completed upon patients arrival to unit and care assumed. 46 Pt arrived to IMCU. VSS. C/o mild LLE weakness. Assessment otherwise unremarkable.

## 2022-05-06 NOTE — PROGRESS NOTES
Reviewed patients home meds- family brought in bottles -   list is below but patient was taking too much glipizide (40MG DAILY)  - Glipizide ER 10 mg= 2 tabs daily in am- RX FROM PCP  Glipizide 10mg(IMMEDIATE RELEASE)- 1 tab twice daily- RX FROM HOSPITALIST FROM RECENT ADMISSION  GABAPENTIN 600MG PO BID  LISINOPRIL 10MG PO DAILY (THIS REPLACED ATENOLOL)  PROSCAR 5MG PO every day  ROSUVASTATIN- 20 MG every day  PLAVIX 75- MG PO DAILY  FLOMAX 0.4MG PO every day  ACARBOSE 25MG PO TID  METFORMIN 500MG PO BID  JARDIANCE 10MG = 1/2 TAB(5MG) PO every day  ASPIRIN 81MG DAILY  VIT D   MULTIVIT    PATIENT WAS NOT TAKING INSULIN, LOVASTATIN OR ROBAXIN

## 2022-05-06 NOTE — ED TRIAGE NOTES
Pt arrives via EMS for extremity weakness and slurred speech that has resolved upon his arrival to the ED. He states he woke up at 3am this morning and noticed he defecated on himself, had extremity weakness more pronounced on the left side and slurred speech. He states he waited about 2 hours for his symptoms to resolve and then called his friend, who called 911. He reiterates that he feels fine on arrival to the ED and denies neuro symptoms now.  A&Ox4 on arrival.

## 2022-05-06 NOTE — H&P
[facility] Adult  Hospitalist Group  History and Physical    Date of Service:  5/6/2022  Primary Care Provider: Lucy Stanley MD  Source of information: The patient and Chart review    Chief Complaint: Extremity Weakness      History of Presenting Illness:   Marline Steel is a 80 y.o. male who presents with weakness and slurred speech earlier this am- now resolved. He has a phx of arthritis, CAD with stent, DM, HTN, liver disease who p/w code stroke level 2 activation in the field. Pt reports he had a cough and cold that started a few days ago - he woke up at 3 AM and had defecated on himself. His 'body just gave out' and he was not able to move and lay in bed for two hours. He could also hear himself speak and it sounded slurred. Patient describes his weakness to be mostly bilateral affecting the arms and legs -but reports increased left leg pain and weakness that he attributes to vascular disease . He is due to see his vascular surgeon Dr. Rene Manning soon. He did try to stand up this morning and was unable to stand and tells me that he hit the floor but did not hit his head . After few hours at approximately  5 AM he was able to call his friend who then called EMS. Pt is currently asymptomatic. His neurological exam is unremarkable -family at the bedside reports no speech issues and no changes in his normal mental status -I tried to review his home medication list-patient was able to confirm most of his home medications but no longer takes insulin-he does continue to take aspirin and Plavix his last doses were yesterday morning-he does report one episode of loose stool-no difficulty swallowing no chest pain or shortness of breath-does have a mild dry cough on exam-      REVIEW OF SYSTEMS:  A comprehensive review of systems was negative except for that written in the History of Present Illness.      Past Medical History:   Diagnosis Date    Arthritis     CAD (coronary artery disease) coronary stent    Diabetes ([facility] [address]     iddm newly on insulin 3/2012    Hypertension     Liver disease    Peripheral vascular disease status post angioplasties-hyperlipidemia, history of cirrhosis from past alcohol use;  Chronic kidney disease stage III, BPH, history of elevated PSA, right rotator cuff injury, osteomyelitis of the foot, diabetic neuropathy    Past Surgical History:   Procedure Laterality Date    HX APPENDECTOMY      HX CHOLECYSTECTOMY      HX ORTHOPAEDIC      Rt. Knee cartlidge removal    HX TONSILLECTOMY      HX UROLOGICAL      Rt Orchiectomy    [place] CARDIAC SURG PROCEDURE UNLIST  1997    stents    [place] COLON [place] SCRN NOT  [address] IND  10/29/2013         VASCULAR SURGERY PROCEDURE UNLIST      carotid artery endarterectomy   Patient has had a previous prostate biopsy, right carotid endarterectomy, cataract surgery, amputation of right fourth toe    Prior to Admission medications    Medication Sig Start Date End Date Taking? Authorizing Provider   rosuvastatin (CRESTOR) 20 mg tablet Take 20 mg by mouth nightly. Provider, Historical   empagliflozin (Jardiance) 10 mg tablet Take  by mouth daily. Provider, Historical   clopidogreL (Plavix) 75 mg tab Take  by mouth. Provider, Historical   gabapentin (NEURONTIN) 600 mg tablet Take 600 mg by mouth two (2) times a day. Other, MD Reynold   finasteride (PROSCAR) 5 mg tablet Take 5 mg by mouth daily. Reynold Sepulveda MD   tamsulosin (FLOMAX) 0.4 mg capsule Take 0.4 mg by mouth nightly. Reynold Sepulveda MD   cholecalciferol (Vitamin D3) 25 mcg (1,000 unit) cap Take 1,000 Units by mouth daily. Reynold Sepulveda MD   insulin glargine (Lantus Solostar U-100 Insulin) 100 unit/mL (3 mL) inpn 15 Units by SubCUTAneous route daily. Patient not taking: Reported on 1/5/2022    Audie Peterson MD   methocarbamoL (Robaxin-750) 750 mg tablet Take 1 Tab by mouth four (4) times daily as needed for Muscle Spasm(s).   Patient not taking: Reported on 1/5/2022 4/10/21 Chani Sherman MD   aspirin delayed-release 81 mg tablet Take 81 mg by mouth daily. Provider, Historical   multivitamin (ONE A DAY) tablet Take 1 Tab by mouth daily. Provider, Historical   lovastatin (MEVACOR) 10 mg tablet Take 40 mg by mouth nightly. Patient not taking: Reported on 1/5/2022    Other, MD Reynold   .  Patient no longer takes insulin or lovastatin    Allergies   Allergen Reactions    Penicillins Rash      Family History   Problem Relation Age of Onset    No Known Problems Mother     No Known Problems Father       Social History: Past smoking history but quit more than 40 years ago-heavy past alcohol use but no current alcohol use  Social history-patient is  lives alone has 3 biological children and 3 stepchildren  He is retired and is independent with his mobility still drives does not use a cane or any devices with mobility    Objective:     Visit Vitals  BP (!) 156/91   Pulse (!) 118   Temp 98.2 °F (36.8 °C)   Resp 16   SpO2 94%      O2 Device: None (Room air)  Patient Vitals for the past 24 hrs:   Temp Pulse Resp BP SpO2   05/06/22 0725  (!) 115 16 (!) 171/84 94 %   05/06/22 0710  (!) 120 18 (!) 167/82 97 %   05/06/22 0655 98.2 °F (36.8 °C) (!) 118 16 (!) 156/91 94 %     PHYSICAL EXAM:   General: Alert x oriented x 3, awake, no acute distress, resting in bed, pleasant male, appears to be stated age  [de-identified]: PEERL, EOMI, moist mucus membranes  Neck: Supple, no JVD, no meningeal signs  Chest: Clear to auscultation bilaterally   CVS: RRR, S1 S2 heard, no murmurs/rubs/gallops  Abd: Soft, non-tender, non-distended, +bowel sounds   Ext: No clubbing, no cyanosis, no edema  Neuro/Psych: Pleasant mood and affect, CN 2-12 grossly intact, sensory grossly within normal limit, Strength 5/5 in all extremities,   Pulses: Decreased pulses and ankles-unable to palpate DP pulses weak but palpable posterior tibial pulses bilaterally  Skin: Warm, dry, without rashes or lesions    Data Review:    All diagnostic labs and studies have been reviewed. Recent Results (from the past 24 hour(s))   GLUCOSE, POC    Collection Time: 05/06/22  6:27 AM   Result Value Ref Range    Glucose (POC) 63 (L) 65 - 117 mg/dL    Performed by Donna castaneda RN    CBC WITH AUTOMATED DIFF    Collection Time: 05/06/22  6:35 AM   Result Value Ref Range    WBC 8.8 4.1 - 11.1 K/uL    RBC 4.26 4.10 - 5.70 M/uL    HGB 13.3 12.1 - 17.0 g/dL    HCT 39.2 36.6 - 50.3 %    MCV 92.0 80.0 - 99.0 FL    MCH 31.2 26.0 - 34.0 PG    MCHC 33.9 30.0 - 36.5 g/dL    RDW 13.0 11.5 - 14.5 %    PLATELET 444 (L) 297 - 400 K/uL    MPV 10.6 8.9 - 12.9 FL    NRBC 0.0 0  WBC    ABSOLUTE NRBC 0.00 0.00 - 0.01 K/uL    NEUTROPHILS 85 (H) 32 - 75 %    LYMPHOCYTES 7 (L) 12 - 49 %    MONOCYTES 8 5 - 13 %    EOSINOPHILS 0 0 - 7 %    BASOPHILS 0 0 - 1 %    IMMATURE GRANULOCYTES 0 0.0 - 0.5 %    ABS. NEUTROPHILS 7.5 1.8 - 8.0 K/UL    ABS. LYMPHOCYTES 0.6 (L) 0.8 - 3.5 K/UL    ABS. MONOCYTES 0.7 0.0 - 1.0 K/UL    ABS. EOSINOPHILS 0.0 0.0 - 0.4 K/UL    ABS. BASOPHILS 0.0 0.0 - 0.1 K/UL    ABS. IMM. GRANS. 0.0 0.00 - 0.04 K/UL    DF SMEAR SCANNED      RBC COMMENTS NORMOCYTIC, NORMOCHROMIC     METABOLIC PANEL, COMPREHENSIVE    Collection Time: 05/06/22  6:35 AM   Result Value Ref Range    Sodium 137 136 - 145 mmol/L    Potassium 4.8 3.5 - 5.1 mmol/L    Chloride 108 97 - 108 mmol/L    CO2 23 21 - 32 mmol/L    Anion gap 6 5 - 15 mmol/L    Glucose 61 (L) 65 - 100 mg/dL    BUN 41 (H) 6 - 20 MG/DL    Creatinine 1.87 (H) 0.70 - 1.30 MG/DL    BUN/Creatinine ratio 22 (H) 12 - 20      GFR est AA 42 (L) >60 ml/min/1.73m2    GFR est non-AA 35 (L) >60 ml/min/1.73m2    Calcium 8.9 8.5 - 10.1 MG/DL    Bilirubin, total 0.8 0.2 - 1.0 MG/DL    ALT (SGPT) 30 12 - 78 U/L    AST (SGOT) 31 15 - 37 U/L    Alk.  phosphatase 56 45 - 117 U/L    Protein, total 7.8 6.4 - 8.2 g/dL    Albumin 3.6 3.5 - 5.0 g/dL    Globulin 4.2 (H) 2.0 - 4.0 g/dL    A-G Ratio 0.9 (L) 1.1 - 2.2 PROTHROMBIN TIME + INR    Collection Time: 05/06/22  6:35 AM   Result Value Ref Range    INR 1.1 0.9 - 1.1      Prothrombin time 11.1 9.0 - 11.1 sec   SAMPLES BEING HELD    Collection Time: 05/06/22  6:35 AM   Result Value Ref Range    SAMPLES BEING HELD 1red     COMMENT        Add-on orders for these samples will be processed based on acceptable specimen integrity and analyte stability, which may vary by analyte. EKG, 12 LEAD, INITIAL    Collection Time: 05/06/22  6:58 AM   Result Value Ref Range    Ventricular Rate 115 BPM    Atrial Rate 115 BPM    P-R Interval 150 ms    QRS Duration 106 ms    Q-T Interval 316 ms    QTC Calculation (Bezet) 437 ms    Calculated P Axis 59 degrees    Calculated R Axis -52 degrees    Calculated T Axis 81 degrees    Diagnosis       Sinus tachycardia  Left anterior fascicular block  Abnormal ECG  When compared with ECG of 16-FEB-2017 10:33,  No significant change was found     GLUCOSE, POC    Collection Time: 05/06/22  7:20 AM   Result Value Ref Range    Glucose (POC) 140 (H) 65 - 117 mg/dL    Performed by Raghav Lewis          IMAGING:   CTA CODE NEURO HEAD AND NECK W CONT         CT CODE NEURO PERF W CBF         CT CODE NEURO HEAD WO CONTRAST   Final Result   No acute intracranial hemorrhage, mass or infarct. Stable pattern of   atrophy and chronic white matter change most compatible with small vessel   ischemic and/or senescent change. Intracranial atherosclerosis.            ECG/ECHO:    Results for orders placed or performed during the hospital encounter of 05/06/22   EKG, 12 LEAD, INITIAL   Result Value Ref Range    Ventricular Rate 115 BPM    Atrial Rate 115 BPM    P-R Interval 150 ms    QRS Duration 106 ms    Q-T Interval 316 ms    QTC Calculation (Bezet) 437 ms    Calculated P Axis 59 degrees    Calculated R Axis -52 degrees    Calculated T Axis 81 degrees    Diagnosis       Sinus tachycardia  Left anterior fascicular block  Abnormal ECG  When compared with ECG of 16-FEB-2017 10:33,  No significant change was found        CT Results  (Last 48 hours)               05/06/22 0646  CTA CODE NEURO HEAD AND NECK W CONT Final result    Impression:      No evidence for acute large vessel arterial occlusion. Mild to moderate   atherosclerotic changes as described above. Narrative:         60% stenosis of the left carotid bulb. Left subclavian 90% stenosis (series 2, image 65)   Right vertebral origin 95% stenosis (series 2, image 131)   No large vessel occlusion       Preliminary report was provided by Dr. Nancy Espinoza, the on-call radiologist, at 1858   hours       Final report to follow. FINAL REPORT:       CLINICAL HISTORY: Code stroke       EXAMINATION:  CT ANGIOGRAPHY HEAD AND NECK        COMPARISON: CT head 5/6/2022       TECHNIQUE:  Following the uneventful administration of iodinated contrast   material, axial CT angiography of the head and neck was performed. Delayed axial   images through the head were also obtained. Coronal and sagittal reconstructions   were obtained. Manual postprocessing of images was performed. 3-D  Sagittal   maximal intensity projection images were obtained. 3-D Coronal maximal   intensity projections were obtained. CT dose reduction was achieved through use   of a standardized protocol tailored for this examination and automatic exposure   control for dose modulation. FINDINGS:       Delayed contrast-enhanced head CT:       The ventricles are midline without hydrocephalus. There is no acute intra or   extra-axial hemorrhage. Mild bilateral subcortical and periventricular areas of   patchy low attenuation is nonspecific but likely related to changes of chronic   small vessel ischemic disease. The basal cisterns are clear. The paranasal   sinuses are clear.        CTA NECK:       Great vessels: Patent origins       Right subclavian artery: Patent with mild atherosclerosis       Left subclavian artery: Patent with moderate proximal atherosclerosis causing at   least 50% stenosis       Right common carotid artery: Patent with mild atherosclerosis       Left common carotid artery: Patent with mild to moderate distal atherosclerosis   causing less than 50% stenosis       Cervical right internal carotid artery: Patent with no significant stenosis by   NASCET criteria. Chronic appearing focal intimal flap in the proximal external   carotid artery. Cervical left internal carotid artery: Patent with proximal atherosclerosis   causing 50-60 % stenosis by NASCET criteria. Right vertebral artery: Patent with moderate proximal atherosclerosis       Left vertebral artery: Patent       Moderate degenerative changes in the cervical spine. CTA HEAD:       Right cavernous internal carotid artery: Patent with mild to moderate   atherosclerosis       Left cavernous internal carotid artery: Patent with mild to moderate   atherosclerosis       Anterior cerebral arteries: Patent with mild atherosclerosis       Anterior communicating artery: Patent       Right middle cerebral artery: Patent with mild atherosclerosis       Left middle cerebral artery: Patent with mild atherosclerosis       Posterior communicating arteries: Patent       Posterior cerebral arteries: Patent with mild atherosclerosis       Basilar artery: Patent       Distal vertebral arteries: Patent with mild bilateral atherosclerosis       Measurements use NASCET criteria. 05/06/22 0640  CT CODE NEURO PERF W CBF Final result    Impression:      No significant cerebral perfusion abnormality. Narrative:         No significant perfusion abnormality. Preliminary report was provided by Dr. Sharad Doyle, the on-call radiologist, at   06:49       Final report to follow.               CT perfusion analysis was performed using CT with contrast administration,   including postprocessing of parametric maps with the determination of cerebral   blood flow, cerebral blood volume, and mean transit time. CT dose reduction was achieved through use of a standardized protocol tailored   for this examination and automatic exposure control for dose modulation. This study was analyzed by the [address] ai algorithm       FINDINGS:       No significant cerebral perfusion abnormality. 05/06/22 0634  CT CODE NEURO HEAD WO CONTRAST Final result    Impression:  No acute intracranial hemorrhage, mass or infarct. Stable pattern of   atrophy and chronic white matter change most compatible with small vessel   ischemic and/or senescent change. Intracranial atherosclerosis. Narrative:  EXAM: CT CODE NEURO HEAD WO CONTRAST       INDICATION: Code Stroke       COMPARISON: CT 2/16/2017. CONTRAST: None. TECHNIQUE: Unenhanced CT of the head was performed using 5 mm images. Brain and   bone windows were generated. Coronal and sagittal reformats. CT dose reduction   was achieved through use of a standardized protocol tailored for this   examination and automatic exposure control for dose modulation. FINDINGS: There is no acute intracranial hemorrhage, mass, mass effect or   herniation. Ventricles and sulci show no significant change in proportionate and   symmetric prominence. There is no significant change in pattern of mild   periventricular white matter hypodensity. The gray-white matter differentiation   is well-preserved. Atherosclerotic calcifications are seen within the carotid   siphons and vertebral arteries. The mastoid air cells are well pneumatized. The   visualized paranasal sinuses are normal.               Assessment:   Given the patient's current clinical presentation, there is a high level of concern for decompensation if discharged from the emergency department.  Complex decision making was performed, which includes reviewing the patient's available past medical records, laboratory results, and imaging studies. Active Problems:    * No active hospital problems. *    Plan:     1. Weakness with slurred speech now resolved-spacious for TIA  Admit for TIA work-up-CT and CTA results reviewed-with peripheral vascular disease findings noted  Do not feel compelled to order patient MRI at this point  Consult neurology for further work-up check lipid panel restart patient on aspirin and Plavix  OT PT and speech consults    2. Cough weakness with episode of incontinence-tested for COVID infection  3. Chronic kidney disease stage III with baseline creatinine 1.7-1. 85-labile at baseline  4. Arthritis with peripheral neuropathy-resume gabapentin  5. BPH-resume Proscar and Flomax  6. Erratic blood pressure elevated on admission and repeat blood pressure during my exam is low normal  -Tachycardia-unclear etiology continue to monitor on telemetry for arrhythmias  7. Peripheral vascular disease-no evidence of arterial occlusion by clinical exam-continue aspirin Plavix  8. Hyperlipidemia-fasting lipid panel this morning and resume home statin medication  9. Diabetes-Accu-Cheks every 6 hours with sliding scale insulin if needed  Mild hypoglycemia on admission-dextrose given  10. Full code-surrogate decision maker is patient's daughter Si Query [phone]        DIET: DIET NPO   ISOLATION PRECAUTIONS: There are currently no Active Isolations  CODE STATUS: full code  DVT PROPHYLAXIS: SCDs  FUNCTIONAL STATUS PRIOR TO HOSPITALIZATION: Fully active and ambulatory; able to carry on all self-care without restriction. EARLY MOBILITY ASSESSMENT: Recommend routine ambulation while hospitalized with the assistance of nursing staff  ANTICIPATED DISCHARGE: less than 24 hours.   EMERGENCY CONTACT/SURROGATE DECISION MAKER: see above      Signed By: Yasmine Orr MD     May 6, 2022

## 2022-05-06 NOTE — ED NOTES
Last known well as reported by pt was 2100 hours last night when he went to bed. Level 2 stroke alert was called for the pt upon his arrival to the ED.

## 2022-05-06 NOTE — PROGRESS NOTES
SPEECH PATHOLOGY BEDSIDE SWALLOW EVALUATION/DISCHARGE  Patient: Mackenzie Richardson Sr. (80 y.o. male)  Date: 5/6/2022  Primary Diagnosis: Weakness [R53.1]  Slurred speech [R47.81]       Precautions:   Fall,Other (comment) (droplet+)    ASSESSMENT :  Based on the objective data described below, the patient presents with no oral/pharyngeal dysphagia, and no overt s/s aspiration observed. Patient reported slurred speech has resolved, and speech function is currently at baseline. Patient denied decline in language or cognitive function, and patient Ox4. Skilled acute therapy provided by a speech-language pathologist is not indicated at this time. PLAN :  Recommendations:  -Regular/thin liquid diet  Discharge Recommendations: None     SUBJECTIVE:   Patient stated I have a sore throat when asked about dysphagia. OBJECTIVE:     Past Medical History:   Diagnosis Date    Arthritis     CAD (coronary artery disease)     coronary stent    Diabetes ([facility] [address]     iddm newly on insulin 3/2012    Hypertension     Liver disease      Past Surgical History:   Procedure Laterality Date    HX APPENDECTOMY      HX CHOLECYSTECTOMY      HX ORTHOPAEDIC      Rt.  Knee cartlidge removal    HX TONSILLECTOMY      HX UROLOGICAL      Rt Orchiectomy    [place] CARDIAC SURG PROCEDURE UNLIST  1997    stents    [place] COLON CA SCRN NOT [facility] IND  10/29/2013         VASCULAR SURGERY PROCEDURE UNLIST      carotid artery endarterectomy     Prior Level of Function/Home Situation:   Home Situation  Home Environment: Private residence  # Steps to Enter: 2  Rails to [employer]: No (post used as handrail)  One/Two Story Residence: One story  Living Alone: Yes  Support Systems: Child(masha)  Patient Expects to be Discharged to[de-identified] Home with family assistance  Current DME Used/Available at Home: Walker, rolling,Shower chair,Grab bars  Tub or Shower Type: Shower  Diet prior to admission: regular/thin  Current Diet:  Regular/thin   Cognitive and Communication Status:  Neurologic State: Alert  Orientation Level: Oriented X4  Cognition: Follows commands           Oral Assessment:  Oral Assessment  Labial: No impairment  Dentition: Full  Oral Hygiene: moist  Lingual: No impairment  Velum: Unable to visualize  Mandible: No impairment  P.O. Trials:  Patient Position: upright in bed  Vocal quality prior to P.O.: No impairment  Consistency Presented: Thin liquid; Solid  How Presented: Self-fed/presented;Straw;Successive swallows     Bolus Acceptance: No impairment  Bolus Formation/Control: No impairment     Propulsion: No impairment  Oral Residue: None  Initiation of Swallow: No impairment  Laryngeal Elevation: Functional  Aspiration Signs/Symptoms: None  Pharyngeal Phase Characteristics: No impairment, issues, or problems              Oral Phase Severity: No impairment  Pharyngeal Phase Severity : No impairment  NOMS:   The NOMS functional outcome measure was used to quantify this patient's level of swallowing impairment. Based on the NOMS, the patient was determined to be at level 7 for swallow function     NOMS Swallowing Levels:  Level 1 (CN): NPO  Level 2 (CM): NPO but takes consistency in therapy  Level 3 (CL): Takes less than 50% of nutrition p.o. and continues with nonoral feedings; and/or safe with mod cues; and/or max diet restriction  Level 4 (CK): Safe swallow but needs mod cues; and/or mod diet restriction; and/or still requires some nonoral feeding/supplements  Level 5 (CJ): Safe swallow with min diet restriction; and/or needs min cues  Level 6 (CI): Independent with p.o.; rare cues; usually self cues; may need to avoid some foods or needs extra time  Level 7 ([address] [place] [address]): Independent for all p.o.  KATHLEEN (2003). National Outcomes Measurement System (NOMS): Adult Speech-Language Pathology User's Guide.        Pain:  Pain Scale 1: Numeric (0 - 10)  Pain Intensity 1: 0     After treatment:   Patient left in no apparent distress in bed, Call bell within reach and Nursing notified    COMMUNICATION/EDUCATION:   Patient was educated regarding his deficit(s) of WFL swallow as this relates to his diagnosis. He demonstrated Good understanding as evidenced by verbalizing understanding. The patient's plan of care including recommendations, planned interventions, and recommended diet changes were discussed with: Registered nurse.      Thank you for this referral.  Mara Lopez SLP  Time Calculation: 10 mins

## 2022-05-06 NOTE — CONSULTS
Neurocritical Care Code Stroke Documentation    Symptoms:   weakness, slurred speech, incontinence of bowel, with complete resolution of symptoms   Last Known Well: 22   Medical hx: Active Problems:    CVA (cerebral vascular accident) ([facility] [address] (2021)  Possible TIA   Anticoagulation: ASA/Plavix   VAN:   Negative   NIHSS:   1a-LOC:0    1b-Month/Age:0    1c-Open/Close Hand:0    2-Best Gaze:0    3-Visual Fields:0    4-Facial Palsy:0    5a-Left Arm:0    5b-Right Arm:0    6a-Left Le    6b-Right Le    7-Limb Ataxia:0    8-Sensory:0    9-Best Language:0    10-Dysarthria:0    11-Extinction/Inattention:0  TOTAL SCORE:0   Imaging: CT CODE NEURO HEAD WO CONTRAST    Result Date: 2022  No acute intracranial hemorrhage, mass or infarct. Stable pattern of atrophy and chronic white matter change most compatible with small vessel ischemic and/or senescent change. Intracranial atherosclerosis. CT CODE NEURO PERF W CBF: Patient Communication    Not Released Not seen     Study Result    Narrative & Impression   *PRELIMINARY REPORT*  No significant perfusion abnormality. Preliminary report was provided by Dr. Terri Mensah, the on-call radiologist, at  06:49  Final report to follow. *END PRELIMINARY REPORT*      Plan:   TPA Candidate: NO    Mechanical thrombectomy Candidate: NO     Discussed with: Dr. Estela Gee MD    Patient reports that about 3 am, he woke up and was unable to move. He had been incontinent of bowel, and was unable to get out of bed for 2-3 hours, when he was then able to move all of his extremities and call his friend, who called EMS. Patient [facility] at [facility] at [facility] and symptoms completely resolved. ER will do TIA work up. Time spent: 20 minutes.      Xenia Nava PA-C  Neurocritical Care Physician Assistant

## 2022-05-06 NOTE — ED NOTES
TRANSFER - OUT REPORT:    Verbal report given to Galina Dotson RN(name) on Jose Boston Sr.  being transferred to [facility] Rm 419/01 (unit) for routine progression of care       Report consisted of patients Situation, Background, Assessment and   Recommendations(SBAR). Information from the following report(s) SBAR, Kardex, ED Summary, Intake/Output, MAR, Recent Results, Med Rec Status and Cardiac Rhythm ST/NSR was reviewed with the receiving nurse. Lines:   Peripheral IV 05/06/22 Left Antecubital (Active)   Site Assessment Clean, dry, & intact 05/06/22 0626   Phlebitis Assessment 0 05/06/22 0626   Infiltration Assessment 0 05/06/22 0626   Dressing Status Clean, dry, & intact 05/06/22 1775        Opportunity for questions and clarification was provided.

## 2022-05-06 NOTE — PROGRESS NOTES
PHYSICAL THERAPY EVALUATION/DISCHARGE  Patient: Amaya Meyers Sr. (80 y.o. male)  Date: 5/6/2022  Primary Diagnosis: Weakness [R53.1]  Slurred speech [R47.81]       Precautions:   Fall,Other (comment) (droplet+)      ASSESSMENT  Based on the objective data described below, the patient presents at his functional baseline s/p admission for CVA work up, found to be covid+. Images show no acute infarct. Pt ambulated throughout the room on RA, VSS, no SOB. Patient is independent with transfers, bed mobility, and ambulating w/o a device. Patient has no acute therapy needs. Recommended he continue to call for staff assist before getting up d/t recent history, sit up in the chair for meals, and walk throughout the day to maintain strength and endurance. Report provided to RN. Will sign off. Functional Outcome Measure: The patient scored 75/100 on the Barthel outcome measure. Other factors to consider for discharge: lives alone, covid+     Further skilled acute physical therapy is not indicated at this time. PLAN :  Recommendation for discharge: (in order for the patient to meet his/her long term goals)  No skilled physical therapy/ follow up rehabilitation needs identified at this time. This discharge recommendation:  Has not yet been discussed the attending provider and/or case management    IF patient discharges home will need the following DME: none       SUBJECTIVE:   Patient stated I travel all over the place.   (w/ his job)    OBJECTIVE DATA SUMMARY:   HISTORY:    Past Medical History:   Diagnosis Date    Arthritis     CAD (coronary artery disease)     coronary stent    Diabetes ([facility] [address]     iddm newly on insulin 3/2012    Hypertension     Liver disease      Past Surgical History:   Procedure Laterality Date    HX APPENDECTOMY      HX CHOLECYSTECTOMY      HX ORTHOPAEDIC      Rt.  Knee cartlidge removal    HX TONSILLECTOMY      HX UROLOGICAL      Rt Orchiectomy    MD CARDIAC SURG PROCEDURE UNLIST  1997    stents    [place] COLON CA SCRN NOT  [address] IND  10/29/2013         VASCULAR SURGERY PROCEDURE UNLIST      carotid artery endarterectomy       Prior level of function: independent, no falls since foot surgery Jan 2022  Personal factors and/or comorbidities impacting plan of care: covid+    Home Situation  Home Environment: Private residence  # Steps to Enter: 2  Rails to Enter: No (post used as handrail)  One/Two Story Residence: One story  Living Alone: Yes  Support Systems: Child(masha)  Patient Expects to be Discharged to[de-identified] Home with family assistance  Current DME Used/Available at Home: Walker, rolling,Shower chair,Grab bars  Tub or Shower Type: Shower    EXAMINATION/PRESENTATION/DECISION MAKING:   Critical Behavior:  Neurologic State: Alert  Orientation Level: Oriented X4  Cognition: Follows commands  Safety/Judgement: Good awareness of safety precautions  Hearing: Auditory  Auditory Impairment: None    Range Of Motion:  AROM: Within functional limits           PROM: Within functional limits           Strength:    Strength: Within functional limits                    Tone & Sensation:   Tone: Normal              Sensation: Intact               Coordination:  Coordination: Within functional limits  Vision:   Acuity: Within Defined Limits  Corrective Lenses: Glasses  Functional Mobility:  Bed Mobility:     Supine to Sit: Modified independent  Sit to Supine: Modified independent     Transfers:  Sit to Stand: Supervision  Stand to Sit: Supervision        Bed to Chair: Supervision              Balance:   Sitting: Intact  Standing: Intact  Ambulation/Gait Training:  Ambulated 20 ft and again 100 ft w/ gait belt, no device. Gait was initially unsteady with pt requiring one hand held assist.  Balance improved with time up and pt independent ambulating with steady gait. Requested pt continue to call for staff assist before getting up d/t recent history of weakness with a fall.     Pt voiced agreement. Therapeutic Education:   Educated pt in his fall risk and to call for staff assist before getting up  Educated pt in [address] written on white board. Pt verbalized understanding. Functional Measure:  Barthel Index:    Bathin  Bladder: 10  Bowels: 10  Groomin  Dressin  Feeding: 10  Mobility: 15 (limited only due to COVID isolation)  Stairs: 0 (unable to due to COVID isolation)  Toilet Use: 10  Transfer (Bed to Chair and Back): 10  Total: 75/100       The Barthel ADL Index: Guidelines  1. The index should be used as a record of what a patient does, not as a record of what a patient could do. 2. The main aim is to establish degree of independence from any help, physical or verbal, however minor and for whatever reason. 3. The need for supervision renders the patient not independent. 4. A patient's performance should be established using the best available evidence. Asking the patient, friends/relatives and nurses are the usual sources, but direct observation and common sense are also important. However direct testing is not needed. 5. Usually the patient's performance over the preceding 24-48 hours is important, but occasionally longer periods will be relevant. 6. Middle categories imply that the patient supplies over 50 per cent of the effort. 7. Use of aids to be independent is allowed. Score Interpretation (from [address] [address])    Independent   60-79 Minimally independent   40-59 Partially dependent   20-39 Very dependent   <20 Totally dependent     -Genesis Nichols., Barthel, D.W. (1965). Functional evaluation: the Barthel Index. [address] [facility] ([address] (1997). The Barthel activities of daily living index: self-reporting versus actual performance in the old (> or = 75 years). Journal of [address] [place] [address] 45(7), [address] [facility], J.J.M.F, Mejia Denney., Romina Arroyo. (1999). Measuring the change in disability after inpatient rehabilitation; comparison of the responsiveness of the Barthel Index and Functional [place] Measure. Journal of Neurology, Neurosurgery, and Psychiatry, 66(4), 730-951. MELITA Reyes, FRANCISCO Mclean, & Cynthia Rabago M.A. (2004) Assessment of post-stroke quality of life in cost-effectiveness studies: The usefulness of the Barthel Index and the EuroQoL-5D. Quality of Life Research, 15, 450-07            Physical Therapy Evaluation Charge Determination   History Examination Presentation Decision-Making   LOW Complexity : Zero comorbidities / personal factors that will impact the outcome / POC LOW Complexity : 1-2 Standardized tests and measures addressing body structure, function, activity limitation and / or participation in recreation  LOW Complexity : Stable, uncomplicated  Other outcome measures Barthel 75/100  MEDIUM      Based on the above components, the patient evaluation is determined to be of the following complexity level: LOW     Pain Rating:  None reported    Activity Tolerance:   Good, Fair and SpO2 stable on RA      After treatment patient left in no apparent distress:   Supine in bed, Call bell within reach and Side rails x 3    COMMUNICATION/EDUCATION:   The patients plan of care was discussed with: Occupational therapist and Registered nurse. Fall prevention education was provided and the patient/caregiver indicated understanding., Patient/family have participated as able in goal setting and plan of care. and Patient/family agree to work toward stated goals and plan of care.     Thank you for this referral.  Carmen Aquino, PT   Time Calculation: 35 mins

## 2022-05-06 NOTE — PROGRESS NOTES
Transition of Care Plan   RUR: obs   Disposition: The disposition plan is pending medical progression   F/U with PCP/Specialist   Transport: family    Reason for Admission:  Weakness & slurred speech                     RUR Score:   n/a                  Plan for utilizing home health:   TBD       PCP: First and Last name:  Germaine Durbin MD     Name of Practice:    Are you a current patient: Yes/No:    Approximate date of last visit:    Can you participate in a virtual visit with your PCP:                     Current Advanced Directive/Advance Care Plan: Full Code      Healthcare Decision Maker:                     Transition of Care Plan:                      CRM spoke with patient's daughter, introduced self, explained role, verified demographics, and offered assistance. Patient lives in a home alone with 2 steps to enter. The patient is independent in his ADL's/IADL's and has a cane and walker. Patient uses [email or web address] mail order for his prescriptions. Care Management Interventions  PCP Verified by CM: Yes  Palliative Care Criteria Met (RRAT>21 & CHF Dx)?: No  Mode of Transport at Discharge: Other (see comment) (family)  Transition of Care Consult (CM Consult): Discharge Planning  MyChart Signup: No  Discharge Durable Medical Equipment: No  Health Maintenance Reviewed: Yes  Physical Therapy Consult: Yes  Occupational Therapy Consult: Yes  Speech Therapy Consult: Yes  Support Systems: Child(masha)  Confirm Follow Up Transport: Family  [employer] Information Provided?: No  Discharge Location  Patient Expects to be Discharged to[de-identified] Home with family assistance    Medicare Outpatient Observation Notice (MOON)/ [place] Outpatient Observation Notice (Edil Folds) provided to patient/representative with verbal explanation of the notice. Time allotted for questions regarding the notice. Patient /representative provided a completed copy of the MOON/VOON notice.   Copy placed on bedside chart.    2:01 PM  Tony Andrade Clayton Carroll

## 2022-05-06 NOTE — CONSULTS
NEUROLOGY   INPATIENT EVALUATION/CONSULTATION       PATIENT NAME: Kevin Putnam Sr. MRN: [id number]    REASON FOR CONSULTATION: Paroxysmal spell    05/06/22      HISTORY OF PRESENT ILLNESS:  Umer Gillette is a 80 y.o. right-hand-dominant gentleman who presented to [address] with reports of an unusual episode this morning. Patient went to bed in his usual state of health, states that his outpatient doctors had recently cut down on his glipizide due to morning hypoglycemia. Woke up around 3 AM feeling as if he could not move any limb in his body other than perhaps his left arm. Noted that when he spoke to himself his voice sounded slurred. Lastly that he reports that he defecated on himself during this episode upon awakening. Could not move and eventually pushed himself out of bed so he could reach the phone. Notes that when EMS came his blood glucose was 109 but notes that it dipped down into the 70s in the ER. States that typically when his blood sugar is low he will feel nervous. Does endorse feeling diaphoretic during his episode does not really endorse any other systemic symptoms. Also notes difficulty with fluctuant blood pressures. Currently stable has no residual symptoms. CT CTA were unrevealing, routine EEG has been obtained with no significant findings. Denies any other symptoms no past medical history such as this episode no history of TIA or stroke. PAST MEDICAL HISTORY:  Past Medical History:   Diagnosis Date    Arthritis     CAD (coronary artery disease)     coronary stent    Diabetes ([facility] [address]     iddm newly on insulin 3/2012    Hypertension     Liver disease        PAST SURGICAL HISTORY:  Past Surgical History:   Procedure Laterality Date    HX APPENDECTOMY      HX CHOLECYSTECTOMY      HX ORTHOPAEDIC      Rt.  Knee cartlidge removal    HX TONSILLECTOMY      HX UROLOGICAL      Rt Orchiectomy    MS CARDIAC SURG PROCEDURE UNLIST  1997    stents    MS COLON CA SCRN NOT HI RSK IND  10/29/2013         VASCULAR SURGERY PROCEDURE UNLIST      carotid artery endarterectomy       FAMILY HISTORY:   Family History   Problem Relation Age of Onset    No Known Problems Mother     No Known Problems Father          SOCIAL HISTORY:  Social History     Socioeconomic History    Marital status:    Tobacco Use    Smoking status: Never Smoker    Smokeless tobacco: Never Used   Substance and Sexual Activity    Alcohol use: No    Drug use: No         MEDICATIONS:   Current Facility-Administered Medications   Medication Dose Route Frequency Provider Last Rate Last Admin    dextrose 10 % infusion 250 mL  250 mL IntraVENous PRN Faina Crump MD 1,000 mL/hr at 05/06/22 0651 250 mL at 05/06/22 0651    acetaminophen (TYLENOL) tablet 650 mg  650 mg Oral Q4H PRN Dave Goodwin MD        Or    acetaminophen (TYLENOL) solution 650 mg  650 mg Per NG tube Q4H PRN Dave Goodwin MD        Or    acetaminophen (TYLENOL) suppository 650 mg  650 mg Rectal Q4H PRN Dave Goodwin MD        aspirin chewable tablet 81 mg  81 mg Oral DAILY Dave Goodwin MD   81 mg at 05/06/22 1003    clopidogreL (PLAVIX) tablet 75 mg  75 mg Oral DAILY Dave Goodwin MD   75 mg at 05/06/22 1003    finasteride (PROSCAR) tablet 5 mg  5 mg Oral DAILY Dave Goodwin MD   5 mg at 05/06/22 1003    gabapentin (NEURONTIN) tablet 600 mg  600 mg Oral BID Dave Goodwin MD   600 mg at 05/06/22 1003    rosuvastatin (CRESTOR) tablet 20 mg  20 mg Oral QHS aDve Goodwin MD        tamsulosin (FLOMAX) capsule 0.4 mg  0.4 mg Oral QHS Dave Goodwin MD             ALLERGIES:  Allergies   Allergen Reactions    Penicillins Rash         REVIEW OF SYSTEMS:  10 point ROS reviewed with patient and negative except for those listed above. PHYSICAL EXAM:  Vital Signs:   Visit Vitals  BP (!) 173/66 (BP 1 Location: Right upper arm, BP Patient Position: Sitting; Other (Comment)) Comment (BP Patient Position): after walking   Pulse (!) 105   Temp 98.6 °F (37 °C)   Resp 16   Ht 5' 8\" (1.727 m)   Wt 176 lb (79.8 kg)   SpO2 100%   BMI 26.76 kg/m²     Pleasant male resting ablating exam room in no clear distress. HEENT appears grossly unremarkable neck appears supple. Cardiopulmonary exams appear unrevealing. Abdomen is nondistended. Extremities are warm/dry. Neurologically, patient appears alert and oriented attention appears intact. Speech is clear, language fluent. Cranial nerves II through XII appear grossly unrevealing. Motorically patient moves all extremities with near symmetric strength slight weakness in left leg which she says is baseline. Sensation appears grossly intact. Coordination is intact upper extremities. Remainder examination is deferred. PERTINENT DATA:  CT Results (maximum last 3): MRI Results (maximum last 3): ASSESSMENT:      Javier Dinh is an 80year old RH dominant male presented to [facility] ER with symptoms of whole body paralysis with exception of his left arm this morning out of sleep, unclear etiology    RECOMMENDATIONS:  Atypical spell:  Symptoms are challenging to localize from a stroke standpoint patient was not locked-in as he says he could move one arm as well as his mouth hard to think of another location (single) which would explain his symptoms  Would favor a metabolic/hemodynamic disturbance (hypoglycemia, blood pressure fluctuation) over a neurologic or infectious in setting of COVID-19  Noncontrast head CT was reassuring, EEG was unremarkable from both a seizure standpoint as well as not demonstrating any notable asymmetry or disruption of the waking background rhythm which all may seen depending on location of infarct  At this juncture would continue to treat medical conditions and not entertain further the idea of stroke unless patient has recurrent episodes at which point further evaluation should be done.   Patient is already on aspirin, LDL, A1c are at goal.  Please call with questions, concerns      Gisel Stevenson MD

## 2022-05-06 NOTE — ED PROVIDER NOTES
Pt is a 79 yo male with hx of arthritis, CAD with stent, DM, HTN, liver disease who p/w code stroke level 2 activation in the field. Pt reports he woke up at 3 AM and had defecated on himself. His 'body just gave out' and he was not able to move and lay in bed for two hours. He could also hear himself speak and it sounded slurred. At 5 AM he was able to call his friend who then called EMS. Pt is currently asymptomatic. Denies associated pain, focal weakness of numbness. Past Medical History:   Diagnosis Date    Arthritis     CAD (coronary artery disease)     coronary stent    Diabetes ([facility] [address]     iddm newly on insulin 3/2012    Hypertension     Liver disease        Past Surgical History:   Procedure Laterality Date    HX APPENDECTOMY      HX CHOLECYSTECTOMY      HX ORTHOPAEDIC      Rt.  Knee cartlidge removal    HX TONSILLECTOMY      HX UROLOGICAL      Rt Orchiectomy    ME CARDIAC SURG PROCEDURE UNLIST  1997    stents    ME COLON CA SCRN NOT HI RSK IND  10/29/2013         VASCULAR SURGERY PROCEDURE UNLIST      carotid artery endarterectomy         Family History:   Problem Relation Age of Onset    No Known Problems Mother     No Known Problems Father        Social History     Socioeconomic History    Marital status:      Spouse name: Not on file    Number of children: Not on file    Years of education: Not on file    Highest education level: Not on file   Occupational History    Not on file   Tobacco Use    Smoking status: Never Smoker    Smokeless tobacco: Never Used   Substance and Sexual Activity    Alcohol use: No    Drug use: No    Sexual activity: Not on file   Other Topics Concern    Not on file   Social History Narrative    Not on file     Social Determinants of Health     Financial Resource Strain:     Difficulty of Paying Living Expenses: Not on file   Food Insecurity:     Worried About Running Out of Food in the Last Year: Not on file    [address] in the Last Year: Not on file   Transportation Needs:     Lack of Transportation (Medical): Not on file    Lack of Transportation (Non-Medical): Not on file   Physical Activity:     Days of Exercise per Week: Not on file    Minutes of Exercise per Session: Not on file   Stress:     Feeling of Stress : Not on file   Social Connections:     Frequency of Communication with Friends and Family: Not on file    Frequency of Social Gatherings with Friends and Family: Not on file    Attends Religion Services: Not on file    Active Member of [address] [place] [address] [employer] or Organizations: Not on file    Attends Club or Organization Meetings: Not on file    Marital Status: Not on file   Intimate Partner Violence:     Fear of Current or Ex-Partner: Not on file    Emotionally Abused: Not on file    Physically Abused: Not on file    Sexually Abused: Not on file   Housing Stability:     Unable to Pay for Housing in the Last Year: Not on file    Number of [place] in the Last Year: Not on file    Unstable Housing in the Last Year: Not on file         ALLERGIES: Penicillins    Review of Systems   Constitutional: Negative for chills and fever. HENT: Negative for drooling and nosebleeds. Eyes: Negative for pain and itching. Respiratory: Negative for choking and stridor. Cardiovascular: Negative for leg swelling. Gastrointestinal: Negative for abdominal pain and rectal pain. Endocrine: Negative for heat intolerance and polyphagia. Genitourinary: Negative for enuresis and genital sores. Musculoskeletal: Negative for arthralgias and joint swelling. Skin: Negative for color change. Allergic/Immunologic: Negative for immunocompromised state. Neurological: Positive for weakness. Negative for tremors and speech difficulty. Hematological: Negative for adenopathy. Psychiatric/Behavioral: Negative for dysphoric mood and sleep disturbance. There were no vitals filed for this visit.          Physical Exam  Vitals and nursing note reviewed. Constitutional:       General: He is not in acute distress. Appearance: He is well-developed. He is not ill-appearing, toxic-appearing or diaphoretic. HENT:      Head: Normocephalic and atraumatic. Nose: Nose normal.      Mouth/Throat:      Mouth: Mucous membranes are moist.   Eyes:      Conjunctiva/sclera: Conjunctivae normal.   Cardiovascular:      Rate and Rhythm: Normal rate and regular rhythm. Heart sounds: Normal heart sounds. Pulmonary:      Effort: Pulmonary effort is normal. No respiratory distress. Breath sounds: Normal breath sounds. Abdominal:      General: There is no distension. Palpations: Abdomen is soft. Musculoskeletal:         General: No deformity. Normal range of motion. Cervical back: Normal range of motion and neck supple. Skin:     General: Skin is warm and dry. Neurological:      Mental Status: He is alert and oriented to person, place, and time. Cranial Nerves: No cranial nerve deficit. Motor: No weakness. Coordination: Coordination normal.   Psychiatric:         Behavior: Behavior normal.          MDM  Number of Diagnoses or Management Options  Stroke-like symptoms  Diagnosis management comments: Signed out for further management. ED EKG interpretation:  Rhythm: normal sinus rhythm; and regular . Rate (approx.): 115; Axis: normal; ST/T wave: normal; No STEMI    Critical care time 65 mins.             Procedures

## 2022-05-06 NOTE — PROCEDURES
ELECTROENCEPHALOGRAM REPORT     Patient Name: Ry Watson Sr.  : 1940  Age: 80 y.o. Ordering physician: Mk Leyva MD  Date of EEG: May 6th, 2022  Interpreting physician: Mk Leyva MD      PROCEDURE: Routine awake and drowsy video electroencephalogram (vEEG). CLINICAL INDICATION: The patient is a 80 y.o. male who is being evaluated for etiology of an atypical paroxysmal spell. DESCRIPTION OF THE RECORD:   During wakefulness, there is a well-formed posterior dominant alpha rhythm composed of 8 to 9 Hz alpha with preserved anterior to posterior frequency/amplitude gradient. Transition to drowsiness is manifest by fragmentation of waking background with progressive increase in diffuse theta. No sleep architecture is noted. Reactivity is present temperamental stimuli. Single-lead ECG demonstrates normal sinus rhythm. No epileptiform discharges or electrographic seizures noted. INTERPRETATION:     Is a normal awake and drowsy routine vEEG.       Brannon Negrete MD

## 2022-05-06 NOTE — PROGRESS NOTES
OCCUPATIONAL THERAPY EVALUATION/DISCHARGE  Patient: Sea Higuera Sr. (80 y.o. male)  Date: 5/6/2022  Primary Diagnosis: Weakness [R53.1]  Slurred speech [R47.81]       Precautions:   Fall,Other (comment) (droplet+)    ASSESSMENT  Based on the objective data described below, the patient presents with overall good performance with self care and functional mobility following admission for stroke like symptoms but found to be COVID+. Pt currently has a cough but no SOB with activities. He was able to complete LB dressing following setup. He mobilized to the bathroom with supervision and was able to void bladder standing in the urinal for urinalysis. He mobilized in the room with PT and did well with tasks. He does not require OT services at this time. Recommend home with support when medically cleared. Current Level of Function (ADLs/self-care): supervision to mod I    Functional Outcome Measure: The patient scored 75 on the Barthel Index outcome measure which is indicative of 25% impairment with ADL activities. Other factors to consider for discharge: COVID +     PLAN :  Recommend with staff: UP with nursing support. OOB to chair 3 times daily, mobilize in the room. Encourage walking to the bathroom    Recommendation for discharge: (in order for the patient to meet his/her long term goals)  No skilled occupational therapy/ follow up rehabilitation needs identified at this time. This discharge recommendation:  Has been made in collaboration with the attending provider and/or case management    IF patient discharges home will need the following DME: none       SUBJECTIVE:   Patient stated I was so embarrassed.     OBJECTIVE DATA SUMMARY:   HISTORY:   Past Medical History:   Diagnosis Date    Arthritis     CAD (coronary artery disease)     coronary stent    Diabetes ([facility] [address]     iddm newly on insulin 3/2012    Hypertension     Liver disease      Past Surgical History:   Procedure Laterality Date  HX APPENDECTOMY      HX CHOLECYSTECTOMY      HX ORTHOPAEDIC      Rt. Knee cartlidge removal    HX TONSILLECTOMY      HX UROLOGICAL      Rt Orchiectomy    GA CARDIAC SURG PROCEDURE UNLIST  1997    stents    GA COLON CA SCRN NOT  [address] IND  10/29/2013         VASCULAR SURGERY PROCEDURE UNLIST      carotid artery endarterectomy       Prior Level of Function/Environment/Context: pt is independent at home. Expanded or extensive additional review of patient history:   Home Situation  Home Environment: Private residence  # Steps to Enter: 2  Rails to Enter: No (post used as handrail)  One/Two Story Residence: One story  Living Alone: Yes  Support Systems: Child(masha)  Patient Expects to be Discharged to[de-identified] Home with family assistance  Current DME Used/Available at Home: Walker, rolling,Shower chair,Grab bars  Tub or Shower Type: Shower    Hand dominance: Right    EXAMINATION OF PERFORMANCE DEFICITS:  Cognitive/Behavioral Status:  Neurologic State: Alert  Orientation Level: Oriented X4  Cognition: Follows commands  Perception: Appears intact  Perseveration: No perseveration noted  Safety/Judgement: Good awareness of safety precautions    Skin: see nursing notes    Edema: none noted    Hearing: Auditory  Auditory Impairment: None    Vision/Perceptual:                           Acuity: Within Defined Limits    Corrective Lenses: Glasses    Range of Motion:    AROM: Within functional limits  PROM: Within functional limits                      Strength:    Strength: Within functional limits                Coordination:  Coordination: Within functional limits  Fine Motor Skills-Upper: Right Intact; Left Intact    Gross Motor Skills-Upper: Right Intact; Left Intact    Tone & Sensation:    Tone: Normal  Sensation: Intact                      Balance:  Sitting: Intact  Standing: Intact    Functional Mobility and Transfers for ADLs:  Bed Mobility:  Supine to Sit: Modified independent  Sit to Supine: Modified independent    Transfers:  Sit to Stand: Supervision  Stand to Sit: Supervision  Bed to Chair: Supervision  Bathroom Mobility: Supervision/set up  Toilet Transfer : Supervision    ADL Assessment:  Feeding: Independent    Oral Facial Hygiene/Grooming: Supervision    Bathing: Setup    Upper Body Dressing: Supervision    Lower Body Dressing: Supervision    Toileting: Supervision                ADL Intervention and task modifications:     Dressing and toileting activities. He did well with all tasks. Cognitive Retraining  Safety/Judgement: Good awareness of safety precautions      Functional Measure:    Barthel Index:  Bathin  Bladder: 10  Bowels: 10  Groomin  Dressin  Feeding: 10  Mobility: 15 (limited only due to COVID isolation)  Stairs: 0 (unable to due to COVID isolation)  Toilet Use: 10  Transfer (Bed to Chair and Back): 10  Total: 75/100      The Barthel ADL Index: Guidelines  1. The index should be used as a record of what a patient does, not as a record of what a patient could do. 2. The main aim is to establish degree of independence from any help, physical or verbal, however minor and for whatever reason. 3. The need for supervision renders the patient not independent. 4. A patient's performance should be established using the best available evidence. Asking the patient, friends/relatives and nurses are the usual sources, but direct observation and common sense are also important. However direct testing is not needed. 5. Usually the patient's performance over the preceding 24-48 hours is important, but occasionally longer periods will be relevant. 6. Middle categories imply that the patient supplies over 50 per cent of the effort. 7. Use of aids to be independent is allowed.     Score Interpretation (from [address] [place] [address])    Independent   60-79 Minimally independent   40-59 Partially dependent   20-39 Very dependent   <20 Totally dependent     -Magdalena F.l., Barthel, DYnesW. (1965). Functional evaluation: the Barthel Index. [facility] ([address] (1997). The Barthel activities of daily living index: self-reporting versus actual performance in the old (> or = 75 years). Journal of [facility]4 [facility] 45(7), 14 [facility], J.[place], Ousmane Avila., Kimberly Tavera. (1999). Measuring the change in disability after inpatient rehabilitation; comparison of the responsiveness of the Barthel Index and Functional [place] Measure. Journal of Neurology, Neurosurgery, and Psychiatry, 66(4), 973-853. Shaheen Gilmore, N.J.A, FRANCISCO Mclean, & Mara Blankenship MGOLDY. (2004) Assessment of post-stroke quality of life in cost-effectiveness studies: The usefulness of the Barthel Index and the EuroQoL-5D. Quality of Life Research, 15, 426-30         Occupational Therapy Evaluation Charge Determination   History Examination Decision-Making   LOW Complexity : Brief history review  HIGH Complexity : 5 or more performance deficits relating to physical, cognitive , or psychosocial skils that result in activity limitations and / or participation restrictions HIGH Complexity : Patient presents with comorbidities that affect occupational performance. Signifigant modification of tasks or assistance (eg, physical or verbal) with assessment (s) is necessary to enable patient to complete evaluation       Based on the above components, the patient evaluation is determined to be of the following complexity level: LOW   Pain Rating:  No pain    Activity Tolerance:   Good    After treatment patient left in no apparent distress:    Sitting in chair and Call bell within reach    COMMUNICATION/EDUCATION:   The patients plan of care was discussed with: Physical therapist and Registered nurse.      Thank you for this referral.  Loraine Rockwell OT  Time Calculation: 25 mins

## 2022-05-06 NOTE — ED NOTES
Pt. Signed out to me by Dr. Cintia Freeman to f/u on his CTs and chemistry. She had spoken with teleneuro, who recommended admission for possible stroke v. Seizure. Perfect Serve Consult for Admission  7:21 AM    ED Room Number: RJ27/89  Patient Name and age:  Zulema Dorsey. 80 y.o.  male  Working Diagnosis:   1. Stroke-like symptoms        COVID-19 Suspicion:  no  Sepsis present:  no  Reassessment needed: no  Code Status:  Full Code  Readmission: no  Isolation Requirements:  no  Recommended Level of Care:  telemetry  Department:[facility] Adult ED - 21   Other:  Stroke v. Seizure.

## 2022-05-07 ENCOUNTER — APPOINTMENT (OUTPATIENT)
Dept: GENERAL RADIOLOGY | Age: 82
DRG: 179 | End: 2022-05-07
Attending: INTERNAL MEDICINE
Payer: MEDICARE

## 2022-05-07 PROBLEM — U07.1 COVID-19: Status: ACTIVE | Noted: 2022-05-07

## 2022-05-07 LAB
ANION GAP SERPL CALC-SCNC: 7 MMOL/L (ref 5–15)
BUN SERPL-MCNC: 41 MG/DL (ref 6–20)
BUN/CREAT SERPL: 21 (ref 12–20)
CALCIUM SERPL-MCNC: 9 MG/DL (ref 8.5–10.1)
CHLORIDE SERPL-SCNC: 106 MMOL/L (ref 97–108)
CO2 SERPL-SCNC: 21 MMOL/L (ref 21–32)
CREAT SERPL-MCNC: 1.93 MG/DL (ref 0.7–1.3)
CRP SERPL-MCNC: 9.46 MG/DL (ref 0–0.6)
D DIMER PPP FEU-MCNC: 1.25 MG/L FEU (ref 0–0.65)
ERYTHROCYTE [DISTWIDTH] IN BLOOD BY AUTOMATED COUNT: 12.8 % (ref 11.5–14.5)
GLUCOSE BLD STRIP.AUTO-MCNC: 130 MG/DL (ref 65–117)
GLUCOSE BLD STRIP.AUTO-MCNC: 205 MG/DL (ref 65–117)
GLUCOSE SERPL-MCNC: 127 MG/DL (ref 65–100)
HCT VFR BLD AUTO: 45.4 % (ref 36.6–50.3)
HGB BLD-MCNC: 14.8 G/DL (ref 12.1–17)
MCH RBC QN AUTO: 30.7 PG (ref 26–34)
MCHC RBC AUTO-ENTMCNC: 32.6 G/DL (ref 30–36.5)
MCV RBC AUTO: 94.2 FL (ref 80–99)
NRBC # BLD: 0 K/UL (ref 0–0.01)
NRBC BLD-RTO: 0 PER 100 WBC
PLATELET # BLD AUTO: 118 K/UL (ref 150–400)
PMV BLD AUTO: 10.1 FL (ref 8.9–12.9)
POTASSIUM SERPL-SCNC: 5 MMOL/L (ref 3.5–5.1)
POTASSIUM SERPL-SCNC: 5.4 MMOL/L (ref 3.5–5.1)
RBC # BLD AUTO: 4.82 M/UL (ref 4.1–5.7)
SERVICE CMNT-IMP: ABNORMAL
SERVICE CMNT-IMP: ABNORMAL
SODIUM SERPL-SCNC: 134 MMOL/L (ref 136–145)
UR CULT HOLD, URHOLD: NORMAL
WBC # BLD AUTO: 11.4 K/UL (ref 4.1–11.1)

## 2022-05-07 PROCEDURE — 36415 COLL VENOUS BLD VENIPUNCTURE: CPT

## 2022-05-07 PROCEDURE — 74011250637 HC RX REV CODE- 250/637: Performed by: NURSE PRACTITIONER

## 2022-05-07 PROCEDURE — 86140 C-REACTIVE PROTEIN: CPT

## 2022-05-07 PROCEDURE — 87186 SC STD MICRODIL/AGAR DIL: CPT

## 2022-05-07 PROCEDURE — 87077 CULTURE AEROBIC IDENTIFY: CPT

## 2022-05-07 PROCEDURE — 87449 NOS EACH ORGANISM AG IA: CPT

## 2022-05-07 PROCEDURE — 74011250636 HC RX REV CODE- 250/636: Performed by: INTERNAL MEDICINE

## 2022-05-07 PROCEDURE — 87899 AGENT NOS ASSAY W/OPTIC: CPT

## 2022-05-07 PROCEDURE — G0378 HOSPITAL OBSERVATION PER HR: HCPCS

## 2022-05-07 PROCEDURE — 96372 THER/PROPH/DIAG INJ SC/IM: CPT

## 2022-05-07 PROCEDURE — 80048 BASIC METABOLIC PNL TOTAL CA: CPT

## 2022-05-07 PROCEDURE — 87070 CULTURE OTHR SPECIMN AEROBIC: CPT

## 2022-05-07 PROCEDURE — 82962 GLUCOSE BLOOD TEST: CPT

## 2022-05-07 PROCEDURE — 84132 ASSAY OF SERUM POTASSIUM: CPT

## 2022-05-07 PROCEDURE — 74011250637 HC RX REV CODE- 250/637: Performed by: INTERNAL MEDICINE

## 2022-05-07 PROCEDURE — XW0DXM6 INTRODUCTION OF BARICITINIB INTO MOUTH AND PHARYNX, EXTERNAL APPROACH, NEW TECHNOLOGY GROUP 6: ICD-10-PCS | Performed by: INTERNAL MEDICINE

## 2022-05-07 PROCEDURE — 71045 X-RAY EXAM CHEST 1 VIEW: CPT

## 2022-05-07 PROCEDURE — 85027 COMPLETE CBC AUTOMATED: CPT

## 2022-05-07 PROCEDURE — 85379 FIBRIN DEGRADATION QUANT: CPT

## 2022-05-07 PROCEDURE — 65270000046 HC RM TELEMETRY

## 2022-05-07 PROCEDURE — 74011250637 HC RX REV CODE- 250/637: Performed by: FAMILY MEDICINE

## 2022-05-07 RX ORDER — GABAPENTIN 600 MG/1
300 TABLET ORAL 2 TIMES DAILY
Status: DISCONTINUED | OUTPATIENT
Start: 2022-05-07 | End: 2022-05-08 | Stop reason: HOSPADM

## 2022-05-07 RX ORDER — ENOXAPARIN SODIUM 100 MG/ML
30 INJECTION SUBCUTANEOUS EVERY 24 HOURS
Status: DISCONTINUED | OUTPATIENT
Start: 2022-05-07 | End: 2022-05-08 | Stop reason: HOSPADM

## 2022-05-07 RX ORDER — ROSUVASTATIN CALCIUM 10 MG/1
10 TABLET, COATED ORAL
Status: DISCONTINUED | OUTPATIENT
Start: 2022-05-07 | End: 2022-05-08 | Stop reason: HOSPADM

## 2022-05-07 RX ADMIN — BENZONATATE 100 MG: 100 CAPSULE ORAL at 09:55

## 2022-05-07 RX ADMIN — ASPIRIN 81 MG 81 MG: 81 TABLET ORAL at 09:45

## 2022-05-07 RX ADMIN — DEXAMETHASONE 6 MG: 1 TABLET ORAL at 09:45

## 2022-05-07 RX ADMIN — GABAPENTIN 300 MG: 600 TABLET, FILM COATED ORAL at 17:05

## 2022-05-07 RX ADMIN — ACETAMINOPHEN 650 MG: 325 TABLET ORAL at 00:34

## 2022-05-07 RX ADMIN — Medication 1 SPRAY: at 09:47

## 2022-05-07 RX ADMIN — TAMSULOSIN HYDROCHLORIDE 0.4 MG: 0.4 CAPSULE ORAL at 22:51

## 2022-05-07 RX ADMIN — BARICITINIB 2 MG: 2 TABLET, FILM COATED ORAL at 09:55

## 2022-05-07 RX ADMIN — ROSUVASTATIN 10 MG: 10 TABLET, FILM COATED ORAL at 22:51

## 2022-05-07 RX ADMIN — CLOPIDOGREL BISULFATE 75 MG: 75 TABLET ORAL at 09:45

## 2022-05-07 RX ADMIN — LISINOPRIL 10 MG: 10 TABLET ORAL at 09:45

## 2022-05-07 RX ADMIN — GABAPENTIN 600 MG: 600 TABLET, FILM COATED ORAL at 09:45

## 2022-05-07 RX ADMIN — ENOXAPARIN SODIUM 30 MG: 100 INJECTION SUBCUTANEOUS at 09:46

## 2022-05-07 RX ADMIN — FINASTERIDE 5 MG: 5 TABLET, FILM COATED ORAL at 09:45

## 2022-05-07 NOTE — PROGRESS NOTES
Hospitalist Progress Note      Hospital summary: Keaton Stuart is a 80 y.o. male who presents with weakness and slurred speech earlier this am- now resolved. He has a phx of arthritis, CAD with stent, DM, HTN, liver disease who p/w code stroke level 2 activation in the field. Pt reports he had a cough and cold that started a few days ago - he woke up at 3 AM and had defecated on himself. His 'body just gave out' and he was not able to move and lay in bed for two hours. He could also hear himself speak and it sounded slurred. Patient describes his weakness to be mostly bilateral affecting the arms and legs -but reports increased left leg pain and weakness that he attributes to vascular disease . He is due to see his vascular surgeon Dr. Oracio Soto soon. He did try to stand up this morning and was unable to stand and tells me that he hit the floor but did not hit his head . After few hours at approximately  5 AM he was able to call his friend who then called EMS. Pt is currently asymptomatic. His neurological exam is unremarkable -family at the bedside reports no speech issues and no changes in his normal mental status -I tried to review his home medication list-patient was able to confirm most of his home medications but no longer takes insulin-he does continue to take aspirin and Plavix his last doses were yesterday morning-he does report one episode of loose stool-no difficulty swallowing no chest pain or shortness of breath-does have a mild dry cough on exam- 5/6/2022      Assessment/Plan:  Weakness with slurred speech- now resolved  Likely due to metabolic event - hypoglycemia   - low suspicion for TIA and seizure. - CT head: No acute intracranial hemorrhage, mass or infarct. Stable pattern of atrophy and chronic white matter change most compatible with small vessel  ischemic and/or senescent change. Intracranial atherosclerosis.   - CTA head: No evidence for acute large vessel arterial occlusion  - appreciate neurology input  - EEG: normal awake and drowsy routine vEEG  - c/w aspirin and statin      COVID infection  Mild hypoxia  - elevated CRP from 1.8 to 9.46  - he was on 2l NC last night  - CXR: no acute process  - started on po decadron and Barcitinib on 5/7  - saturating on 2l NC, try to wean off     Peripheral vascular disease -continue aspirin Plavix  Chronic kidney disease stage III - cr  at baseline  Arthritis with peripheral neuropathy- c/w gabapentin  BPH- c/w Proscar and Flomax  Hyperlipidemia-c/w home statin medication  Diabetes- Mild hypoglycemia on admission-dextrose given. C/w ISS    Code status: Full  DVT prophylaxis: Lovenox   Disposition: TBD. Home in 1-2 days   ----------------------------------------------    CC: Weakness     S: Patient is seen and examined at bedside this AM.  He feels ok. He does have productive cough. Explained regarding covid infection and treatment with steroids and barcitinib. Discussed with nursing     Spoke with daughter Connie Pereira on phone and updated patient's status - weakness resolved, low suspicion for CVA or seizure , neurology considers as metabolic event due to low blood glucose. desaturated overnight, saturated steroids and barcitinib, possible dc tomorrow if  Weaned off oxygen     Review of Systems:  A comprehensive review of systems was negative.     O:  Visit Vitals  /60 (BP 1 Location: Right upper arm, BP Patient Position: At rest)   Pulse 100   Temp 98.1 °F (36.7 °C)   Resp 17   Ht 5' 8\" (1.727 m)   Wt 79.8 kg (176 lb)   SpO2 94%   BMI 26.76 kg/m²       PHYSICAL EXAM:  Gen: NAD  HEENT: anicteric sclerae, normal conjunctiva, oropharynx clear, MM moist  Neck: supple, trachea midline, no adenopathy  Heart: RRR, no MRG, no JVD, no peripheral edema  Lungs: CTA b/l, non-labored respirations  Abd: soft, NT, ND, BS+, no organomegaly  Extr: warm  Skin: dry, no rash  Neuro: CN II-XII grossly intact, normal speech, moves all extremities  Psych: normal mood, appropriate affect      Intake/Output Summary (Last 24 hours) at 5/7/2022 1358  Last data filed at 5/6/2022 2203  Gross per 24 hour   Intake    Output 300 ml   Net -300 ml        Recent labs & imaging reviewed:  Recent Results (from the past 24 hour(s))   C REACTIVE PROTEIN, QT    Collection Time: 05/06/22  2:56 PM   Result Value Ref Range    C-Reactive protein 3.31 (H) 0.00 - 0.60 mg/dL   D DIMER    Collection Time: 05/06/22  2:56 PM   Result Value Ref Range    D-dimer 1.35 (H) 0.00 - 0.65 mg/L FEU   TROPONIN-HIGH SENSITIVITY    Collection Time: 05/06/22  2:56 PM   Result Value Ref Range    Troponin-High Sensitivity 30 0 - 76 ng/L   VITAMIN D, 25 HYDROXY    Collection Time: 05/06/22  2:56 PM   Result Value Ref Range    Vitamin D 25-Hydroxy 36.8 30 - 100 ng/mL   PROCALCITONIN    Collection Time: 05/06/22  2:56 PM   Result Value Ref Range    Procalcitonin 0.13 ng/mL   URINALYSIS W/MICROSCOPIC    Collection Time: 05/06/22  2:56 PM   Result Value Ref Range    Color YELLOW/STRAW      Appearance CLEAR CLEAR      Specific gravity 1.015 1.003 - 1.030      pH (UA) 5.0 5.0 - 8.0      Protein 30 (A) NEG mg/dL    Glucose 500 (A) NEG mg/dL    Ketone Negative NEG mg/dL    Bilirubin Negative NEG      Blood TRACE (A) NEG      Urobilinogen 0.2 0.2 - 1.0 EU/dL    Nitrites Negative NEG      Leukocyte Esterase Negative NEG      WBC 0-4 0 - 4 /hpf    RBC 0-5 0 - 5 /hpf    Epithelial cells FEW FEW /lpf    Bacteria Negative NEG /hpf    Hyaline cast 0-2 0 - 5 /lpf   C REACTIVE PROTEIN, QT    Collection Time: 05/07/22  6:23 AM   Result Value Ref Range    C-Reactive protein 9.46 (H) 0.00 - 0.60 mg/dL   D DIMER    Collection Time: 05/07/22  6:23 AM   Result Value Ref Range    D-dimer 1.25 (H) 0.00 - 0.65 mg/L FEU   CBC W/O DIFF    Collection Time: 05/07/22  6:23 AM   Result Value Ref Range    WBC 11.4 (H) 4.1 - 11.1 K/uL    RBC 4.82 4.10 - 5.70 M/uL    HGB 14.8 12.1 - 17.0 g/dL    HCT 45.4 36.6 - 50.3 % MCV 94.2 80.0 - 99.0 FL    MCH 30.7 26.0 - 34.0 PG    MCHC 32.6 30.0 - 36.5 g/dL    RDW 12.8 11.5 - 14.5 %    PLATELET 652 (L) 817 - 400 K/uL    MPV 10.1 8.9 - 12.9 FL    NRBC 0.0 0  WBC    ABSOLUTE NRBC 0.00 0.00 - 0.71 K/uL   METABOLIC PANEL, BASIC    Collection Time: 05/07/22  6:23 AM   Result Value Ref Range    Sodium 134 (L) 136 - 145 mmol/L    Potassium 5.4 (H) 3.5 - 5.1 mmol/L    Chloride 106 97 - 108 mmol/L    CO2 21 21 - 32 mmol/L    Anion gap 7 5 - 15 mmol/L    Glucose 127 (H) 65 - 100 mg/dL    BUN 41 (H) 6 - 20 MG/DL    Creatinine 1.93 (H) 0.70 - 1.30 MG/DL    BUN/Creatinine ratio 21 (H) 12 - 20      GFR est AA 41 (L) >60 ml/min/1.73m2    GFR est non-AA 33 (L) >60 ml/min/1.73m2    Calcium 9.0 8.5 - 10.1 MG/DL   GLUCOSE, POC    Collection Time: 05/07/22  6:24 AM   Result Value Ref Range    Glucose (POC) 130 (H) 65 - 117 mg/dL    Performed by Tony Armstrong LPN    POTASSIUM    Collection Time: 05/07/22  9:49 AM   Result Value Ref Range    Potassium 5.0 3.5 - 5.1 mmol/L   GLUCOSE, POC    Collection Time: 05/07/22  1:01 PM   Result Value Ref Range    Glucose (POC) 205 (H) 65 - 117 mg/dL    Performed by Spencer BARNES      Recent Labs     05/07/22  0623 05/06/22  0635   WBC 11.4* 8.8   HGB 14.8 13.3   HCT 45.4 39.2   * 106*     Recent Labs     05/07/22  0949 05/07/22  0623 05/06/22  0635   NA  --  134* 137   K 5.0 5.4* 4.8   CL  --  106 108   CO2  --  21 23   BUN  --  41* 41*   CREA  --  1.93* 1.87*   GLU  --  127* 61*   CA  --  9.0 8.9     Recent Labs     05/06/22  0635   ALT 30   AP 56   TBILI 0.8   TP 7.8   ALB 3.6   GLOB 4.2*     Recent Labs     05/06/22  0635   INR 1.1   PTP 11.1      No results for input(s): FE, TIBC, PSAT, FERR in the last 72 hours. No results found for: FOL, RBCF   No results for input(s): PH, PCO2, PO2 in the last 72 hours. No results for input(s): CPK, CKNDX, TROIQ in the last 72 hours.     No lab exists for component: CPKMB  Lab Results   Component Value Date/Time    Cholesterol, total 116 05/06/2022 06:35 AM    HDL Cholesterol 48 05/06/2022 06:35 AM    LDL,Direct 50 12/28/2021 03:34 AM    LDL, calculated 39.2 05/06/2022 06:35 AM    Triglyceride 144 05/06/2022 06:35 AM    CHOL/HDL Ratio 2.4 05/06/2022 06:35 AM     Lab Results   Component Value Date/Time    Glucose (POC) 205 (H) 05/07/2022 01:01 PM    Glucose (POC) 130 (H) 05/07/2022 06:24 AM    Glucose (POC) 140 (H) 05/06/2022 07:20 AM    Glucose (POC) 63 (L) 05/06/2022 06:27 AM    Glucose (POC) 185 (H) 01/04/2022 03:24 PM     Lab Results   Component Value Date/Time    Color YELLOW/STRAW 05/06/2022 02:56 PM    Appearance CLEAR 05/06/2022 02:56 PM    Specific gravity 1.015 05/06/2022 02:56 PM    Specific gravity 1.009 02/16/2017 11:05 AM    pH (UA) 5.0 05/06/2022 02:56 PM    Protein 30 (A) 05/06/2022 02:56 PM    Glucose 500 (A) 05/06/2022 02:56 PM    Ketone Negative 05/06/2022 02:56 PM    Bilirubin Negative 05/06/2022 02:56 PM    Urobilinogen 0.2 05/06/2022 02:56 PM    Nitrites Negative 05/06/2022 02:56 PM    Leukocyte Esterase Negative 05/06/2022 02:56 PM    Epithelial cells FEW 05/06/2022 02:56 PM    Bacteria Negative 05/06/2022 02:56 PM    WBC 0-4 05/06/2022 02:56 PM    RBC 0-5 05/06/2022 02:56 PM       Med list reviewed  Current Facility-Administered Medications   Medication Dose Route Frequency    enoxaparin (LOVENOX) injection 30 mg  30 mg SubCUTAneous Q24H    dexAMETHasone (DECADRON) tablet 6 mg  6 mg Oral DAILY    baricitinib (OLUMIANT) tablet 2 mg  2 mg Oral DAILY    rosuvastatin (CRESTOR) tablet 10 mg  10 mg Oral QHS    gabapentin (NEURONTIN) tablet 300 mg  300 mg Oral BID    dextrose 10 % infusion 250 mL  250 mL IntraVENous PRN    acetaminophen (TYLENOL) tablet 650 mg  650 mg Oral Q4H PRN    Or    acetaminophen (TYLENOL) solution 650 mg  650 mg Per NG tube Q4H PRN    Or    acetaminophen (TYLENOL) suppository 650 mg  650 mg Rectal Q4H PRN    aspirin chewable tablet 81 mg  81 mg Oral DAILY    clopidogreL (PLAVIX) tablet 75 mg  75 mg Oral DAILY    finasteride (PROSCAR) tablet 5 mg  5 mg Oral DAILY    tamsulosin (FLOMAX) capsule 0.4 mg  0.4 mg Oral QHS    lisinopriL (PRINIVIL, ZESTRIL) tablet 10 mg  10 mg Oral DAILY    benzonatate (TESSALON) capsule 100 mg  100 mg Oral TID PRN    phenol throat spray (CHLORASEPTIC) 1 Spray  1 Spray Oral PRN    hydrALAZINE (APRESOLINE) 20 mg/mL injection 20 mg  20 mg IntraVENous Q6H PRN       Care Plan discussed with:  Patient/Family and Nurse    Deanna Rodrigues MD  Internal Medicine  Date of Service: 5/7/2022

## 2022-05-07 NOTE — PROGRESS NOTES
Renal Dosing/Monitoring  Medication: Rosuvastatin   Current regimen:  20 mg PO every bedtime  Recent Labs     05/07/22  0623 05/06/22  0635   CREA 1.93* 1.87*   BUN 41* 41*     Estimated CrCl:  <30 ml/min  Plan: Change to 10 mg PO every bedtime per [facility] P&T Committee Protocol with respect to renal function. Pharmacy will continue to monitor patient daily and will make dosage adjustments based upon changing renal function.

## 2022-05-07 NOTE — PROGRESS NOTES
Problem: Airway Clearance - Ineffective  Goal: Achieve or maintain patent airway  Outcome: Progressing Towards Goal     Problem: Gas Exchange - Impaired  Goal: Absence of hypoxia  Outcome: Progressing Towards Goal  Goal: Promote optimal lung function  Outcome: Progressing Towards Goal     Problem: Breathing Pattern - Ineffective  Goal: Ability to achieve and maintain a regular respiratory rate  Outcome: Progressing Towards Goal     Problem:  Body Temperature -  Risk of, Imbalanced  Goal: Ability to maintain a body temperature within defined limits  Outcome: Progressing Towards Goal  Goal: Will regain or maintain usual level of consciousness  Outcome: Progressing Towards Goal  Goal: Complications related to the disease process, condition or treatment will be avoided or minimized  Outcome: Progressing Towards Goal     Problem: Isolation Precautions - Risk of Spread of Infection  Goal: Prevent transmission of infectious organism to others  Outcome: Progressing Towards Goal     Problem: Nutrition Deficits  Goal: Optimize nutrtional status  Outcome: Progressing Towards Goal     Problem: Risk for Fluid Volume Deficit  Goal: Maintain normal heart rhythm  Outcome: Progressing Towards Goal  Goal: Maintain absence of muscle cramping  Outcome: Progressing Towards Goal  Goal: Maintain normal serum potassium, sodium, calcium, phosphorus, and pH  Outcome: Progressing Towards Goal     Problem: Loneliness or Risk for Loneliness  Goal: Demonstrate positive use of time alone when socialization is not possible  Outcome: Progressing Towards Goal     Problem: Fatigue  Goal: Verbalize increase energy and improved vitality  Outcome: Progressing Towards Goal     Problem: Patient Education: Go to Patient Education Activity  Goal: Patient/Family Education  Outcome: Progressing Towards Goal     Problem: Discharge Planning  Goal: *Discharge to safe environment  Outcome: Progressing Towards Goal     Problem: Falls - Risk of  Goal: *Absence of Falls  Description: Document Lovebrittany Lott Fall Risk and appropriate interventions in the flowsheet.   Outcome: Progressing Towards Goal  Note: Fall Risk Interventions:  Mobility Interventions: Bed/chair exit alarm         Medication Interventions: Bed/chair exit alarm    Elimination Interventions: Call light in reach              Problem: Patient Education: Go to Patient Education Activity  Goal: Patient/Family Education  Outcome: Progressing Towards Goal

## 2022-05-07 NOTE — PROGRESS NOTES
Renal Dosing/Monitoring  Medication: Gabapentin   Current regimen:  600 mg PO 2 times daily  Recent Labs     05/07/22  0623 05/06/22  0635   CREA 1.93* 1.87*   BUN 41* 41*     Estimated CrCl:  ~25-30 ml/min  Plan: Change to 300 mg PO 2 times daily per [facility] P&T Committee Protocol with respect to renal function. Pharmacy will continue to monitor patient daily and will make dosage adjustments based upon changing renal function.

## 2022-05-07 NOTE — PROGRESS NOTES
Problem: Airway Clearance - Ineffective  Goal: Achieve or maintain patent airway  5/7/2022 0709 by Tahir Orozco RN  Outcome: Progressing Towards Goal  5/7/2022 0709 by Tahir Orozco RN  Outcome: Progressing Towards Goal     Problem: Gas Exchange - Impaired  Goal: Absence of hypoxia  5/7/2022 0709 by Tahir Orozco RN  Outcome: Progressing Towards Goal  5/7/2022 0709 by Tahir Orozco RN  Outcome: Progressing Towards Goal  Goal: Promote optimal lung function  5/7/2022 0709 by Tahir Orozco RN  Outcome: Progressing Towards Goal  5/7/2022 0709 by Tahir Orozco RN  Outcome: Progressing Towards Goal     Problem: Breathing Pattern - Ineffective  Goal: Ability to achieve and maintain a regular respiratory rate  5/7/2022 0709 by Tahir Orozco RN  Outcome: Progressing Towards Goal  5/7/2022 0709 by Tahir Orozco RN  Outcome: Progressing Towards Goal     Problem:  Body Temperature -  Risk of, Imbalanced  Goal: Ability to maintain a body temperature within defined limits  5/7/2022 0709 by Tahir Orozco RN  Outcome: Progressing Towards Goal  5/7/2022 0709 by Tahir Orozco RN  Outcome: Progressing Towards Goal  Goal: Will regain or maintain usual level of consciousness  5/7/2022 0709 by Tahir Orozco RN  Outcome: Progressing Towards Goal  5/7/2022 0709 by Tahir Orozco RN  Outcome: Progressing Towards Goal  Goal: Complications related to the disease process, condition or treatment will be avoided or minimized  5/7/2022 0709 by Tahir Orozco RN  Outcome: Progressing Towards Goal  5/7/2022 0709 by Tahir Orozco RN  Outcome: Progressing Towards Goal     Problem: Isolation Precautions - Risk of Spread of Infection  Goal: Prevent transmission of infectious organism to others  5/7/2022 0709 by Tahir Orozco RN  Outcome: Progressing Towards Goal  5/7/2022 0709 by Tahir Orozco RN  Outcome: Progressing Towards Goal     Problem: Nutrition Deficits  Goal: Optimize nutrtional status  5/7/2022 0709 by Freddy Lance RN  Outcome: Progressing Towards Goal  5/7/2022 0709 by Freddy Lance RN  Outcome: Progressing Towards Goal     Problem: Risk for Fluid Volume Deficit  Goal: Maintain normal heart rhythm  5/7/2022 0709 by Freddy Lance RN  Outcome: Progressing Towards Goal  5/7/2022 0709 by Freddy Lance RN  Outcome: Progressing Towards Goal  Goal: Maintain absence of muscle cramping  5/7/2022 0709 by Freddy Lance RN  Outcome: Progressing Towards Goal  5/7/2022 0709 by Freddy Lance RN  Outcome: Progressing Towards Goal  Goal: Maintain normal serum potassium, sodium, calcium, phosphorus, and pH  5/7/2022 0709 by Freddy Lance RN  Outcome: Progressing Towards Goal  5/7/2022 0709 by Freddy Lance RN  Outcome: Progressing Towards Goal     Problem: Loneliness or Risk for Loneliness  Goal: Demonstrate positive use of time alone when socialization is not possible  5/7/2022 0709 by Freddy Lance RN  Outcome: Progressing Towards Goal  5/7/2022 0709 by Freddy Lance RN  Outcome: Progressing Towards Goal     Problem: Fatigue  Goal: Verbalize increase energy and improved vitality  5/7/2022 0709 by Freddy Lance RN  Outcome: Progressing Towards Goal  5/7/2022 0709 by Freddy Lance RN  Outcome: Progressing Towards Goal     Problem: Patient Education: Go to Patient Education Activity  Goal: Patient/Family Education  5/7/2022 0709 by Freddy Lance RN  Outcome: Progressing Towards Goal  5/7/2022 0709 by Freddy Lance RN  Outcome: Progressing Towards Goal     Problem: Discharge Planning  Goal: *Discharge to safe environment  5/7/2022 0709 by Freddy Lance RN  Outcome: Progressing Towards Goal  5/7/2022 0709 by Freddy Lance RN  Outcome: Progressing Towards Goal     Problem: Falls - Risk of  Goal: *Absence of Falls  Description: Document Romulo Perkins Fall Risk and appropriate interventions in the flowsheet.   5/7/2022 1009 by Antolin Sue RN  Outcome: Progressing Towards Goal  Note: Fall Risk Interventions:            Medication Interventions: Evaluate medications/consider consulting pharmacy    Elimination Interventions: Call light in reach           5/7/2022 0709 by Antolin Sue RN  Outcome: Progressing Towards Goal  Note: Fall Risk Interventions:            Medication Interventions: Evaluate medications/consider consulting pharmacy    Elimination Interventions: Call light in reach              Problem: Patient Education: Go to Patient Education Activity  Goal: Patient/Family Education  5/7/2022 0709 by Antolin Sue RN  Outcome: Progressing Towards Goal  5/7/2022 0709 by Antolin Sue RN  Outcome: Progressing Towards Goal

## 2022-05-07 NOTE — PROGRESS NOTES
Bedside shift change report given to Yasmeen Fletcher RN (oncoming nurse) by Mildred Simms RN (offgoing nurse).  Report included the following information SBAR, Kardex, MAR and Cardiac Rhythm ST   .

## 2022-05-08 VITALS
HEART RATE: 93 BPM | RESPIRATION RATE: 19 BRPM | DIASTOLIC BLOOD PRESSURE: 71 MMHG | TEMPERATURE: 97.6 F | OXYGEN SATURATION: 95 % | WEIGHT: 176 LBS | HEIGHT: 68 IN | SYSTOLIC BLOOD PRESSURE: 155 MMHG | BODY MASS INDEX: 26.67 KG/M2

## 2022-05-08 LAB
ANION GAP SERPL CALC-SCNC: 8 MMOL/L (ref 5–15)
BUN SERPL-MCNC: 54 MG/DL (ref 6–20)
BUN/CREAT SERPL: 29 (ref 12–20)
CALCIUM SERPL-MCNC: 8.3 MG/DL (ref 8.5–10.1)
CHLORIDE SERPL-SCNC: 105 MMOL/L (ref 97–108)
CO2 SERPL-SCNC: 17 MMOL/L (ref 21–32)
CREAT SERPL-MCNC: 1.85 MG/DL (ref 0.7–1.3)
CRP SERPL-MCNC: 13.4 MG/DL (ref 0–0.6)
ERYTHROCYTE [DISTWIDTH] IN BLOOD BY AUTOMATED COUNT: 12.8 % (ref 11.5–14.5)
GLUCOSE BLD STRIP.AUTO-MCNC: 189 MG/DL (ref 65–117)
GLUCOSE BLD STRIP.AUTO-MCNC: 250 MG/DL (ref 65–117)
GLUCOSE BLD STRIP.AUTO-MCNC: 333 MG/DL (ref 65–117)
GLUCOSE BLD STRIP.AUTO-MCNC: 354 MG/DL (ref 65–117)
GLUCOSE SERPL-MCNC: 350 MG/DL (ref 65–100)
HCT VFR BLD AUTO: 36.7 % (ref 36.6–50.3)
HGB BLD-MCNC: 12.3 G/DL (ref 12.1–17)
MCH RBC QN AUTO: 30.3 PG (ref 26–34)
MCHC RBC AUTO-ENTMCNC: 33.5 G/DL (ref 30–36.5)
MCV RBC AUTO: 90.4 FL (ref 80–99)
NRBC # BLD: 0 K/UL (ref 0–0.01)
NRBC BLD-RTO: 0 PER 100 WBC
PLATELET # BLD AUTO: 129 K/UL (ref 150–400)
PMV BLD AUTO: 10.6 FL (ref 8.9–12.9)
POTASSIUM SERPL-SCNC: 5.1 MMOL/L (ref 3.5–5.1)
RBC # BLD AUTO: 4.06 M/UL (ref 4.1–5.7)
SERVICE CMNT-IMP: ABNORMAL
SODIUM SERPL-SCNC: 130 MMOL/L (ref 136–145)
WBC # BLD AUTO: 4.9 K/UL (ref 4.1–11.1)

## 2022-05-08 PROCEDURE — 85027 COMPLETE CBC AUTOMATED: CPT

## 2022-05-08 PROCEDURE — 74011250637 HC RX REV CODE- 250/637: Performed by: FAMILY MEDICINE

## 2022-05-08 PROCEDURE — 36415 COLL VENOUS BLD VENIPUNCTURE: CPT

## 2022-05-08 PROCEDURE — 80048 BASIC METABOLIC PNL TOTAL CA: CPT

## 2022-05-08 PROCEDURE — 86140 C-REACTIVE PROTEIN: CPT

## 2022-05-08 PROCEDURE — 94762 N-INVAS EAR/PLS OXIMTRY CONT: CPT

## 2022-05-08 PROCEDURE — 74011250637 HC RX REV CODE- 250/637: Performed by: INTERNAL MEDICINE

## 2022-05-08 PROCEDURE — 94760 N-INVAS EAR/PLS OXIMETRY 1: CPT

## 2022-05-08 PROCEDURE — 74011250636 HC RX REV CODE- 250/636: Performed by: INTERNAL MEDICINE

## 2022-05-08 PROCEDURE — 74011636637 HC RX REV CODE- 636/637: Performed by: NURSE PRACTITIONER

## 2022-05-08 PROCEDURE — 82962 GLUCOSE BLOOD TEST: CPT

## 2022-05-08 RX ORDER — ATENOLOL 100 MG/1
TABLET ORAL
COMMUNITY
End: 2022-05-08

## 2022-05-08 RX ORDER — ACARBOSE 25 MG/1
TABLET ORAL
COMMUNITY

## 2022-05-08 RX ORDER — AMLODIPINE BESYLATE 5 MG/1
TABLET ORAL
COMMUNITY
End: 2022-05-08

## 2022-05-08 RX ORDER — LISINOPRIL 40 MG/1
TABLET ORAL
Status: ON HOLD | COMMUNITY
End: 2022-05-08 | Stop reason: SDUPTHER

## 2022-05-08 RX ORDER — INSULIN LISPRO 100 [IU]/ML
INJECTION, SOLUTION INTRAVENOUS; SUBCUTANEOUS
Status: DISCONTINUED | OUTPATIENT
Start: 2022-05-08 | End: 2022-05-08 | Stop reason: HOSPADM

## 2022-05-08 RX ORDER — LISINOPRIL 10 MG/1
10 TABLET ORAL DAILY
Qty: 30 TABLET | Refills: 0 | Status: SHIPPED
Start: 2022-05-08

## 2022-05-08 RX ORDER — BENZONATATE 100 MG/1
100 CAPSULE ORAL
Qty: 30 CAPSULE | Refills: 0 | Status: SHIPPED | OUTPATIENT
Start: 2022-05-08 | End: 2022-05-18

## 2022-05-08 RX ORDER — GLIPIZIDE 10 MG/1
TABLET, FILM COATED, EXTENDED RELEASE ORAL
COMMUNITY
Start: 2022-03-19

## 2022-05-08 RX ORDER — DEXAMETHASONE 6 MG/1
6 TABLET ORAL
Qty: 8 TABLET | Refills: 0 | Status: SHIPPED | OUTPATIENT
Start: 2022-05-08 | End: 2022-05-16

## 2022-05-08 RX ORDER — HYDROCHLOROTHIAZIDE 25 MG/1
TABLET ORAL
COMMUNITY
End: 2022-05-08

## 2022-05-08 RX ORDER — MAGNESIUM SULFATE 100 %
4 CRYSTALS MISCELLANEOUS AS NEEDED
Status: DISCONTINUED | OUTPATIENT
Start: 2022-05-08 | End: 2022-05-08 | Stop reason: HOSPADM

## 2022-05-08 RX ORDER — METFORMIN HYDROCHLORIDE 500 MG/1
TABLET, EXTENDED RELEASE ORAL
COMMUNITY

## 2022-05-08 RX ORDER — INSULIN LISPRO 100 [IU]/ML
4 INJECTION, SOLUTION INTRAVENOUS; SUBCUTANEOUS ONCE
Status: COMPLETED | OUTPATIENT
Start: 2022-05-08 | End: 2022-05-08

## 2022-05-08 RX ORDER — MAGNESIUM SULFATE 100 %
4 CRYSTALS MISCELLANEOUS AS NEEDED
Status: DISCONTINUED | OUTPATIENT
Start: 2022-05-08 | End: 2022-05-08 | Stop reason: SDUPTHER

## 2022-05-08 RX ADMIN — BARICITINIB 2 MG: 2 TABLET, FILM COATED ORAL at 09:54

## 2022-05-08 RX ADMIN — Medication 2 UNITS: at 09:46

## 2022-05-08 RX ADMIN — GABAPENTIN 300 MG: 600 TABLET, FILM COATED ORAL at 09:47

## 2022-05-08 RX ADMIN — ASPIRIN 81 MG 81 MG: 81 TABLET ORAL at 09:47

## 2022-05-08 RX ADMIN — CLOPIDOGREL BISULFATE 75 MG: 75 TABLET ORAL at 09:47

## 2022-05-08 RX ADMIN — Medication 1 SPRAY: at 01:00

## 2022-05-08 RX ADMIN — FINASTERIDE 5 MG: 5 TABLET, FILM COATED ORAL at 09:47

## 2022-05-08 RX ADMIN — Medication 5 UNITS: at 12:47

## 2022-05-08 RX ADMIN — LISINOPRIL 10 MG: 10 TABLET ORAL at 09:47

## 2022-05-08 RX ADMIN — DEXAMETHASONE 6 MG: 1 TABLET ORAL at 09:47

## 2022-05-08 RX ADMIN — Medication 1 SPRAY: at 09:49

## 2022-05-08 RX ADMIN — Medication 4 UNITS: at 04:25

## 2022-05-08 RX ADMIN — ENOXAPARIN SODIUM 30 MG: 100 INJECTION SUBCUTANEOUS at 09:46

## 2022-05-08 NOTE — DISCHARGE SUMMARY
Discharge Summary     Patient:  Evonne Hernández Sr. MRN: [id number]       YOB: 1940       Age: 80 y.o. Date of admission:  5/6/2022    Date of discharge:  5/8/2022    Primary care provider: Dr. Sherin Leiva MD    Admitting provider:  Elizabeth Puentes MD    Discharging provider:  Maritza Gay [address] [phone]. If unavailable, call [phone] and ask the  to page the triage hospitalist.    Consultations  · IP CONSULT TO NEUROLOGY    Procedures  · * No surgery found *    Discharge destination: home. The patient is stable for discharge. Admission diagnosis  · Weakness [R53.1]  · Slurred speech [R47.81]  · COVID-19 [U07.1]    Current Discharge Medication List      START taking these medications    Details   benzonatate (TESSALON) 100 mg capsule Take 1 Capsule by mouth three (3) times daily as needed for Cough for up to 10 days. Qty: 30 Capsule, Refills: 0  Start date: 5/8/2022, End date: 5/18/2022      dexAMETHasone (DECADRON) 6 mg tablet Take 1 Tablet by mouth Daily (before breakfast) for 8 days. Qty: 8 Tablet, Refills: 0  Start date: 5/8/2022, End date: 5/16/2022         CONTINUE these medications which have CHANGED    Details   lisinopriL (PRINIVIL, ZESTRIL) 10 mg tablet Take 1 Tablet by mouth daily. Qty: 30 Tablet, Refills: 0  Start date: 5/8/2022         CONTINUE these medications which have NOT CHANGED    Details   acarbose (PRECOSE) 25 mg tablet 1 tab(s)      glipiZIDE SR (GLUCOTROL XL) 10 mg CR tablet       metFORMIN ER (GLUCOPHAGE XR) 500 mg tablet 1 tab(s)      rosuvastatin (CRESTOR) 20 mg tablet Take 20 mg by mouth nightly. empagliflozin (Jardiance) 10 mg tablet Take  by mouth daily. clopidogreL (Plavix) 75 mg tab Take  by mouth.      gabapentin (NEURONTIN) 600 mg tablet Take 600 mg by mouth two (2) times a day.       finasteride (PROSCAR) 5 mg tablet Take 5 mg by mouth daily. tamsulosin (FLOMAX) 0.4 mg capsule Take 0.4 mg by mouth nightly. cholecalciferol (Vitamin D3) 25 mcg (1,000 unit) cap Take 1,000 Units by mouth daily. aspirin delayed-release 81 mg tablet Take 81 mg by mouth daily. multivitamin (ONE A DAY) tablet Take 1 Tab by mouth daily. STOP taking these medications       amLODIPine (NORVASC) 5 mg tablet Comments:   Reason for Stopping:         atenoloL (TENORMIN) 100 mg tablet Comments:   Reason for Stopping:         hydroCHLOROthiazide (HYDRODIURIL) 25 mg tablet Comments:   Reason for Stopping:         insulin glargine (Lantus Solostar U-100 Insulin) 100 unit/mL (3 mL) inpn Comments:   Reason for Stopping:         methocarbamoL (Robaxin-750) 750 mg tablet Comments:   Reason for Stopping:         lovastatin (MEVACOR) 10 mg tablet Comments:   Reason for Stopping: Follow-up Information     Follow up With Specialties Details Why Contact Info    Dheeraj Will MD Family Medicine In 1 week  [address] [place] [address]  [phone]            Final discharge diagnoses and brief hospital course   Denise Garcia Sr. is a 80 y. o. male who presents with weakness and slurred speech earlier this am- now resolved. He has a phx of arthritis, CAD with stent, DM, HTN, liver disease who p/w code stroke level 2 activation in the field. Pt reports he had a cough and cold that started a few days ago - he woke up at 3 AM and had defecated on himself. His 'body just gave out' and he was not able to move and lay in bed for two hours.  He could also hear himself speak and it sounded slurred.  Patient describes his weakness to be mostly bilateral affecting the arms and legs -but reports increased left leg pain and weakness that he attributes to vascular disease .  He is due to see his vascular surgeon Dr. Robles Simpson did try to stand up this morning and was unable to stand and tells me that he hit the floor but did not hit his head .  After few hours at approximately  5 AM he was able to call his friend who then called EMS. Pt is currently asymptomatic.  His neurological exam is unremarkable -family at the bedside reports no speech issues and no changes in his normal mental status -I tried to review his home medication list-patient was able to confirm most of his home medications but no longer takes insulin-he does continue to take aspirin and Plavix his last doses were yesterday morning-he does report one episode of loose stool-no difficulty swallowing no chest pain or shortness of breath-does have a mild dry cough on exam.    Weakness with slurred speech- now resolved  Likely due to metabolic event - hypoglycemia   - low suspicion for TIA and seizure. - CT head: No acute intracranial hemorrhage, mass or infarct. Stable pattern of atrophy and chronic white matter change most compatible with small vessel  ischemic and/or senescent change. Intracranial atherosclerosis. - CTA head: No evidence for acute large vessel arterial occlusion  - appreciate neurology input  - EEG: normal awake and drowsy routine vEEG  - c/w aspirin and statin      COVID infection  Mild hypoxia - resolved   - elevated CRP from 1.8 to 9.46  - CXR: no acute process  - started on po decadron and Barcitinib on 5/7  - saturating on RA     Peripheral vascular disease -continue aspirin Plavix  Chronic kidney disease stage III - cr  at baseline  Arthritis with peripheral neuropathy- c/w gabapentin  BPH- c/w Proscar and Flomax  Hyperlipidemia-c/w home statin     Diabetes- Mild hypoglycemia on admission-dextrose given.    - Pt has been taking double the doses of Glipizide  - Long discussion - med rec done- he takes metformin, glipizide, acarbose and Jardiance   - follows with endocrinology     Discharge recommendations:  PCP f/u in 1-2 weeks  Endocrinology f/u in 1-2 weeks  Monitor BG ( expected to be elevated on steroids)  Monitor O2 sats ( return to hospital <90%)    Discharge plan discussed in detail with patient and her daughter on phone. They understood and agreed        Physical examination at discharge  Visit Vitals  /63 (BP 1 Location: Left upper arm, BP Patient Position: At rest;Lying)   Pulse 94   Temp 97.6 °F (36.4 °C)   Resp 18   Ht 5' 8\" (1.727 m)   Wt 79.8 kg (176 lb)   SpO2 92%   BMI 26.76 kg/m²     Gen: NAD  HEENT: anicteric sclerae, normal conjunctiva, oropharynx clear, MM moist  Neck: supple, trachea midline, no adenopathy  Heart: RRR, no MRG, no JVD, no peripheral edema  Lungs: CTA b/l, non-labored respirations  Abd: soft, NT, ND, BS+, no organomegaly  Extr: warm  Skin: dry, no rash  Neuro: CN II-XII grossly intact, normal speech, moves all extremities  Psych: normal mood, appropriate affect    Pertinent imaging studies:    Per EMR     Recent Labs     05/08/22 0054 05/07/22 0623 05/06/22 0635   WBC 4.9 11.4* 8.8   HGB 12.3 14.8 13.3   HCT 36.7 45.4 39.2   * 118* 106*     Recent Labs     05/08/22 0054 05/07/22  0949 05/07/22 0623 05/06/22 0635 05/06/22 0635   *  --  134*  --  137   K 5.1 5.0 5.4*   < > 4.8     --  106  --  108   CO2 17*  --  21  --  23   BUN 54*  --  41*  --  41*   CREA 1.85*  --  1.93*  --  1.87*   *  --  127*  --  61*   CA 8.3*  --  9.0  --  8.9    < > = values in this interval not displayed. Recent Labs     05/06/22 0635   AP 56   TP 7.8   ALB 3.6   GLOB 4.2*     Recent Labs     05/06/22 0635   INR 1.1   PTP 11.1      No results for input(s): FE, TIBC, PSAT, FERR in the last 72 hours. No results for input(s): PH, PCO2, PO2 in the last 72 hours. No results for input(s): CPK, CKMB in the last 72 hours.     No lab exists for component: TROPONINI  No components found for: [place]    Chronic Diagnoses:    Problem List as of 5/8/2022 Date Reviewed: 12/30/2021          Codes Class Noted - Resolved    [id number] ICD-10-CM: U07.1  ICD-9-CM: 079.89  5/7/2022 - Present        Slurred speech ICD-10-CM: R47.81  ICD-9-CM: 784.59  5/6/2022 - Present        Weakness ICD-10-CM: R53.1  ICD-9-CM: 780.79  5/6/2022 - Present        Osteomyelitis of right foot ([facility] [address] ICD-10-CM: M86.9  ICD-9-CM: 730.27  12/28/2021 - Present        Hypertension ICD-10-CM: I10  ICD-9-CM: 401.9  Unknown - Present        Diabetes ([facility] [address] ICD-10-CM: E11.9  ICD-9-CM: 250.00  Unknown - Present    Overview Signed 12/28/2021 12:31 AM by ALONSO Ohara     iddm newly on insulin 3/2012             Liver disease ICD-10-CM: K76.9  ICD-9-CM: 573.9  Unknown - Present        CAD (coronary artery disease) ICD-10-CM: I25.10  ICD-9-CM: 414.00  Unknown - Present    Overview Signed 12/28/2021 12:31 AM by ALONSO Ohara     coronary stent             Arthritis ICD-10-CM: M19.90  ICD-9-CM: 716.90  Unknown - Present        Carotid artery stenosis, symptomatic ICD-10-CM: I65.29  ICD-9-CM: 433.10  3/22/2012 - Present              Time spent on discharge related activities today greater than 30 minutes.       Signed:  Mikie Suarez MD                 Hospitalist, Internal Medicine      Cc: Lou Lujan MD

## 2022-05-08 NOTE — DISCHARGE INSTRUCTIONS
Please bring this form with you to show your primary care provider at your follow-up appointment. Primary care provider:  Dr. Rossana Paz MD    Discharging provider:  Gill Henriquez MD    You have been admitted to the hospital with the following diagnoses:  · Weakness [R53.1]  · Slurred speech [R47.81]  · COVID-19 [U07.1]    FOLLOW-UP CARE RECOMMENDATIONS:    Monitor your blood glucose daily. Expected to be high while taking steroids   Please take Glipizide ER 10 mg= 2 tabs daily in am,   ACARBOSE 25MG PO TID  METFORMIN 500MG PO BID  JARDIANCE 10MG = 1/2 TAB(5MG) PO every day    Quarantine for 7 days or until asymptomatic       APPOINTMENTS:  · Follow-up with primary care provider, Dr. Rossana Paz MD  -  Please call [phone] shortly after discharge to set up an appointment to be seen in  1 week      FOLLOW-UP TESTS recommended: none     PENDING TEST RESULTS:  At the time of your discharge the following test results are still pending: none   Please make sure you review these results with your outpatient follow-up provider(s). SYMPTOMS to watch for: chest pain, shortness of breath, fever, chills, nausea, vomiting, diarrhea, change in mentation, falling, weakness, bleeding. DIET/what to eat:  Cardiac Diet and Diabetic Diet    ACTIVITY:  Activity as tolerated      What to do if new or unexpected symptoms occur? If you experience any of the above symptoms (or should other concerns or questions arise after discharge) please call your primary care physician. Return to the emergency room if you cannot get hold of your doctor. · It is very important that you keep your follow-up appointment(s). · Please bring discharge papers, medication list (and/or medication bottles) to your follow-up appointments for review by your outpatient provider(s). · Please check the list of medications and be sure it includes every medication (even non-prescription medications) that your provider wants you to take. · It is important that you take the medication exactly as they are prescribed. · Keep your medication in the bottles provided by the pharmacist and keep a list of the medication names, dosages, and times to be taken in your wallet. · Do not take other medications without consulting your doctor. · If you have any questions about your medications or other instructions, please talk to your nurse or care provider before you leave the hospital.    I understand that if any problems occur once I am at home I am to contact my physician. These instructions were explained to me and I had the opportunity to ask questions.         Leaving the Hospital After Treatment for COVID-19: Care Instructions  Overview     You are being sent home from the hospital after being treated for COVID-19. Being in the hospital can be hard, especially if you've been in the intensive care unit (ICU). Even though you're going home, you probably don't feel well yet. Healing from COVID-19 takes time. You may feel very tired for weeks or months afterward, especially if you were on a ventilator. It will take time to get back to your old level of activity. Some people may have long-lasting health problems. But most people can look forward to feeling a little better every day. If you were on a ventilator, your throat may be sore and your voice hoarse or raspy for a while. After leaving the hospital, some people have feelings of anxiety and depression. They may have nightmares. Or in their mind they may relive events that happened in the hospital (flashbacks). Reach out to your doctor if you're having trouble with these symptoms. Your doctor will tell you if you need to isolate yourself at home, and when you can end isolation. How can you self-isolate when you have COVID-19? If you have COVID-19, there are things you can do to help avoid spreading the virus to others. · Limit contact with people in your home.  If possible, stay in a separate bedroom and use a separate bathroom. · Wear a mask when you are around other people. · If you have to leave home, avoid crowds and try to stay at least 6 feet away from other people. · Avoid contact with pets and other animals. · Cover your mouth and nose with a tissue when you cough or sneeze. Then throw it in the trash right away. · Wash your hands often, especially after you cough or sneeze. Use soap and water, and scrub for at least 20 seconds. If soap and water aren't available, use an alcohol-based hand . · Don't share personal household items. These include bedding, towels, cups and glasses, and eating utensils. · [address] in the warmest water allowed for the fabric type, and dry it completely. It's okay to wash other people's laundry with yours. · Clean and disinfect your home. Use household  and disinfectant wipes or sprays. Follow-up care is a key part of your treatment and safety. Be sure to make and go to all appointments, and call your doctor if you are having problems. It's also a good idea to know your test results and keep a list of the medicines you take. How can you care for yourself at home? · Get plenty of rest. It can help you feel better. · Be kind to yourself if it's taking longer than you expected to feel better. You've been through a stressful time. · Get up and walk around every hour or two while you're resting. Slowly increase your activity as you start to feel better. · Eat healthy foods. · Drink plenty of fluids. If you have kidney, heart, or liver disease and have to limit fluids, talk with your doctor before you increase the amount of fluids you drink. · If needed, take acetaminophen (Tylenol) or ibuprofen (Advil, Motrin) to reduce a fever. It may also help with muscle aches. Read and follow all instructions on the label. When should you call for help? Call 911 anytime you think you may need emergency care.  For example, call if you have life-threatening symptoms, such as:    · You have severe trouble breathing. (You can't talk at all.)     · You have constant chest pain or pressure.     · You are severely dizzy or lightheaded.     · You are confused or can't think clearly.     · You have pale, gray, or blue-colored skin or lips.     · You pass out (lose consciousness) or are very hard to wake up.     · You have loss of balance or trouble walking.     · You have trouble seeing out of one or both eyes.     · You have weakness or drooping on one side of the face.     · You have weakness or numbness in an arm or a leg.     · You have trouble speaking.     · You have a severe headache.     · You have a seizure. Call your doctor now or seek immediate medical care if:    · You have moderate trouble breathing. (You can't speak a full sentence.)     · You are coughing up blood.     · You have signs of low blood pressure. These include feeling lightheaded; being too weak to stand; and having cold, pale, clammy skin. Watch closely for changes in your health, and be sure to contact your doctor if:    · Your symptoms get worse.     · You are not getting better as expected.     · You have new or worse symptoms of anxiety, depression, nightmares, or flashbacks. Call before you go to the doctor's office. Follow their instructions. And wear a mask. Current as of: July 1, 2021               Content Version: 13.2  © 2006-2022 [employer]. Care instructions adapted under license by [employer] (which disclaims liability or warranty for this information).  If you have questions about a medical condition or this instruction, always ask your healthcare professional. [place] [address] any warranty or liability for your use of this information.

## 2022-05-08 NOTE — PROGRESS NOTES
Verbal shift change report given to Rebecca Torres (oncoming nurse) by Paulina Cleaning RN (offgoing nurse). Report included the following information SBAR, Kardex, Intake/Output, MAR, Recent Results and Cardiac Rhythm SR.     3:35PM- Final assessment performed, patient stable without any complaints. V/S checked and recorded. Discharge instruction provided and explained to patient, all questions answered. Copy signed and attached to file. PIV line removed, tolerated well. Discharged patient with his belongings via wheelchair and patient left the hospital accompanied by his son-in-law. Care plan resolved.

## 2022-05-09 ENCOUNTER — PATIENT OUTREACH (OUTPATIENT)
Dept: CASE MANAGEMENT | Age: 82
End: 2022-05-09

## 2022-05-09 LAB
FLUID CULTURE, SPNG2: NORMAL
L PNEUMO1 AG UR QL IA: NEGATIVE
ORGANISM ID, SPNG3: NORMAL
PLEASE NOTE, SPNG4: NORMAL
S PNEUM AG SPEC QL LA: NEGATIVE
SPECIMEN SOURCE: NORMAL
SPECIMEN SOURCE: NORMAL
SPECIMEN, SPNG1: NORMAL

## 2022-05-09 NOTE — PROGRESS NOTES
Care Transitions Initial Call    Call within 2 business days of discharge: Yes     Patient: Janelle Ortega. Patient : 1940 MRN: [id number]    [facility]       Complaint Diagnosis Description Type Department Provider    22 Extremity Weakness Stroke-like symptoms ED to Hosp-Admission (Discharged) (ADMIT) Maribel Quiñones MD; Kenn Moran. .. Was this an external facility discharge? No Discharge Facility: [facility]    Challenges to be reviewed by the provider   Additional needs identified to be addressed with provider: no         Method of communication with provider : none    Discussed COVID-19 related testing which was available at this time. Test results were positive. Patient informed of results, if available? yes     Advance Care Planning:   Does patient have an Advance Directive: not on file. Inpatient Readmission Risk score: Unplanned Readmit Risk Score: 11.6 ( )    Was this a readmission? no   Patient stated reason for the admission: \" covid and just felt so weak\"    Patients top risk factors for readmission: none noted   Interventions to address risk factors: Obtained and reviewed discharge summary and/or continuity of care documents    Care Transition Nurse (CTN) contacted the patient by telephone to perform post hospital discharge assessment. Verified name and  with patient as identifiers. Provided introduction to self, and explanation of the CTN role. CTN reviewed discharge instructions, medical action plan and red flags with patient who verbalized understanding. Were discharge instructions available to patient? yes. Reviewed appropriate site of care based on symptoms and resources available to patient including: PCP, Specialist and When to call 911. Patient given an opportunity to ask questions and does not have any further questions or concerns at this time. The patient agrees to contact the PCP office for questions related to their healthcare. Medication reconciliation was performed with patient, who verbalizes understanding of administration of home medications. Advised obtaining a 90-day supply of all daily and as-needed medications. Referral to Pharm D needed: no     Home Health/Outpatient orders at discharge: none    Durable Medical Equipment ordered at discharge: None    Was patient discharged with a pulse oximeter? no    Discussed follow-up appointments. If no appointment was previously scheduled, appointment scheduling offered: yes. Is follow up appointment scheduled within 7 days of discharge? yes. [facility] follow up appointment(s): No future appointments. Non-[facility] follow up appointment(s): PCP Dr. Bebo Crews on 5/12/2022    Plan for follow-up call in 5-7 days based on severity of symptoms and risk factors. Plan for next call: follow up, symptoms, life alert  CTN provided contact information for future needs. Goals Addressed                 This Visit's Progress     Attends follow-up appointments as directed. 5/9/2022  -Will attend PCP follow up on 5/12  -Has call out to endocrinology for follow up appointment  -patient provides own transportation  -CTN to follow up in 1 week  SP       Prevent complications post hospitalization. 5/9/2022  -patient purchased pulse oximeter. Will report oxygen saturations <90%. Patient reports he is 97-99% on room air.  -Son to look into Life Alert costs.   -CTN to follow up in 1 week  SP

## 2022-05-12 LAB
BACTERIA SPEC CULT: ABNORMAL
GRAM STN SPEC: ABNORMAL
SERVICE CMNT-IMP: ABNORMAL

## 2022-05-14 LAB
OTHER ANTIBIOTIC SUSC ISLT: ABNORMAL
SPECIMEN SOURCE: ABNORMAL

## 2022-05-16 LAB
BACTERIA ISLT: ABNORMAL
OTHER ANTIBIOTIC SUSC ISLT: ABNORMAL
SOURCE, RSRC70: ABNORMAL

## 2022-05-17 ENCOUNTER — PATIENT OUTREACH (OUTPATIENT)
Dept: CASE MANAGEMENT | Age: 82
End: 2022-05-17

## 2022-05-17 NOTE — PROGRESS NOTES
Care Transitions Follow Up Call    Challenges to be reviewed by the provider   Additional needs identified to be addressed with provider: no  none           Method of communication with provider : none    Care Transition Nurse (CTN) contacted the patient by telephone to follow up after admission on 2022. Verified name and  with patient as identifiers. Addressed changes since last contact: none  Follow up appointment completed? yes. Was follow up appointment scheduled within 7 days of discharge? yes. Advance Care Planning:   Does patient have an Advance Directive:  currently not on file; patient declined education    CTN reviewed discharge instructions, medical action plan and red flags with patient and discussed any barriers to care and/or understanding of plan of care after discharge. Discussed appropriate site of care based on symptoms and resources available to patient including: PCP and Specialist. The patient agrees to contact the PCP office for questions related to their healthcare. Patients top risk factors for readmission: none noted   Interventions to address risk factors: Obtained and reviewed discharge summary and/or continuity of care documents    [facility] follow [facility] appointment(s): No future appointments. [facility] follow up appointment(s): none    CTN provided contact information for future needs. Plan for follow-up call in 7-10 days based on severity of symptoms and risk factors. Plan for next call: follow up      Goals Addressed                 This Visit's Progress     Attends follow-up appointments as directed. On track     2022  -Attended PCP follow up on . No appointments scheduled at this time. No medication changes made at follow up. -CTN educated patient on [facility] and provided contact information to keep on hand as a resource.   -CTN to follow up in 1 week  SP  2022  -Will attend PCP follow up on   -Has call out to endocrinology for follow up appointment  -patient provides own transportation  -CTN to follow up in 1 week  SP       Prevent complications post hospitalization. On track     5/17/2022  -reports Oxygen Sat of 99% on room air. Lingering cough and fatigue. Will increase fluid intake. Will monitor for signs of worsening infection. -CTN to follow up in 1 week  SP     5/9/2022  -patient purchased pulse oximeter. Will report oxygen saturations <90%. Patient reports he is 97-99% on room air.  -Son to look into Life Alert costs.   -CTN to follow up in 1 week  SP

## 2022-05-26 ENCOUNTER — PATIENT OUTREACH (OUTPATIENT)
Dept: CASE MANAGEMENT | Age: 82
End: 2022-05-26

## 2023-04-29 RX ORDER — METFORMIN HYDROCHLORIDE 500 MG/1
TABLET, EXTENDED RELEASE ORAL
COMMUNITY

## 2023-04-29 RX ORDER — ROSUVASTATIN CALCIUM 20 MG/1
20 TABLET, COATED ORAL
COMMUNITY

## 2023-04-29 RX ORDER — ASPIRIN 81 MG/1
81 TABLET ORAL DAILY
COMMUNITY

## 2023-04-29 RX ORDER — LISINOPRIL 10 MG/1
10 TABLET ORAL DAILY
COMMUNITY
Start: 2022-05-08

## 2023-04-29 RX ORDER — GABAPENTIN 600 MG/1
600 TABLET ORAL 2 TIMES DAILY
COMMUNITY

## 2023-04-29 RX ORDER — FINASTERIDE 5 MG/1
5 TABLET, FILM COATED ORAL DAILY
COMMUNITY

## 2023-04-29 RX ORDER — ACARBOSE 25 MG/1
TABLET ORAL
COMMUNITY

## 2023-04-29 RX ORDER — CLOPIDOGREL BISULFATE 75 MG/1
TABLET ORAL
COMMUNITY

## 2023-04-29 RX ORDER — TAMSULOSIN HYDROCHLORIDE 0.4 MG/1
0.4 CAPSULE ORAL
COMMUNITY

## 2023-04-29 RX ORDER — GLIPIZIDE 10 MG/1
TABLET, FILM COATED, EXTENDED RELEASE ORAL
COMMUNITY
Start: 2022-03-19

## 2023-10-02 ENCOUNTER — APPOINTMENT (OUTPATIENT)
Facility: HOSPITAL | Age: 83
DRG: 322 | End: 2023-10-02
Payer: MEDICARE

## 2023-10-02 ENCOUNTER — HOSPITAL ENCOUNTER (INPATIENT)
Facility: HOSPITAL | Age: 83
LOS: 3 days | Discharge: HOME | DRG: 322 | End: 2023-10-05
Attending: STUDENT IN AN ORGANIZED HEALTH CARE EDUCATION/TRAINING PROGRAM | Admitting: INTERNAL MEDICINE
Payer: MEDICARE

## 2023-10-02 DIAGNOSIS — R07.9 CHEST PAIN: ICD-10-CM

## 2023-10-02 DIAGNOSIS — I20.0 UNSTABLE ANGINA (HCC): Primary | ICD-10-CM

## 2023-10-02 LAB
ALBUMIN SERPL-MCNC: 3.7 G/DL (ref 3.5–5)
ALBUMIN/GLOB SERPL: 0.9 (ref 1.1–2.2)
ALP SERPL-CCNC: 62 U/L (ref 45–117)
ALT SERPL-CCNC: 23 U/L (ref 12–78)
ANION GAP BLD CALC-SCNC: 7 (ref 10–20)
ANION GAP SERPL CALC-SCNC: 5 MMOL/L (ref 5–15)
AST SERPL-CCNC: 15 U/L (ref 15–37)
BASE DEFICIT BLD-SCNC: 2.5 MMOL/L
BASOPHILS # BLD: 0 K/UL (ref 0–0.1)
BASOPHILS NFR BLD: 0 % (ref 0–1)
BILIRUB SERPL-MCNC: 1.5 MG/DL (ref 0.2–1)
BUN SERPL-MCNC: 28 MG/DL (ref 6–20)
BUN/CREAT SERPL: 16 (ref 12–20)
CA-I BLD-MCNC: 1.25 MMOL/L (ref 1.12–1.32)
CALCIUM SERPL-MCNC: 9.2 MG/DL (ref 8.5–10.1)
CHLORIDE BLD-SCNC: 107 MMOL/L (ref 100–108)
CHLORIDE SERPL-SCNC: 107 MMOL/L (ref 97–108)
CO2 BLD-SCNC: 25 MMOL/L (ref 19–24)
CO2 SERPL-SCNC: 23 MMOL/L (ref 21–32)
CREAT SERPL-MCNC: 1.74 MG/DL (ref 0.7–1.3)
CREAT UR-MCNC: 1.4 MG/DL (ref 0.6–1.3)
DIFFERENTIAL METHOD BLD: ABNORMAL
EOSINOPHIL # BLD: 0.2 K/UL (ref 0–0.4)
EOSINOPHIL NFR BLD: 3 % (ref 0–7)
ERYTHROCYTE [DISTWIDTH] IN BLOOD BY AUTOMATED COUNT: 12.4 % (ref 11.5–14.5)
GLOBULIN SER CALC-MCNC: 4.3 G/DL (ref 2–4)
GLUCOSE BLD STRIP.AUTO-MCNC: 110 MG/DL (ref 74–106)
GLUCOSE BLD STRIP.AUTO-MCNC: 135 MG/DL (ref 65–117)
GLUCOSE BLD STRIP.AUTO-MCNC: 187 MG/DL (ref 65–117)
GLUCOSE SERPL-MCNC: 149 MG/DL (ref 65–100)
HCO3 BLDA-SCNC: 24 MMOL/L
HCT VFR BLD AUTO: 38.2 % (ref 36.6–50.3)
HGB BLD-MCNC: 13.1 G/DL (ref 12.1–17)
IMM GRANULOCYTES # BLD AUTO: 0 K/UL (ref 0–0.04)
IMM GRANULOCYTES NFR BLD AUTO: 0 % (ref 0–0.5)
LACTATE BLD-SCNC: 1.04 MMOL/L (ref 0.4–2)
LYMPHOCYTES # BLD: 1.4 K/UL (ref 0.8–3.5)
LYMPHOCYTES NFR BLD: 18 % (ref 12–49)
MCH RBC QN AUTO: 31 PG (ref 26–34)
MCHC RBC AUTO-ENTMCNC: 34.3 G/DL (ref 30–36.5)
MCV RBC AUTO: 90.5 FL (ref 80–99)
MONOCYTES # BLD: 0.6 K/UL (ref 0–1)
MONOCYTES NFR BLD: 8 % (ref 5–13)
NEUTS SEG # BLD: 5.4 K/UL (ref 1.8–8)
NEUTS SEG NFR BLD: 71 % (ref 32–75)
NRBC # BLD: 0 K/UL (ref 0–0.01)
NRBC BLD-RTO: 0 PER 100 WBC
PCO2 BLDV: 48.5 MMHG (ref 41–51)
PH BLDV: 7.31 (ref 7.32–7.42)
PLATELET # BLD AUTO: 138 K/UL (ref 150–400)
PMV BLD AUTO: 10.3 FL (ref 8.9–12.9)
PO2 BLDV: <27 MMHG (ref 25–40)
POTASSIUM BLD-SCNC: 4.9 MMOL/L (ref 3.5–5.5)
POTASSIUM SERPL-SCNC: 4.5 MMOL/L (ref 3.5–5.1)
PROT SERPL-MCNC: 8 G/DL (ref 6.4–8.2)
RBC # BLD AUTO: 4.22 M/UL (ref 4.1–5.7)
SERVICE CMNT-IMP: ABNORMAL
SERVICE CMNT-IMP: ABNORMAL
SODIUM BLD-SCNC: 139 MMOL/L (ref 136–145)
SODIUM SERPL-SCNC: 135 MMOL/L (ref 136–145)
SPECIMEN SITE: ABNORMAL
TROPONIN I SERPL HS-MCNC: 11 NG/L (ref 0–76)
TROPONIN I SERPL HS-MCNC: 11 NG/L (ref 0–76)
WBC # BLD AUTO: 7.6 K/UL (ref 4.1–11.1)

## 2023-10-02 PROCEDURE — 84484 ASSAY OF TROPONIN QUANT: CPT

## 2023-10-02 PROCEDURE — 80053 COMPREHEN METABOLIC PANEL: CPT

## 2023-10-02 PROCEDURE — 94761 N-INVAS EAR/PLS OXIMETRY MLT: CPT

## 2023-10-02 PROCEDURE — 84132 ASSAY OF SERUM POTASSIUM: CPT

## 2023-10-02 PROCEDURE — 2580000003 HC RX 258: Performed by: INTERNAL MEDICINE

## 2023-10-02 PROCEDURE — 71045 X-RAY EXAM CHEST 1 VIEW: CPT

## 2023-10-02 PROCEDURE — 71275 CT ANGIOGRAPHY CHEST: CPT

## 2023-10-02 PROCEDURE — 87040 BLOOD CULTURE FOR BACTERIA: CPT

## 2023-10-02 PROCEDURE — 82947 ASSAY GLUCOSE BLOOD QUANT: CPT

## 2023-10-02 PROCEDURE — 1100000003 HC PRIVATE W/ TELEMETRY

## 2023-10-02 PROCEDURE — 93005 ELECTROCARDIOGRAM TRACING: CPT | Performed by: STUDENT IN AN ORGANIZED HEALTH CARE EDUCATION/TRAINING PROGRAM

## 2023-10-02 PROCEDURE — 96374 THER/PROPH/DIAG INJ IV PUSH: CPT

## 2023-10-02 PROCEDURE — 6360000004 HC RX CONTRAST MEDICATION: Performed by: STUDENT IN AN ORGANIZED HEALTH CARE EDUCATION/TRAINING PROGRAM

## 2023-10-02 PROCEDURE — 6370000000 HC RX 637 (ALT 250 FOR IP): Performed by: INTERNAL MEDICINE

## 2023-10-02 PROCEDURE — 82962 GLUCOSE BLOOD TEST: CPT

## 2023-10-02 PROCEDURE — 82330 ASSAY OF CALCIUM: CPT

## 2023-10-02 PROCEDURE — 82803 BLOOD GASES ANY COMBINATION: CPT

## 2023-10-02 PROCEDURE — 36415 COLL VENOUS BLD VENIPUNCTURE: CPT

## 2023-10-02 PROCEDURE — 99285 EMERGENCY DEPT VISIT HI MDM: CPT

## 2023-10-02 PROCEDURE — 6360000002 HC RX W HCPCS: Performed by: INTERNAL MEDICINE

## 2023-10-02 PROCEDURE — 6360000002 HC RX W HCPCS: Performed by: NURSE PRACTITIONER

## 2023-10-02 PROCEDURE — 84295 ASSAY OF SERUM SODIUM: CPT

## 2023-10-02 PROCEDURE — 85025 COMPLETE CBC W/AUTO DIFF WBC: CPT

## 2023-10-02 PROCEDURE — 6360000002 HC RX W HCPCS: Performed by: STUDENT IN AN ORGANIZED HEALTH CARE EDUCATION/TRAINING PROGRAM

## 2023-10-02 RX ORDER — ASPIRIN 325 MG
325 TABLET, DELAYED RELEASE (ENTERIC COATED) ORAL ONCE
Status: COMPLETED | OUTPATIENT
Start: 2023-10-02 | End: 2023-10-02

## 2023-10-02 RX ORDER — ACETAMINOPHEN 650 MG/1
650 SUPPOSITORY RECTAL EVERY 6 HOURS PRN
Status: DISCONTINUED | OUTPATIENT
Start: 2023-10-02 | End: 2023-10-05 | Stop reason: HOSPADM

## 2023-10-02 RX ORDER — ROSUVASTATIN CALCIUM 20 MG/1
20 TABLET, COATED ORAL NIGHTLY
Status: DISCONTINUED | OUTPATIENT
Start: 2023-10-02 | End: 2023-10-05 | Stop reason: HOSPADM

## 2023-10-02 RX ORDER — SODIUM CHLORIDE 9 MG/ML
INJECTION, SOLUTION INTRAVENOUS CONTINUOUS
Status: DISCONTINUED | OUTPATIENT
Start: 2023-10-02 | End: 2023-10-05

## 2023-10-02 RX ORDER — LISINOPRIL 10 MG/1
10 TABLET ORAL DAILY
Status: DISCONTINUED | OUTPATIENT
Start: 2023-10-02 | End: 2023-10-05 | Stop reason: HOSPADM

## 2023-10-02 RX ORDER — SODIUM CHLORIDE 0.9 % (FLUSH) 0.9 %
5-40 SYRINGE (ML) INJECTION PRN
Status: DISCONTINUED | OUTPATIENT
Start: 2023-10-02 | End: 2023-10-05 | Stop reason: HOSPADM

## 2023-10-02 RX ORDER — POLYETHYLENE GLYCOL 3350 17 G/17G
17 POWDER, FOR SOLUTION ORAL DAILY PRN
Status: DISCONTINUED | OUTPATIENT
Start: 2023-10-02 | End: 2023-10-05 | Stop reason: HOSPADM

## 2023-10-02 RX ORDER — ONDANSETRON 2 MG/ML
4 INJECTION INTRAMUSCULAR; INTRAVENOUS EVERY 6 HOURS PRN
Status: DISCONTINUED | OUTPATIENT
Start: 2023-10-02 | End: 2023-10-05 | Stop reason: HOSPADM

## 2023-10-02 RX ORDER — INSULIN LISPRO 100 [IU]/ML
0-8 INJECTION, SOLUTION INTRAVENOUS; SUBCUTANEOUS
Status: DISCONTINUED | OUTPATIENT
Start: 2023-10-02 | End: 2023-10-05 | Stop reason: HOSPADM

## 2023-10-02 RX ORDER — SODIUM CHLORIDE 9 MG/ML
INJECTION, SOLUTION INTRAVENOUS PRN
Status: DISCONTINUED | OUTPATIENT
Start: 2023-10-02 | End: 2023-10-05 | Stop reason: HOSPADM

## 2023-10-02 RX ORDER — MORPHINE SULFATE 2 MG/ML
2 INJECTION, SOLUTION INTRAMUSCULAR; INTRAVENOUS
Status: COMPLETED | OUTPATIENT
Start: 2023-10-02 | End: 2023-10-02

## 2023-10-02 RX ORDER — TAMSULOSIN HYDROCHLORIDE 0.4 MG/1
0.4 CAPSULE ORAL DAILY
Status: DISCONTINUED | OUTPATIENT
Start: 2023-10-03 | End: 2023-10-05 | Stop reason: HOSPADM

## 2023-10-02 RX ORDER — INSULIN LISPRO 100 [IU]/ML
0-4 INJECTION, SOLUTION INTRAVENOUS; SUBCUTANEOUS NIGHTLY
Status: DISCONTINUED | OUTPATIENT
Start: 2023-10-02 | End: 2023-10-05 | Stop reason: HOSPADM

## 2023-10-02 RX ORDER — ONDANSETRON 4 MG/1
4 TABLET, ORALLY DISINTEGRATING ORAL EVERY 8 HOURS PRN
Status: DISCONTINUED | OUTPATIENT
Start: 2023-10-02 | End: 2023-10-05 | Stop reason: HOSPADM

## 2023-10-02 RX ORDER — CLOPIDOGREL BISULFATE 75 MG/1
75 TABLET ORAL DAILY
Status: DISCONTINUED | OUTPATIENT
Start: 2023-10-03 | End: 2023-10-05 | Stop reason: HOSPADM

## 2023-10-02 RX ORDER — ASPIRIN 81 MG/1
81 TABLET ORAL DAILY
Status: DISCONTINUED | OUTPATIENT
Start: 2023-10-02 | End: 2023-10-02

## 2023-10-02 RX ORDER — ACETAMINOPHEN 325 MG/1
650 TABLET ORAL EVERY 6 HOURS PRN
Status: DISCONTINUED | OUTPATIENT
Start: 2023-10-02 | End: 2023-10-05 | Stop reason: HOSPADM

## 2023-10-02 RX ORDER — GABAPENTIN 300 MG/1
600 CAPSULE ORAL 2 TIMES DAILY
Status: DISCONTINUED | OUTPATIENT
Start: 2023-10-02 | End: 2023-10-05 | Stop reason: HOSPADM

## 2023-10-02 RX ORDER — MORPHINE SULFATE 4 MG/ML
4 INJECTION, SOLUTION INTRAMUSCULAR; INTRAVENOUS ONCE
Status: COMPLETED | OUTPATIENT
Start: 2023-10-02 | End: 2023-10-02

## 2023-10-02 RX ORDER — ASPIRIN 81 MG/1
81 TABLET ORAL DAILY
Status: DISCONTINUED | OUTPATIENT
Start: 2023-10-03 | End: 2023-10-05 | Stop reason: HOSPADM

## 2023-10-02 RX ORDER — SODIUM CHLORIDE 0.9 % (FLUSH) 0.9 %
5-40 SYRINGE (ML) INJECTION EVERY 12 HOURS SCHEDULED
Status: DISCONTINUED | OUTPATIENT
Start: 2023-10-02 | End: 2023-10-05 | Stop reason: HOSPADM

## 2023-10-02 RX ORDER — FINASTERIDE 5 MG/1
5 TABLET, FILM COATED ORAL DAILY
Status: DISCONTINUED | OUTPATIENT
Start: 2023-10-03 | End: 2023-10-05 | Stop reason: HOSPADM

## 2023-10-02 RX ORDER — ENOXAPARIN SODIUM 100 MG/ML
40 INJECTION SUBCUTANEOUS DAILY
Status: DISCONTINUED | OUTPATIENT
Start: 2023-10-02 | End: 2023-10-04

## 2023-10-02 RX ADMIN — MORPHINE SULFATE 2 MG: 2 INJECTION, SOLUTION INTRAMUSCULAR; INTRAVENOUS at 22:31

## 2023-10-02 RX ADMIN — ENOXAPARIN SODIUM 40 MG: 100 INJECTION SUBCUTANEOUS at 17:33

## 2023-10-02 RX ADMIN — SODIUM CHLORIDE, PRESERVATIVE FREE 10 ML: 5 INJECTION INTRAVENOUS at 21:00

## 2023-10-02 RX ADMIN — IOPAMIDOL 100 ML: 755 INJECTION, SOLUTION INTRAVENOUS at 15:59

## 2023-10-02 RX ADMIN — SODIUM CHLORIDE 1000 MG: 900 INJECTION INTRAVENOUS at 17:31

## 2023-10-02 RX ADMIN — ASPIRIN 325 MG: 325 TABLET, COATED ORAL at 17:33

## 2023-10-02 RX ADMIN — MORPHINE SULFATE 4 MG: 4 INJECTION, SOLUTION INTRAMUSCULAR; INTRAVENOUS at 15:43

## 2023-10-02 RX ADMIN — ROSUVASTATIN 20 MG: 20 TABLET, FILM COATED ORAL at 22:31

## 2023-10-02 RX ADMIN — SODIUM CHLORIDE: 9 INJECTION, SOLUTION INTRAVENOUS at 18:36

## 2023-10-02 RX ADMIN — AZITHROMYCIN MONOHYDRATE 500 MG: 500 INJECTION, POWDER, LYOPHILIZED, FOR SOLUTION INTRAVENOUS at 20:55

## 2023-10-02 ASSESSMENT — PAIN - FUNCTIONAL ASSESSMENT
PAIN_FUNCTIONAL_ASSESSMENT: ACTIVITIES ARE NOT PREVENTED
PAIN_FUNCTIONAL_ASSESSMENT: ACTIVITIES ARE NOT PREVENTED

## 2023-10-02 ASSESSMENT — PAIN DESCRIPTION - ONSET: ONSET: GRADUAL

## 2023-10-02 ASSESSMENT — PAIN SCALES - GENERAL
PAINLEVEL_OUTOF10: 5
PAINLEVEL_OUTOF10: 8
PAINLEVEL_OUTOF10: 0
PAINLEVEL_OUTOF10: 5
PAINLEVEL_OUTOF10: 0
PAINLEVEL_OUTOF10: 5

## 2023-10-02 ASSESSMENT — PAIN DESCRIPTION - FREQUENCY: FREQUENCY: OTHER (COMMENT)

## 2023-10-02 ASSESSMENT — PAIN DESCRIPTION - ORIENTATION
ORIENTATION: LEFT

## 2023-10-02 ASSESSMENT — PAIN DESCRIPTION - DESCRIPTORS
DESCRIPTORS: SHARP

## 2023-10-02 ASSESSMENT — PAIN DESCRIPTION - PAIN TYPE: TYPE: OTHER (COMMENT)

## 2023-10-02 ASSESSMENT — PAIN DESCRIPTION - LOCATION
LOCATION: CHEST

## 2023-10-02 ASSESSMENT — PAIN DESCRIPTION - DIRECTION: RADIATING_TOWARDS: LEFT SHOULDER BLADE

## 2023-10-02 NOTE — H&P
Hospitalist Admission Note    NAME:   Elva Rodney Sr.   : 1940   MRN: [id number]     Date/Time: 10/2/2023 5:11 PM    Patient PCP: Sukumar Smiley MD    ______________________________________________________________________  Given the patient's current clinical presentation, I have a high level of concern for decompensation if discharged from the emergency department. Complex decision making was performed, which includes reviewing the patient's available past medical records, laboratory results, and x-ray films. My assessment of this patient's clinical condition and my plan of care is as follows. Assessment / Plan:    Chest pain rule out unstable angina  History of coronary artery disease s/p stenting  -CT angiogram of the chest shows bibasilar atelectasis with pleural effusion which is small  -Troponin x2 is within normal limits  -We will check 1 more troponin in a.m.  -Continue PTA aspirin and Plavix. Check hemoglobin A1c and lipid panel. Consult cardiology. Keep n.p.o. from midnight    Bibasilar atelectasis with small left-sided pleural effusion  -Does not have any fever or leukocytosis to suspect pneumonia  -Check procalcitonin  -He received empiric antibiotics in the emergency department and will hold off on further antibiotics    CKD stage III  -Creatinine is close to baseline of around 1.7. Check CBC in a.m.  -We will hold PTA lisinopril due to CKD and also anticipated angiogram in a.m. and risk of contrast-induced nephropathy  -Resume lisinopril as appropriate    Diabetes mellitus type 2  -Hold home Jardiance, metformin and glipizide. Start insulin sliding scale with blood sugar checks    Hypertension  BPH  Diabetic neuropathy  Dyslipidemia  -Holding home lisinopril due to anticipated angiogram in a.m. and also CKD  -Continue PTA Flomax  -Continue PTA gabapentin  -Continue PTA Crestor    Mild total hyperbilirubinemia  -Total bilirubin is 1.5.   LFTs are normal.  We will repeat CMP

## 2023-10-02 NOTE — ACP (ADVANCE CARE PLANNING)
Advance Care Planning Note      NAME: Vaibhav Hernandez Sr.   :  1940   MRN:  [id number]     Date/Time:  10/2/2023 5:41 PM    Active Diagnoses:    Chest pain  CAD s/p stnets  CKD 3  DM  Hypertension  BPH  Diabetic neuropathy  Dyslipidemia      These active diagnoses are of sufficient risk that focused discussion on advance care planning is indicated in order to allow the patient to thoughtfully consider personal goals of care, and if situations arise that prevent the ability to personally give input, to ensure appropriate representation of their personal desires for different levels and aggressiveness of care. Discussion:   Code status addressed and wants to be a Full Code. Patient wants central line and vasopressors if needed. Patient  would like to assign wife Prema Short  as the surrogate decision maker. Persons present and participating in discussion: Christine Saleem., Errol Arnold MD, Wife Delfino Mcginnis. Time Spent:   Total time spent face-to-face in education and discussion:   16   minutes.          Errol Arnold MD   Hospitalist

## 2023-10-03 ENCOUNTER — APPOINTMENT (OUTPATIENT)
Facility: HOSPITAL | Age: 83
DRG: 322 | End: 2023-10-03
Attending: INTERNAL MEDICINE
Payer: MEDICARE

## 2023-10-03 LAB
ALBUMIN SERPL-MCNC: 3.2 G/DL (ref 3.5–5)
ALBUMIN/GLOB SERPL: 1 (ref 1.1–2.2)
ALP SERPL-CCNC: 58 U/L (ref 45–117)
ALT SERPL-CCNC: 19 U/L (ref 12–78)
ANION GAP SERPL CALC-SCNC: 5 MMOL/L (ref 5–15)
AST SERPL-CCNC: 16 U/L (ref 15–37)
BASOPHILS # BLD: 0 K/UL (ref 0–0.1)
BASOPHILS NFR BLD: 1 % (ref 0–1)
BILIRUB DIRECT SERPL-MCNC: 0.3 MG/DL (ref 0–0.2)
BILIRUB SERPL-MCNC: 1.3 MG/DL (ref 0.2–1)
BUN SERPL-MCNC: 28 MG/DL (ref 6–20)
BUN/CREAT SERPL: 18 (ref 12–20)
CALCIUM SERPL-MCNC: 8.4 MG/DL (ref 8.5–10.1)
CHLORIDE SERPL-SCNC: 111 MMOL/L (ref 97–108)
CHOLEST SERPL-MCNC: 108 MG/DL
CO2 SERPL-SCNC: 23 MMOL/L (ref 21–32)
CREAT SERPL-MCNC: 1.57 MG/DL (ref 0.7–1.3)
DIFFERENTIAL METHOD BLD: ABNORMAL
ECHO AV AREA PEAK VELOCITY: 1.9 CM2
ECHO AV AREA PEAK VELOCITY: 2 CM2
ECHO AV AREA VTI: 2.1 CM2
ECHO AV AREA/BSA VTI: 1.1 CM2/M2
ECHO AV CUSP MM: 2 CM
ECHO AV MEAN GRADIENT: 5 MMHG
ECHO AV MEAN VELOCITY: 1 M/S
ECHO AV PEAK GRADIENT: 11 MMHG
ECHO AV PEAK GRADIENT: 12 MMHG
ECHO AV PEAK VELOCITY: 1.7 M/S
ECHO AV PEAK VELOCITY: 1.7 M/S
ECHO AV VTI: 32 CM
ECHO BSA: 1.95 M2
ECHO LA DIAMETER INDEX: 1.81 CM/M2
ECHO LA DIAMETER: 3.5 CM
ECHO LV E' LATERAL VELOCITY: 7 CM/S
ECHO LV E' SEPTAL VELOCITY: 35 CM/S
ECHO LV FRACTIONAL SHORTENING: 24 % (ref 28–44)
ECHO LV INTERNAL DIMENSION DIASTOLE INDEX: 2.12 CM/M2
ECHO LV INTERNAL DIMENSION DIASTOLIC: 4.1 CM (ref 4.2–5.9)
ECHO LV INTERNAL DIMENSION SYSTOLIC INDEX: 1.61 CM/M2
ECHO LV INTERNAL DIMENSION SYSTOLIC: 3.1 CM
ECHO LV IVSD: 1.6 CM (ref 0.6–1)
ECHO LV MASS 2D: 266.9 G (ref 88–224)
ECHO LV MASS INDEX 2D: 138.3 G/M2 (ref 49–115)
ECHO LV POSTERIOR WALL DIASTOLIC: 1.6 CM (ref 0.6–1)
ECHO LV RELATIVE WALL THICKNESS RATIO: 0.78
ECHO LVOT AREA: 3.5 CM2
ECHO LVOT AV VTI INDEX: 0.63
ECHO LVOT DIAM: 2.1 CM
ECHO LVOT MEAN GRADIENT: 2 MMHG
ECHO LVOT PEAK GRADIENT: 4 MMHG
ECHO LVOT PEAK GRADIENT: 4 MMHG
ECHO LVOT PEAK VELOCITY: 1 M/S
ECHO LVOT PEAK VELOCITY: 1 M/S
ECHO LVOT STROKE VOLUME INDEX: 36.1 ML/M2
ECHO LVOT SV: 69.6 ML
ECHO LVOT VTI: 20.1 CM
ECHO MV A VELOCITY: 1.09 M/S
ECHO MV AREA VTI: 2.8 CM2
ECHO MV E DECELERATION TIME (DT): 247.4 MS
ECHO MV E VELOCITY: 0.63 M/S
ECHO MV E/A RATIO: 0.58
ECHO MV E/E' LATERAL: 9
ECHO MV E/E' RATIO (AVERAGED): 5.4
ECHO MV E/E' SEPTAL: 1.8
ECHO MV LVOT VTI INDEX: 1.22
ECHO MV MAX VELOCITY: 1.2 M/S
ECHO MV MEAN GRADIENT: 3 MMHG
ECHO MV MEAN VELOCITY: 0.8 M/S
ECHO MV PEAK GRADIENT: 6 MMHG
ECHO MV VTI: 24.5 CM
ECHO TV REGURGITANT MAX VELOCITY: 2.72 M/S
ECHO TV REGURGITANT PEAK GRADIENT: 30 MMHG
EKG ATRIAL RATE: 103 BPM
EKG DIAGNOSIS: NORMAL
EKG P AXIS: 23 DEGREES
EKG P-R INTERVAL: 140 MS
EKG Q-T INTERVAL: 338 MS
EKG QRS DURATION: 100 MS
EKG QTC CALCULATION (BAZETT): 442 MS
EKG R AXIS: -40 DEGREES
EKG T AXIS: 52 DEGREES
EKG VENTRICULAR RATE: 103 BPM
EOSINOPHIL # BLD: 0.1 K/UL (ref 0–0.4)
EOSINOPHIL NFR BLD: 2 % (ref 0–7)
ERYTHROCYTE [DISTWIDTH] IN BLOOD BY AUTOMATED COUNT: 12.7 % (ref 11.5–14.5)
EST. AVERAGE GLUCOSE BLD GHB EST-MCNC: 137 MG/DL
GLOBULIN SER CALC-MCNC: 3.3 G/DL (ref 2–4)
GLUCOSE BLD STRIP.AUTO-MCNC: 102 MG/DL (ref 65–117)
GLUCOSE BLD STRIP.AUTO-MCNC: 108 MG/DL (ref 65–117)
GLUCOSE BLD STRIP.AUTO-MCNC: 150 MG/DL (ref 65–117)
GLUCOSE BLD STRIP.AUTO-MCNC: 258 MG/DL (ref 65–117)
GLUCOSE SERPL-MCNC: 99 MG/DL (ref 65–100)
HBA1C MFR BLD: 6.4 % (ref 4–5.6)
HCT VFR BLD AUTO: 34.7 % (ref 36.6–50.3)
HDLC SERPL-MCNC: 42 MG/DL
HDLC SERPL: 2.6 (ref 0–5)
HGB BLD-MCNC: 11.6 G/DL (ref 12.1–17)
IMM GRANULOCYTES # BLD AUTO: 0 K/UL (ref 0–0.04)
IMM GRANULOCYTES NFR BLD AUTO: 0 % (ref 0–0.5)
LDLC SERPL CALC-MCNC: 29.6 MG/DL (ref 0–100)
LYMPHOCYTES # BLD: 1.2 K/UL (ref 0.8–3.5)
LYMPHOCYTES NFR BLD: 20 % (ref 12–49)
MCH RBC QN AUTO: 31 PG (ref 26–34)
MCHC RBC AUTO-ENTMCNC: 33.4 G/DL (ref 30–36.5)
MCV RBC AUTO: 92.8 FL (ref 80–99)
MONOCYTES # BLD: 0.6 K/UL (ref 0–1)
MONOCYTES NFR BLD: 9 % (ref 5–13)
NEUTS SEG # BLD: 4.2 K/UL (ref 1.8–8)
NEUTS SEG NFR BLD: 68 % (ref 32–75)
NRBC # BLD: 0 K/UL (ref 0–0.01)
NRBC BLD-RTO: 0 PER 100 WBC
PLATELET # BLD AUTO: 129 K/UL (ref 150–400)
PMV BLD AUTO: 10 FL (ref 8.9–12.9)
POTASSIUM SERPL-SCNC: 4.7 MMOL/L (ref 3.5–5.1)
PROCALCITONIN SERPL-MCNC: 0.06 NG/ML
PROT SERPL-MCNC: 6.5 G/DL (ref 6.4–8.2)
RBC # BLD AUTO: 3.74 M/UL (ref 4.1–5.7)
SERVICE CMNT-IMP: ABNORMAL
SERVICE CMNT-IMP: ABNORMAL
SERVICE CMNT-IMP: NORMAL
SERVICE CMNT-IMP: NORMAL
SODIUM SERPL-SCNC: 139 MMOL/L (ref 136–145)
TRIGL SERPL-MCNC: 182 MG/DL
TROPONIN I SERPL HS-MCNC: 12 NG/L (ref 0–76)
VLDLC SERPL CALC-MCNC: 36.4 MG/DL
WBC # BLD AUTO: 6.1 K/UL (ref 4.1–11.1)

## 2023-10-03 PROCEDURE — 80048 BASIC METABOLIC PNL TOTAL CA: CPT

## 2023-10-03 PROCEDURE — 6370000000 HC RX 637 (ALT 250 FOR IP): Performed by: INTERNAL MEDICINE

## 2023-10-03 PROCEDURE — 2580000003 HC RX 258: Performed by: INTERNAL MEDICINE

## 2023-10-03 PROCEDURE — 80076 HEPATIC FUNCTION PANEL: CPT

## 2023-10-03 PROCEDURE — 6370000000 HC RX 637 (ALT 250 FOR IP): Performed by: NURSE PRACTITIONER

## 2023-10-03 PROCEDURE — 82962 GLUCOSE BLOOD TEST: CPT

## 2023-10-03 PROCEDURE — 83036 HEMOGLOBIN GLYCOSYLATED A1C: CPT

## 2023-10-03 PROCEDURE — 2709999900 HC NON-CHARGEABLE SUPPLY: Performed by: INTERNAL MEDICINE

## 2023-10-03 PROCEDURE — 2060000000 HC ICU INTERMEDIATE R&B

## 2023-10-03 PROCEDURE — 93458 L HRT ARTERY/VENTRICLE ANGIO: CPT | Performed by: INTERNAL MEDICINE

## 2023-10-03 PROCEDURE — 84145 PROCALCITONIN (PCT): CPT

## 2023-10-03 PROCEDURE — C1894 INTRO/SHEATH, NON-LASER: HCPCS | Performed by: INTERNAL MEDICINE

## 2023-10-03 PROCEDURE — 84484 ASSAY OF TROPONIN QUANT: CPT

## 2023-10-03 PROCEDURE — 85025 COMPLETE CBC W/AUTO DIFF WBC: CPT

## 2023-10-03 PROCEDURE — 6360000002 HC RX W HCPCS: Performed by: INTERNAL MEDICINE

## 2023-10-03 PROCEDURE — 6360000004 HC RX CONTRAST MEDICATION: Performed by: INTERNAL MEDICINE

## 2023-10-03 PROCEDURE — 80061 LIPID PANEL: CPT

## 2023-10-03 PROCEDURE — C8924 2D TTE W OR W/O FOL W/CON,FU: HCPCS

## 2023-10-03 PROCEDURE — 36415 COLL VENOUS BLD VENIPUNCTURE: CPT

## 2023-10-03 PROCEDURE — C1769 GUIDE WIRE: HCPCS | Performed by: INTERNAL MEDICINE

## 2023-10-03 RX ORDER — PANTOPRAZOLE SODIUM 40 MG/1
40 TABLET, DELAYED RELEASE ORAL
Status: DISCONTINUED | OUTPATIENT
Start: 2023-10-03 | End: 2023-10-05 | Stop reason: HOSPADM

## 2023-10-03 RX ORDER — DEXTRAN 70, GLYCERIN, HYPROMELLOSE 1; 2; 3 MG/ML; MG/ML; MG/ML
1 SOLUTION/ DROPS OPHTHALMIC PRN
Status: DISCONTINUED | OUTPATIENT
Start: 2023-10-03 | End: 2023-10-05 | Stop reason: HOSPADM

## 2023-10-03 RX ADMIN — ROSUVASTATIN 20 MG: 20 TABLET, FILM COATED ORAL at 21:51

## 2023-10-03 RX ADMIN — METOPROLOL TARTRATE 12.5 MG: 25 TABLET, FILM COATED ORAL at 11:29

## 2023-10-03 RX ADMIN — NITROGLYCERIN 0.5 INCH: 20 OINTMENT TOPICAL at 21:52

## 2023-10-03 RX ADMIN — ASPIRIN 81 MG: 81 TABLET, COATED ORAL at 08:55

## 2023-10-03 RX ADMIN — NITROGLYCERIN 0.5 INCH: 20 OINTMENT TOPICAL at 16:56

## 2023-10-03 RX ADMIN — CLOPIDOGREL BISULFATE 75 MG: 75 TABLET ORAL at 08:54

## 2023-10-03 RX ADMIN — SODIUM CHLORIDE, PRESERVATIVE FREE 10 ML: 5 INJECTION INTRAVENOUS at 08:57

## 2023-10-03 RX ADMIN — GABAPENTIN 600 MG: 300 CAPSULE ORAL at 21:51

## 2023-10-03 RX ADMIN — DEXTRAN 70, GLYCERIN, HYPROMELLOSE 1 DROP: 1; 2; 3 SOLUTION/ DROPS OPHTHALMIC at 16:57

## 2023-10-03 RX ADMIN — TAMSULOSIN HYDROCHLORIDE 0.4 MG: 0.4 CAPSULE ORAL at 08:56

## 2023-10-03 RX ADMIN — GABAPENTIN 600 MG: 300 CAPSULE ORAL at 08:54

## 2023-10-03 RX ADMIN — PERFLUTREN 1.5 ML: 6.52 INJECTION, SUSPENSION INTRAVENOUS at 16:41

## 2023-10-03 RX ADMIN — SODIUM CHLORIDE: 9 INJECTION, SOLUTION INTRAVENOUS at 06:15

## 2023-10-03 RX ADMIN — METOPROLOL TARTRATE 12.5 MG: 25 TABLET, FILM COATED ORAL at 21:51

## 2023-10-03 RX ADMIN — ENOXAPARIN SODIUM 40 MG: 100 INJECTION SUBCUTANEOUS at 08:54

## 2023-10-03 RX ADMIN — SODIUM CHLORIDE, PRESERVATIVE FREE 10 ML: 5 INJECTION INTRAVENOUS at 22:04

## 2023-10-03 RX ADMIN — FINASTERIDE 5 MG: 5 TABLET, FILM COATED ORAL at 08:55

## 2023-10-03 RX ADMIN — SODIUM CHLORIDE: 9 INJECTION, SOLUTION INTRAVENOUS at 22:19

## 2023-10-03 ASSESSMENT — PAIN DESCRIPTION - ORIENTATION: ORIENTATION: LEFT

## 2023-10-03 ASSESSMENT — PAIN SCALES - GENERAL
PAINLEVEL_OUTOF10: 4
PAINLEVEL_OUTOF10: 0

## 2023-10-03 ASSESSMENT — PAIN DESCRIPTION - LOCATION
LOCATION: CHEST
LOCATION: CHEST

## 2023-10-03 NOTE — PROGRESS NOTES
Hospitalist Progress Note    NAME:   Delmer Dorado Sr.   : 1940   MRN: [id number]     Date/Time: 10/3/2023 1:24 PM  Patient PCP: Sushma Gonzalez MD    Estimated discharge date:  Barriers:       Assessment / Plan:        Chest pain POA  Suspect unstable angina/ACS POA  History of coronary artery disease s/p stenting POA  -CT angiogram of the chest shows bibasilar atelectasis with pleural effusion which is small  -Troponin x 3 is within normal limits  LDL= 29  A1c pending    Continue PTA aspirin and Plavix. IP cardiology consulted-patient is n.p.o[facility] from [facility] [place] [address] for cardiac cath today with Dr. Ce Abdul noted this afternoon  2D echo= pending  Continue home  statin  Holding Lisinopril for Cr elevated     Bibasilar atelectasis with small left-sided pleural effusion  -Does not have any fever or leukocytosis to suspect pneumonia  procalcitonin 0.06  -He received empiric antibiotics in the emergency department and will hold off on further antibiotics     CKD stage III  -Creatinine is close to baseline of around 1.7->1.5 today  -We will hold PTA lisinopril due to CKD and also anticipated angiogram in a.m. and risk of contrast-induced nephropathy  -Resume lisinopril as appropriate     Diabetes mellitus type 2  -Hold home Jardiance, metformin and glipizide. Start insulin sliding scale with blood sugar checks     Hypertension  BPH  Diabetic neuropathy  Dyslipidemia  -Holding home lisinopril due to anticipated cath  -Continue PTA Flomax  -Continue PTA gabapentin  -Continue PTA Crestor     Mild total hyperbilirubinemia   Likely due to alcoholic liver cirrhosis history  -Total bilirubin is 1.5.   LFTs are normal.    Follows up at [facility]:   I personally reviewed labs: CBC, BMP, LFTs, troponins, procalcitonin  I personally reviewed imaging: CT angiogram of the chest, chest x-ray  I personally reviewed EKG: Sinus tachycardia 103 bpm with questionable new RBBB on my

## 2023-10-03 NOTE — CONSULTS
[facility]        Consult    NAME: Rafael Nickerson Sr.   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 EKG with L Axis and incomplete RBBB. Troponin 11Last cath  with Myriam,  Start low dose BB, Add Ntg 0.5 inch. Still having Chest pain. 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 L Pleural effusion     [x]       High complexity decision making was performed in this patient at high risk for decompensation with multiple organ involvement. Subjective:   CHIEF COMPLAINT: Chest Pain    HISTORY OF PRESENT ILLNESS:     Yordan Irvin is a 80 y.o.  male who presented after three to four days of chest pain and pressure with every inspiration. He has noted fatigue and shortness of breath COLLINS for awhile now. HE still works in [address] but is mostly sedentary. He does have known CAD, HTN, HLD, Type 2 DM, Bilateral Carotid Disease, PAD, previous tobacco, ETOH abuse history, known liver dysfunction and thrombocytopenia. He describes the chest pain as pressure and worse with inspiration that radiates into his back and right arm.  He has some associated shortness of breath

## 2023-10-03 NOTE — ED PROVIDER NOTES
(40 mg SubCUTAneous Given 10/2/23 1733)   ondansetron (ZOFRAN-ODT) disintegrating tablet 4 mg (has no administration in time range)     Or   ondansetron (ZOFRAN) injection 4 mg (has no administration in time range)   polyethylene glycol (GLYCOLAX) packet 17 g (has no administration in time range)   acetaminophen (TYLENOL) tablet 650 mg (has no administration in time range)     Or   acetaminophen (TYLENOL) suppository 650 mg (has no administration in time range)   aspirin EC tablet 81 mg (has no administration in time range)   insulin lispro (HUMALOG) injection vial 0-8 Units ( SubCUTAneous Not Given 10/2/23 1837)   insulin lispro (HUMALOG) injection vial 0-4 Units (has no administration in time range)   0.9 % sodium chloride infusion ( IntraVENous New Bag 10/2/23 1836)   morphine sulfate (PF) injection 4 mg (4 mg IntraVENous Given 10/2/23 1543)   iopamidol (ISOVUE-370) 76 % injection 100 mL (100 mLs IntraVENous Given 10/2/23 1559)   aspirin EC tablet 325 mg (325 mg Oral Given 10/2/23 1733)   cefTRIAXone (ROCEPHIN) 1,000 mg in sodium chloride 0.9 % 50 mL IVPB (mini-bag) (1,000 mg IntraVENous New Bag 10/2/23 1731)   azithromycin (ZITHROMAX) 500 mg in sodium chloride 0.9 % 250 mL IVPB ([id number]) (0 mg IntraVENous Stopped 10/2/23 2057)   morphine (PF) injection 2 mg (2 mg IntraVENous Given 10/2/23 2231)       CONSULTS: (Who and What was discussed)  IP CONSULT TO CARDIOLOGY      Chronic Conditions: cad, htn, dm    Social Determinants affecting Dx or Tx: None      Records Reviewed (source and summary of external notes): Nursing Notes, Previous Radiology Studies, and Previous Laboratory Studies    CC/HPI Summary, DDx, ED Course, and Reassessment: Patient presents with CP. DDx:  ACS, Aortic dissection, PNA, PE, PTX, pericarditis, myocarditis, GERD, costochondritis, anxiety. Heart score: 6. History somewhat atypical with pleuritic chest pain/radiating to back, will obtain CTA to r/o PE and dissection. Initial trop is 11.

## 2023-10-03 NOTE — PROGRESS NOTES
Per [facility] nurse with supervisor approval, they will be accepting this patient transfer on night shift

## 2023-10-03 NOTE — PROGRESS NOTES
[facility]     Progress Note      10/3/2023 3:38 PM  NAME: Tilmon Buerger. MRN:  [id number]   Admit Diagnosis: Unstable angina ([address] [facility]) [I20.0]     Problem List:     - Chest pain unclear    - CAD with hx of PCI of Lcx in 2001, cath in 2009 negative, stress 2021 no ischemia  - Normal EF  - Incomplete LBBB  - HTN  - DM with neuropathy  - Dyslipidemia with LDL of 29  - PVD with right CEA, right toe amputation Dr Hillary Aguilera  - CKD with Cr of 1.6  - BPH  - Hx of Etoh cirrhosis with plt of 129  - Quit tobacco in 2005, Quit Etoh  , currently engaged, works in  [address], ADLs occ uses a cane    Prior cardiologist: Myriam          Assessment/Plan:     - Chest pain, negative hs troponin x 3  - Euvolemic  - Sinus  - CTA negative for PE    - Cath Wed at 7:30 all r/b/a reviewed  - Echo    - Cont asa, plavix  - Cont added metoprolol  - Cont added nitropaste  - Holding lisinopril  - Hydration  - Cont jardiance  - Cont rosuvastatin        [x]       High complexity decision making was performed in this patient at high risk for decompensation with multiple organ involvement. We discussed the expected course, resolution and complications of the diagnoses in detail. Medication risk, benefits, costs, interactions, and alternatives were discussed as indicated. I advised him to contact the office if his condition worsens, changes or fails to improve as anticipated. Patient expressed understanding with the diagnoses  and plan. Subjective:     Tilmon Buerger denies chest pain, dyspnea. Discussed with RN events overnight.      Review of Systems:    Symptom Y/N Comments  Symptom Y/N Comments   Fever/Chills N   Chest Pain N    Poor Appetite N   Edema N    Cough N   Abdominal Pain N    Sputum N   Joint Pain N    SOB/COLLINS N   Pruritis/Rash N    Nausea/vomit N   Tolerating PT/OT Y    Diarrhea N   Tolerating Diet Y    Constipation N   Other       Could NOT obtain due to:      Objective:      Physical

## 2023-10-04 LAB
ACT BLD: 0 SECS (ref 79–138)
ACT BLD: 305 SECS (ref 79–138)
ACT BLD: 342 SECS (ref 79–138)
ACT BLD: 528 SECS (ref 79–138)
ANION GAP SERPL CALC-SCNC: 5 MMOL/L (ref 5–15)
BUN SERPL-MCNC: 27 MG/DL (ref 6–20)
BUN/CREAT SERPL: 18 (ref 12–20)
CALCIUM SERPL-MCNC: 8.4 MG/DL (ref 8.5–10.1)
CHLORIDE SERPL-SCNC: 112 MMOL/L (ref 97–108)
CO2 SERPL-SCNC: 22 MMOL/L (ref 21–32)
CREAT SERPL-MCNC: 1.53 MG/DL (ref 0.7–1.3)
ECHO BSA: 1.95 M2
EKG ATRIAL RATE: 72 BPM
EKG DIAGNOSIS: NORMAL
EKG P AXIS: 41 DEGREES
EKG P-R INTERVAL: 160 MS
EKG Q-T INTERVAL: 384 MS
EKG QRS DURATION: 116 MS
EKG QTC CALCULATION (BAZETT): 420 MS
EKG R AXIS: -47 DEGREES
EKG T AXIS: 51 DEGREES
EKG VENTRICULAR RATE: 72 BPM
GLUCOSE BLD STRIP.AUTO-MCNC: 159 MG/DL (ref 65–117)
GLUCOSE BLD STRIP.AUTO-MCNC: 175 MG/DL (ref 65–117)
GLUCOSE BLD STRIP.AUTO-MCNC: 186 MG/DL (ref 65–117)
GLUCOSE BLD STRIP.AUTO-MCNC: 252 MG/DL (ref 65–117)
GLUCOSE SERPL-MCNC: 176 MG/DL (ref 65–100)
POTASSIUM SERPL-SCNC: 4.4 MMOL/L (ref 3.5–5.1)
SERVICE CMNT-IMP: ABNORMAL
SODIUM SERPL-SCNC: 139 MMOL/L (ref 136–145)

## 2023-10-04 PROCEDURE — C9602 PERC D-E COR STENT ATHER S: HCPCS | Performed by: INTERNAL MEDICINE

## 2023-10-04 PROCEDURE — 36415 COLL VENOUS BLD VENIPUNCTURE: CPT

## 2023-10-04 PROCEDURE — B2111ZZ FLUOROSCOPY OF MULTIPLE CORONARY ARTERIES USING LOW OSMOLAR CONTRAST: ICD-10-PCS | Performed by: INTERNAL MEDICINE

## 2023-10-04 PROCEDURE — 4A023N7 MEASUREMENT OF CARDIAC SAMPLING AND PRESSURE, LEFT HEART, PERCUTANEOUS APPROACH: ICD-10-PCS | Performed by: INTERNAL MEDICINE

## 2023-10-04 PROCEDURE — C1874 STENT, COATED/COV W/DEL SYS: HCPCS | Performed by: INTERNAL MEDICINE

## 2023-10-04 PROCEDURE — C1894 INTRO/SHEATH, NON-LASER: HCPCS | Performed by: INTERNAL MEDICINE

## 2023-10-04 PROCEDURE — 2500000003 HC RX 250 WO HCPCS: Performed by: INTERNAL MEDICINE

## 2023-10-04 PROCEDURE — C9600 PERC DRUG-EL COR STENT SING: HCPCS | Performed by: INTERNAL MEDICINE

## 2023-10-04 PROCEDURE — 6370000000 HC RX 637 (ALT 250 FOR IP): Performed by: INTERNAL MEDICINE

## 2023-10-04 PROCEDURE — 82962 GLUCOSE BLOOD TEST: CPT

## 2023-10-04 PROCEDURE — 2709999900 HC NON-CHARGEABLE SUPPLY: Performed by: INTERNAL MEDICINE

## 2023-10-04 PROCEDURE — C1724 CATH, TRANS ATHEREC,ROTATION: HCPCS | Performed by: INTERNAL MEDICINE

## 2023-10-04 PROCEDURE — 2580000003 HC RX 258: Performed by: INTERNAL MEDICINE

## 2023-10-04 PROCEDURE — 6370000000 HC RX 637 (ALT 250 FOR IP): Performed by: NURSE PRACTITIONER

## 2023-10-04 PROCEDURE — 6360000002 HC RX W HCPCS: Performed by: INTERNAL MEDICINE

## 2023-10-04 PROCEDURE — C1713 ANCHOR/SCREW BN/BN,TIS/BN: HCPCS | Performed by: INTERNAL MEDICINE

## 2023-10-04 PROCEDURE — C1887 CATHETER, GUIDING: HCPCS | Performed by: INTERNAL MEDICINE

## 2023-10-04 PROCEDURE — 6360000004 HC RX CONTRAST MEDICATION: Performed by: INTERNAL MEDICINE

## 2023-10-04 PROCEDURE — 80048 BASIC METABOLIC PNL TOTAL CA: CPT

## 2023-10-04 PROCEDURE — C1725 CATH, TRANSLUMIN NON-LASER: HCPCS | Performed by: INTERNAL MEDICINE

## 2023-10-04 PROCEDURE — 93005 ELECTROCARDIOGRAM TRACING: CPT | Performed by: INTERNAL MEDICINE

## 2023-10-04 PROCEDURE — 027135Z DILATION OF CORONARY ARTERY, TWO ARTERIES WITH TWO DRUG-ELUTING INTRALUMINAL DEVICES, PERCUTANEOUS APPROACH: ICD-10-PCS | Performed by: INTERNAL MEDICINE

## 2023-10-04 PROCEDURE — 76937 US GUIDE VASCULAR ACCESS: CPT | Performed by: INTERNAL MEDICINE

## 2023-10-04 PROCEDURE — 99153 MOD SED SAME PHYS/QHP EA: CPT | Performed by: INTERNAL MEDICINE

## 2023-10-04 PROCEDURE — 2060000000 HC ICU INTERMEDIATE R&B

## 2023-10-04 PROCEDURE — 93458 L HRT ARTERY/VENTRICLE ANGIO: CPT | Performed by: INTERNAL MEDICINE

## 2023-10-04 PROCEDURE — C1769 GUIDE WIRE: HCPCS | Performed by: INTERNAL MEDICINE

## 2023-10-04 PROCEDURE — 85347 COAGULATION TIME ACTIVATED: CPT

## 2023-10-04 PROCEDURE — 99152 MOD SED SAME PHYS/QHP 5/>YRS: CPT | Performed by: INTERNAL MEDICINE

## 2023-10-04 DEVICE — STENT ONYXNG25034UX ONYX 2.50X34RX
Type: IMPLANTABLE DEVICE | Status: FUNCTIONAL
Brand: ONYX FRONTIER™

## 2023-10-04 DEVICE — STENT ONYXNG22534UX ONYX 2.25X34RX
Type: IMPLANTABLE DEVICE | Status: FUNCTIONAL
Brand: ONYX FRONTIER™

## 2023-10-04 RX ORDER — CLOPIDOGREL 300 MG/1
TABLET, FILM COATED ORAL PRN
Status: DISCONTINUED | OUTPATIENT
Start: 2023-10-04 | End: 2023-10-04 | Stop reason: HOSPADM

## 2023-10-04 RX ORDER — METOPROLOL SUCCINATE 25 MG/1
25 TABLET, EXTENDED RELEASE ORAL DAILY
Status: DISCONTINUED | OUTPATIENT
Start: 2023-10-04 | End: 2023-10-05 | Stop reason: HOSPADM

## 2023-10-04 RX ORDER — VERAPAMIL HYDROCHLORIDE 2.5 MG/ML
INJECTION, SOLUTION INTRAVENOUS PRN
Status: DISCONTINUED | OUTPATIENT
Start: 2023-10-04 | End: 2023-10-04 | Stop reason: HOSPADM

## 2023-10-04 RX ORDER — 0.9 % SODIUM CHLORIDE 0.9 %
INTRAVENOUS SOLUTION INTRAVENOUS CONTINUOUS PRN
Status: COMPLETED | OUTPATIENT
Start: 2023-10-04 | End: 2023-10-04

## 2023-10-04 RX ORDER — ACETAMINOPHEN 325 MG/1
650 TABLET ORAL EVERY 4 HOURS PRN
Status: DISCONTINUED | OUTPATIENT
Start: 2023-10-04 | End: 2023-10-05 | Stop reason: HOSPADM

## 2023-10-04 RX ORDER — HEPARIN SODIUM 10000 [USP'U]/ML
INJECTION, SOLUTION INTRAVENOUS; SUBCUTANEOUS PRN
Status: DISCONTINUED | OUTPATIENT
Start: 2023-10-04 | End: 2023-10-04 | Stop reason: HOSPADM

## 2023-10-04 RX ORDER — FENTANYL CITRATE 50 UG/ML
INJECTION, SOLUTION INTRAMUSCULAR; INTRAVENOUS PRN
Status: DISCONTINUED | OUTPATIENT
Start: 2023-10-04 | End: 2023-10-04 | Stop reason: HOSPADM

## 2023-10-04 RX ORDER — SODIUM CHLORIDE 0.9 % (FLUSH) 0.9 %
5-40 SYRINGE (ML) INJECTION EVERY 12 HOURS SCHEDULED
Status: DISCONTINUED | OUTPATIENT
Start: 2023-10-04 | End: 2023-10-05 | Stop reason: HOSPADM

## 2023-10-04 RX ORDER — HEPARIN SODIUM 1000 [USP'U]/ML
INJECTION, SOLUTION INTRAVENOUS; SUBCUTANEOUS PRN
Status: DISCONTINUED | OUTPATIENT
Start: 2023-10-04 | End: 2023-10-04 | Stop reason: HOSPADM

## 2023-10-04 RX ORDER — IODIXANOL 320 MG/ML
INJECTION, SOLUTION INTRAVASCULAR PRN
Status: DISCONTINUED | OUTPATIENT
Start: 2023-10-04 | End: 2023-10-04 | Stop reason: HOSPADM

## 2023-10-04 RX ORDER — ONDANSETRON 2 MG/ML
4 INJECTION INTRAMUSCULAR; INTRAVENOUS EVERY 6 HOURS PRN
Status: DISCONTINUED | OUTPATIENT
Start: 2023-10-04 | End: 2023-10-05 | Stop reason: HOSPADM

## 2023-10-04 RX ORDER — LIDOCAINE HYDROCHLORIDE 10 MG/ML
INJECTION, SOLUTION INFILTRATION; PERINEURAL PRN
Status: DISCONTINUED | OUTPATIENT
Start: 2023-10-04 | End: 2023-10-04 | Stop reason: HOSPADM

## 2023-10-04 RX ADMIN — GABAPENTIN 600 MG: 300 CAPSULE ORAL at 20:54

## 2023-10-04 RX ADMIN — ROSUVASTATIN 20 MG: 20 TABLET, FILM COATED ORAL at 20:54

## 2023-10-04 RX ADMIN — CLOPIDOGREL BISULFATE 75 MG: 75 TABLET ORAL at 07:47

## 2023-10-04 RX ADMIN — ASPIRIN 81 MG: 81 TABLET, COATED ORAL at 07:46

## 2023-10-04 RX ADMIN — INSULIN LISPRO 4 UNITS: 100 INJECTION, SOLUTION INTRAVENOUS; SUBCUTANEOUS at 17:53

## 2023-10-04 RX ADMIN — NITROGLYCERIN 0.5 INCH: 20 OINTMENT TOPICAL at 23:38

## 2023-10-04 RX ADMIN — SODIUM CHLORIDE, PRESERVATIVE FREE 10 ML: 5 INJECTION INTRAVENOUS at 20:54

## 2023-10-04 RX ADMIN — NITROGLYCERIN 0.5 INCH: 20 OINTMENT TOPICAL at 16:34

## 2023-10-04 RX ADMIN — SODIUM CHLORIDE, PRESERVATIVE FREE 10 ML: 5 INJECTION INTRAVENOUS at 12:04

## 2023-10-04 RX ADMIN — METOPROLOL SUCCINATE 25 MG: 25 TABLET, FILM COATED, EXTENDED RELEASE ORAL at 11:56

## 2023-10-04 RX ADMIN — PANTOPRAZOLE SODIUM 40 MG: 40 TABLET, DELAYED RELEASE ORAL at 06:19

## 2023-10-04 RX ADMIN — NITROGLYCERIN 0.5 INCH: 20 OINTMENT TOPICAL at 06:19

## 2023-10-04 RX ADMIN — TAMSULOSIN HYDROCHLORIDE 0.4 MG: 0.4 CAPSULE ORAL at 11:56

## 2023-10-04 RX ADMIN — FINASTERIDE 5 MG: 5 TABLET, FILM COATED ORAL at 11:55

## 2023-10-04 RX ADMIN — SODIUM CHLORIDE, PRESERVATIVE FREE 10 ML: 5 INJECTION INTRAVENOUS at 20:53

## 2023-10-04 RX ADMIN — GABAPENTIN 600 MG: 300 CAPSULE ORAL at 11:56

## 2023-10-04 ASSESSMENT — PAIN SCALES - GENERAL
PAINLEVEL_OUTOF10: 5
PAINLEVEL_OUTOF10: 0
PAINLEVEL_OUTOF10: 0
PAINLEVEL_OUTOF10: 1
PAINLEVEL_OUTOF10: 0

## 2023-10-04 ASSESSMENT — PAIN DESCRIPTION - LOCATION: LOCATION: CHEST

## 2023-10-04 ASSESSMENT — PAIN DESCRIPTION - DESCRIPTORS: DESCRIPTORS: SHARP

## 2023-10-04 NOTE — PROGRESS NOTES
Hospitalist Progress Note    NAME:   José Luis Najera Sr.   : 1940   MRN: [id number]     Date/Time: 10/4/2023 3:55 PM  Patient PCP: Zeke Hannon MD    Estimated discharge date: 10/5  Barriers: Cardiac clearance      Assessment / Plan:        Chest pain POA  Suspect unstable angina/ACS POA  History of coronary artery disease s/p stenting POA  -CT angiogram of the chest shows bibasilar atelectasis with pleural effusion which is small  -Troponin x 3 is within normal limits  LDL= 29  A1c 6.4    Continue PTA aspirin and Plavix. n.p.o. from midnight-s/p cardiac cath with stents  2D echo= EF 55-60% with no WMA noted  Continue home statin  Holding Lisinopril for Cr elevated  Metoprolol added  Imdur on discharge     Bibasilar atelectasis with small left-sided pleural effusion  -Does not have any fever or leukocytosis to suspect pneumonia  procalcitonin 0.06  -He received empiric antibiotics in the emergency department and will hold off on further antibiotics     CKD stage III  -Creatinine is close to baseline of around 1.7->1.5 ->1.5 today  -We will hold PTA lisinopril due to CKD and also anticipated angiogram in a.m. and risk of contrast-induced nephropathy  -Resume lisinopril as appropriate     Diabetes mellitus type 2  -Hold home Jardiance, metformin and glipizide. Start insulin sliding scale with blood sugar checks     Hypertension  BPH  Diabetic neuropathy  Dyslipidemia  -Holding home lisinopril due to anticipated cath  -Continue PTA Flomax  -Continue PTA gabapentin  -Continue PTA Crestor     Mild total hyperbilirubinemia   Likely due to alcoholic liver cirrhosis history  -Total bilirubin is 1.5.   LFTs are normal.    Follows up at [facility]:   I personally reviewed labs: CBC, BMP A1c  I personally reviewed imaging: Echo results  I personally reviewed EKG: Sinus tachycardia 103 bpm with questionable new RBBB on my interpretation  Toxic drug monitoring:

## 2023-10-04 NOTE — CARE COORDINATION
Care Management Initial Assessment       RUR: 13% \"low risk\"  Readmission? No  1st IM letter given? Yes, given by Patient Access Team on 10/3/23  1st  letter given: N/A    Initial note - 6530 PM: Chart reviewed. CM met with pt and pt spouse at the bedside to introduce self and role. Verified contact information and demographics. Pt resides with his significant other, Ginger Blanca, in a one level home with 2 ORION. Pt PCP is Dr. Mau Contreras with last visit being about three weeks ago. Preferred pharmacy is [facility]. Pt has a hx of [address] services but was unable to recall the name of the agency. Pt has no hx of a SNF stay. Pt has no DME needs as he has a cane, walker, and wheelchair at home. Pt is independent with ADL's and is an active . Pt has no ACP on file. Pt has an ACP at home and will work on getting one of his family members to bring it to the hospital. Pt significant other, Ginger Blanca ([phone]) able to transport pt home upon d/c.  Full assessment below:     10/04/23 1607   Service Assessment   Patient Orientation Alert and Oriented;Person;Place;Situation;Self   Cognition Alert   History Provided By Patient   Primary Caregiver Self   Support Systems Spouse/Significant Other;Children   Patient's Healthcare Decision Maker is:   Sury Simpson)   PCP Verified by CM Yes  (Dr. Mau Contreras)   Last Visit to PCP   (3 weeks ago)   Prior Functional Level Independent in ADLs/IADLs   Current Functional Level Independent in ADLs/IADLs   Can patient return to prior living arrangement Yes   Ability to make needs known: Good   Family able to assist with home care needs: Yes   Financial Resources Medicare  (Medicare Part A and B, [place] Medicare)   Community Resources None   Social/Functional History   Lives With Significant other  Jim Owen)   Type of [address] [place] [address]  One level   Home Access Stairs to enter with rails   Entrance Stairs - Number of Steps 2 [phone];Walker,

## 2023-10-04 NOTE — PROGRESS NOTES
2130 TRANSFER - IN REPORT:    Verbal report received from [facility] RN on [place].  being received from [facility] for ordered procedure      Report consisted of patient's Situation, Background, Assessment and   Recommendations(SBAR). Information from the following report(s) Nurse Handoff Report and Cardiac Rhythm    was reviewed with the receiving nurse. Opportunity for questions and clarification was provided. Assessment completed upon patient's arrival to unit and care assumed.

## 2023-10-04 NOTE — PROGRESS NOTES
[facility]     Progress Note      10/4/2023 9:51 AM  NAME: Laura Bower Sr. MRN:  [id number]   Admit Diagnosis: Unstable angina ([address] [facility]) [I20.0]     Problem List:     - Chest pain unclear    - CAD with hx of PCI of Lcx in 2001, cath in 2009 negative, Cath 10/2023 with PCI of LAD with Roto then KARLENE, PCI of Lcx with KARLENE, diseased rPL x 2 will be treated medically initially due to small size and tortous RCA. - Echo 10/2023  EF 50%  - Incomplete LBBB  - HTN  - DM with neuropathy  - Dyslipidemia with LDL of 29  - PVD with right CEA, right toe amputation Dr Antoinette Tijerina  - CKD with Cr of 1.6  - BPH  - Hx of Etoh cirrhosis with plt of 129  - Quit tobacco in 2005, Quit Etoh  , currently engaged, works in  [address] [employer], ADLs occ uses a cane    Prior cardiologist: Leydi Ponce          Assessment/Plan:     - Chest pain, negative hs troponin x 3  - Euvolemic  - Sinus  - CTA negative for PE    - Cath with PCI of LAD and LCx    - Cont asa, plavix  - Cont added metoprolol  - Cont added nitropaste, change to imdur  - Holding lisinopril  - Hydration  - Cont jardiance  - Cont rosuvastatin    Goal home 10/5          [x]       High complexity decision making was performed in this patient at high risk for decompensation with multiple organ involvement. We discussed the expected course, resolution and complications of the diagnoses in detail. Medication risk, benefits, costs, interactions, and alternatives were discussed as indicated. I advised him to contact the office if his condition worsens, changes or fails to improve as anticipated. Patient expressed understanding with the diagnoses  and plan. Subjective:     Anjel Donaldson denies chest pain, dyspnea. Discussed with RN events overnight.      Review of Systems:    Symptom Y/N Comments  Symptom Y/N Comments   Fever/Chills N   Chest Pain N    Poor Appetite N   Edema N    Cough N   Abdominal Pain N    Sputum N   Joint Pain N    SOB/COLLINS N   Pruritis/Rash

## 2023-10-04 NOTE — PLAN OF CARE
Problem: Pain  Goal: Verbalizes/displays adequate comfort level or baseline comfort level  Outcome: Progressing     Problem: ABCDS Injury Assessment  Goal: Absence of physical injury  Outcome: Progressing     Problem: Safety - Adult  Goal: Free from fall injury  Outcome: Progressing     Problem: Chronic Conditions and Co-morbidities  Goal: Patient's chronic conditions and co-morbidity symptoms are monitored and maintained or improved  Outcome: Progressing

## 2023-10-05 VITALS
HEIGHT: 68 IN | WEIGHT: 175.04 LBS | RESPIRATION RATE: 17 BRPM | OXYGEN SATURATION: 94 % | TEMPERATURE: 97.9 F | HEART RATE: 80 BPM | DIASTOLIC BLOOD PRESSURE: 88 MMHG | BODY MASS INDEX: 26.53 KG/M2 | SYSTOLIC BLOOD PRESSURE: 151 MMHG

## 2023-10-05 DIAGNOSIS — Z95.5 STENTED CORONARY ARTERY: Primary | ICD-10-CM

## 2023-10-05 LAB
ANION GAP SERPL CALC-SCNC: 6 MMOL/L (ref 5–15)
BUN SERPL-MCNC: 21 MG/DL (ref 6–20)
BUN/CREAT SERPL: 16 (ref 12–20)
CALCIUM SERPL-MCNC: 8.1 MG/DL (ref 8.5–10.1)
CHLORIDE SERPL-SCNC: 113 MMOL/L (ref 97–108)
CO2 SERPL-SCNC: 20 MMOL/L (ref 21–32)
CREAT SERPL-MCNC: 1.28 MG/DL (ref 0.7–1.3)
GLUCOSE BLD STRIP.AUTO-MCNC: 169 MG/DL (ref 65–117)
GLUCOSE SERPL-MCNC: 165 MG/DL (ref 65–100)
POTASSIUM SERPL-SCNC: 4 MMOL/L (ref 3.5–5.1)
SERVICE CMNT-IMP: ABNORMAL
SODIUM SERPL-SCNC: 139 MMOL/L (ref 136–145)

## 2023-10-05 PROCEDURE — 2580000003 HC RX 258: Performed by: INTERNAL MEDICINE

## 2023-10-05 PROCEDURE — 82962 GLUCOSE BLOOD TEST: CPT

## 2023-10-05 PROCEDURE — 36415 COLL VENOUS BLD VENIPUNCTURE: CPT

## 2023-10-05 PROCEDURE — 80048 BASIC METABOLIC PNL TOTAL CA: CPT

## 2023-10-05 PROCEDURE — 6370000000 HC RX 637 (ALT 250 FOR IP): Performed by: INTERNAL MEDICINE

## 2023-10-05 RX ORDER — CLOPIDOGREL BISULFATE 75 MG/1
75 TABLET ORAL DAILY
Qty: 30 TABLET | Refills: 11 | Status: SHIPPED | OUTPATIENT
Start: 2023-10-05 | End: 2023-10-05 | Stop reason: SDUPTHER

## 2023-10-05 RX ORDER — ISOSORBIDE MONONITRATE 30 MG/1
30 TABLET, EXTENDED RELEASE ORAL DAILY
Qty: 30 TABLET | Refills: 11 | Status: SHIPPED | OUTPATIENT
Start: 2023-10-05

## 2023-10-05 RX ORDER — ISOSORBIDE MONONITRATE 30 MG/1
30 TABLET, EXTENDED RELEASE ORAL DAILY
Qty: 30 TABLET | Refills: 11 | Status: SHIPPED | OUTPATIENT
Start: 2023-10-05 | End: 2023-10-05 | Stop reason: SDUPTHER

## 2023-10-05 RX ORDER — METOPROLOL SUCCINATE 25 MG/1
25 TABLET, EXTENDED RELEASE ORAL DAILY
Qty: 30 TABLET | Refills: 11 | Status: SHIPPED | OUTPATIENT
Start: 2023-10-05

## 2023-10-05 RX ORDER — CLOPIDOGREL BISULFATE 75 MG/1
75 TABLET ORAL DAILY
Qty: 30 TABLET | Refills: 11 | Status: SHIPPED | OUTPATIENT
Start: 2023-10-05

## 2023-10-05 RX ORDER — METOPROLOL SUCCINATE 25 MG/1
25 TABLET, EXTENDED RELEASE ORAL DAILY
Qty: 30 TABLET | Refills: 11 | Status: SHIPPED | OUTPATIENT
Start: 2023-10-05 | End: 2023-10-05 | Stop reason: SDUPTHER

## 2023-10-05 RX ORDER — ISOSORBIDE MONONITRATE 30 MG/1
30 TABLET, EXTENDED RELEASE ORAL DAILY
Status: DISCONTINUED | OUTPATIENT
Start: 2023-10-05 | End: 2023-10-05 | Stop reason: HOSPADM

## 2023-10-05 RX ADMIN — SODIUM CHLORIDE, PRESERVATIVE FREE 10 ML: 5 INJECTION INTRAVENOUS at 09:25

## 2023-10-05 RX ADMIN — FINASTERIDE 5 MG: 5 TABLET, FILM COATED ORAL at 09:20

## 2023-10-05 RX ADMIN — PANTOPRAZOLE SODIUM 40 MG: 40 TABLET, DELAYED RELEASE ORAL at 07:49

## 2023-10-05 RX ADMIN — ASPIRIN 81 MG: 81 TABLET, COATED ORAL at 09:20

## 2023-10-05 RX ADMIN — METOPROLOL SUCCINATE 25 MG: 25 TABLET, FILM COATED, EXTENDED RELEASE ORAL at 09:20

## 2023-10-05 RX ADMIN — LISINOPRIL 10 MG: 10 TABLET ORAL at 09:20

## 2023-10-05 RX ADMIN — ISOSORBIDE MONONITRATE 30 MG: 30 TABLET, EXTENDED RELEASE ORAL at 09:20

## 2023-10-05 RX ADMIN — GABAPENTIN 600 MG: 300 CAPSULE ORAL at 09:21

## 2023-10-05 RX ADMIN — TAMSULOSIN HYDROCHLORIDE 0.4 MG: 0.4 CAPSULE ORAL at 09:20

## 2023-10-05 RX ADMIN — SODIUM CHLORIDE, PRESERVATIVE FREE 10 ML: 5 INJECTION INTRAVENOUS at 09:21

## 2023-10-05 RX ADMIN — CLOPIDOGREL BISULFATE 75 MG: 75 TABLET ORAL at 09:21

## 2023-10-05 ASSESSMENT — PAIN SCALES - GENERAL
PAINLEVEL_OUTOF10: 0
PAINLEVEL_OUTOF10: 0

## 2023-10-05 NOTE — PROGRESS NOTES
2015 Pt ambulated in the hallway with RN. Activity tolerated well, no c/o CP or SOB. Pt returned to bed.

## 2023-10-05 NOTE — PROGRESS NOTES
Discharge instructions discussed with patient. All questions answered. Patient verbalized understanding. Radial Cath site CDI, no hematoma. Care instructions and restrictions reviewed. Patient instructed to make follow up appts per discharge instructions. Patient signed discharge instructions after reviewing them and duplicate copy placed in chart. Belongings gathered and accounted for. Telemetry monitor and IV removed. Tolerating ambulation in room and hallway. Denies CP, SOB, or dizziness. Escorted out via w/c, discharged home with family in a private vehicle.          [facility] Rxs cancelled and rxs sent to [facility] on [address] per patient request.

## 2023-10-05 NOTE — CARDIO/PULMONARY
Chart reviewed: Patient is 80 y.o. male admitted with Unstable angina ([address]) [I20.0]    Education: CAD education folder given to [facility]. .      Educated using teach back method. Reviewed CAD diagnosis definition and purpose of intervention. Discussed risk factors for CAD to include the following: family history, elevated BMI, hyperlipidemia, hypertension, diabetes, stress, and smoking. Smoking Cessation Program link added to AVS. Discussed Heart Healthy/Low Sodium (2000 mg) diet. Reviewed the importance of medication compliance. Discussed follow up appointments with cardiologist, signs and symptoms of angina, and what to report to physician after discharge. Emphasized the value of cardiac rehab. Discussed Cardiac Rehab Program format, benefits, and encouraged enrollment to assist with risk modification and management. Initial Cardiac Rehab Program intake appointment scheduled for 11/7/23 and appointment information is on the AVS.    [facility]. verbalized understanding with questions answered.      Hermes Oliver RN

## 2023-10-05 NOTE — PROGRESS NOTES
[facility]     Progress Note      10/5/2023 6:55 AM  NAME: Candy Dias Sr. MRN:  [id number]   Admit Diagnosis: Unstable angina ([address] [facility]) [I20.0]     Problem List:     - chest pain unstable angina    - CAD with hx of PCI of Lcx in 2001, cath in 2009 negative, Cath 10/2023 with PCI of LAD with Roto then KARLENE, PCI of Lcx with KARLENE, diseased rPL x 2 will be treated medically initially due to small size and tortous RCA. - Echo 10/2023  EF 50%  - Incomplete LBBB  - HTN  - DM with neuropathy  - Dyslipidemia with LDL of 29  - PVD with right CEA, right toe amputation Dr Armando Epps  - CKD with Cr of 1.6  - BPH  - Hx of Etoh cirrhosis with plt of 129  - Quit tobacco in 2005, Quit Etoh  , currently engaged, works in  [address], ADLs occ uses a cane    Prior cardiologist: Myriam          Assessment/Plan:     - Chest pain, negative hs troponin x 3  - Euvolemic  - Sinus  - CTA negative for PE    - Cath with PCI of LAD and LCx    - Cont asa, plavix  - Cont added metoprolol  - Change NTP to imdur 30mg  - Resume lisinopril  - Cont jardiance  - Cont rosuvastatin    Cardiac rehab    Goal home today    Follow up in one month          [x]       High complexity decision making was performed in this patient at high risk for decompensation with multiple organ involvement. We discussed the expected course, resolution and complications of the diagnoses in detail. Medication risk, benefits, costs, interactions, and alternatives were discussed as indicated. I advised him to contact the office if his condition worsens, changes or fails to improve as anticipated. Patient expressed understanding with the diagnoses  and plan. Subjective:     Anjel Donaldson denies chest pain, dyspnea. Discussed with RN events overnight.      Review of Systems:    Symptom Y/N Comments  Symptom Y/N Comments   Fever/Chills N   Chest Pain N    Poor Appetite N   Edema N    Cough N   Abdominal Pain N    Sputum N   Joint Pain N

## 2023-10-05 NOTE — PROGRESS NOTES
Attempted to schedule hospital follow up for PCP appointment. Sent a message to PCP office to find patient an appointment. PCP RN call the patient to schedule the hospital follow up.  [address] Assistant

## 2023-10-05 NOTE — PLAN OF CARE
Problem: Discharge Planning  Goal: Discharge to home or other facility with appropriate resources  10/5/2023 1149 by Elpidio Oliver RN  Outcome: Adequate for Discharge  10/5/2023 0156 by Carlos Cedeno RN  Outcome: Progressing     Problem: Pain  Goal: Verbalizes/displays adequate comfort level or baseline comfort level  10/5/2023 1149 by Elpidio Oliver RN  Outcome: Adequate for Discharge  10/5/2023 0156 by Carlos Cedeno RN  Outcome: Progressing     Problem: ABCDS Injury Assessment  Goal: Absence of physical injury  10/5/2023 1149 by Elpidio Oliver RN  Outcome: Adequate for Discharge  10/5/2023 0156 by Carlos Cedeno RN  Outcome: Progressing     Problem: Safety - Adult  Goal: Free from fall injury  10/5/2023 1149 by Elpidio Oliver RN  Outcome: Adequate for Discharge  10/5/2023 0156 by Carlos Cedeno RN  Outcome: Progressing     Problem: Chronic Conditions and Co-morbidities  Goal: Patient's chronic conditions and co-morbidity symptoms are monitored and maintained or improved  10/5/2023 1149 by Elpidio Oliver RN  Outcome: Adequate for Discharge  10/5/2023 0156 by Carlos Cedeno RN  Outcome: Progressing

## 2023-10-05 NOTE — DISCHARGE SUMMARY
Discharge Summary    Name: Sharmin Putnam  [id number]  YOB: 1940 (Age: 80 y.o.)   Date of Admission: 10/2/2023  Date of Discharge: 10/5/2023  Attending Physician: Pernell Donis MD    Discharge Diagnosis:   Chest pain POA  Suspect unstable angina/ACS POA- s/p cath/stent this admission in LAD- Imdur added, Cont AS/plavix, lisinopril, jardiance, crestor, metoprolol, OP followup in 1 month  History of coronary artery disease s/p stenting POA  CKD 3  Bibasilar atelectasis with small left-sided pleural effusion  DM  Hypertension  BPH  Diabetic neuropathy  Dyslipidemia  Mild total hyperbilirubinemia   Likely due to alcoholic liver cirrhosis history- FU at [facility]  Full code      Consultations:  IP CONSULT TO CARDIOLOGY  IP CONSULT TO CARDIAC REHAB      Brief Admission History/Reason for Admission Per Paulo Albarado MD:   \" 80 y.o.  male with PMHx significant for coronary artery disease s/p stenting, diabetes mellitus, hypertension, BPH, neuropathy is coming the hospital chief complaint of chest pain. Patient reports pain is mostly in the substernal region, pressure-like pain, radiating to the back, without any aggravating or relieving factors. He also reports some associated shortness of breath as well. No cough or phlegm. Does not report any fever or chills. Does not report any abdominal pain, nausea or vomiting. On arrival to ED, noted to be tachycardic, blood pressure is 167/76. On labs sodium is 135, creatinine 1.74, LFTs are normal.  Total bilirubin is 1.5. CBC is within normal limits. CT angiogram of the chest shows no evidence of aortic dissection or aneurysm with no pulmonary embolism and shows mild left basilar atelectasis with trace left-sided pleural effusion. We were asked to admit for work up and evaluation of the above problems.  \"    Brief Hospital Course by Main Problems:   Chest pain POA  Suspect unstable angina/ACS POA  History of

## 2023-10-08 LAB
BACTERIA SPEC CULT: NORMAL
BACTERIA SPEC CULT: NORMAL
SERVICE CMNT-IMP: NORMAL
SERVICE CMNT-IMP: NORMAL

## 2023-10-31 ENCOUNTER — APPOINTMENT (OUTPATIENT)
Facility: HOSPITAL | Age: 83
DRG: 194 | End: 2023-10-31
Payer: MEDICARE

## 2023-10-31 ENCOUNTER — HOSPITAL ENCOUNTER (INPATIENT)
Facility: HOSPITAL | Age: 83
LOS: 6 days | Discharge: HOME | DRG: 194 | End: 2023-11-06
Attending: EMERGENCY MEDICINE | Admitting: INTERNAL MEDICINE
Payer: MEDICARE

## 2023-10-31 DIAGNOSIS — I16.0 HYPERTENSIVE URGENCY: ICD-10-CM

## 2023-10-31 DIAGNOSIS — I25.10 CORONARY ARTERY DISEASE INVOLVING NATIVE CORONARY ARTERY OF NATIVE HEART, UNSPECIFIED WHETHER ANGINA PRESENT: ICD-10-CM

## 2023-10-31 DIAGNOSIS — I20.0 UNSTABLE ANGINA (HCC): Primary | ICD-10-CM

## 2023-10-31 LAB
ALBUMIN SERPL-MCNC: 3.8 G/DL (ref 3.5–5)
ALBUMIN/GLOB SERPL: 0.9 (ref 1.1–2.2)
ALP SERPL-CCNC: 65 U/L (ref 45–117)
ALT SERPL-CCNC: 19 U/L (ref 12–78)
ANION GAP SERPL CALC-SCNC: 5 MMOL/L (ref 5–15)
AST SERPL-CCNC: 16 U/L (ref 15–37)
BASOPHILS # BLD: 0.1 K/UL (ref 0–0.1)
BASOPHILS NFR BLD: 1 % (ref 0–1)
BILIRUB SERPL-MCNC: 1.1 MG/DL (ref 0.2–1)
BUN SERPL-MCNC: 36 MG/DL (ref 6–20)
BUN/CREAT SERPL: 19 (ref 12–20)
CALCIUM SERPL-MCNC: 9.3 MG/DL (ref 8.5–10.1)
CHLORIDE SERPL-SCNC: 108 MMOL/L (ref 97–108)
CO2 SERPL-SCNC: 27 MMOL/L (ref 21–32)
CREAT SERPL-MCNC: 1.89 MG/DL (ref 0.7–1.3)
DIFFERENTIAL METHOD BLD: ABNORMAL
EOSINOPHIL # BLD: 0.4 K/UL (ref 0–0.4)
EOSINOPHIL NFR BLD: 4 % (ref 0–7)
ERYTHROCYTE [DISTWIDTH] IN BLOOD BY AUTOMATED COUNT: 12.8 % (ref 11.5–14.5)
GLOBULIN SER CALC-MCNC: 4.4 G/DL (ref 2–4)
GLUCOSE BLD STRIP.AUTO-MCNC: 139 MG/DL (ref 65–117)
GLUCOSE SERPL-MCNC: 218 MG/DL (ref 65–100)
HCT VFR BLD AUTO: 38 % (ref 36.6–50.3)
HGB BLD-MCNC: 12.7 G/DL (ref 12.1–17)
IMM GRANULOCYTES # BLD AUTO: 0 K/UL (ref 0–0.04)
IMM GRANULOCYTES NFR BLD AUTO: 0 % (ref 0–0.5)
LYMPHOCYTES # BLD: 1.1 K/UL (ref 0.8–3.5)
LYMPHOCYTES NFR BLD: 12 % (ref 12–49)
MCH RBC QN AUTO: 31.4 PG (ref 26–34)
MCHC RBC AUTO-ENTMCNC: 33.4 G/DL (ref 30–36.5)
MCV RBC AUTO: 93.8 FL (ref 80–99)
MONOCYTES # BLD: 0.6 K/UL (ref 0–1)
MONOCYTES NFR BLD: 6 % (ref 5–13)
NEUTS SEG # BLD: 7.1 K/UL (ref 1.8–8)
NEUTS SEG NFR BLD: 77 % (ref 32–75)
NRBC # BLD: 0 K/UL (ref 0–0.01)
NRBC BLD-RTO: 0 PER 100 WBC
PLATELET # BLD AUTO: 149 K/UL (ref 150–400)
PMV BLD AUTO: 9.7 FL (ref 8.9–12.9)
POTASSIUM SERPL-SCNC: 5.2 MMOL/L (ref 3.5–5.1)
PROT SERPL-MCNC: 8.2 G/DL (ref 6.4–8.2)
RBC # BLD AUTO: 4.05 M/UL (ref 4.1–5.7)
SERVICE CMNT-IMP: ABNORMAL
SODIUM SERPL-SCNC: 140 MMOL/L (ref 136–145)
TROPONIN I SERPL HS-MCNC: 11 NG/L (ref 0–76)
TROPONIN I SERPL HS-MCNC: 12 NG/L (ref 0–76)
WBC # BLD AUTO: 9.3 K/UL (ref 4.1–11.1)

## 2023-10-31 PROCEDURE — 6370000000 HC RX 637 (ALT 250 FOR IP): Performed by: INTERNAL MEDICINE

## 2023-10-31 PROCEDURE — 84484 ASSAY OF TROPONIN QUANT: CPT

## 2023-10-31 PROCEDURE — 99285 EMERGENCY DEPT VISIT HI MDM: CPT

## 2023-10-31 PROCEDURE — 93005 ELECTROCARDIOGRAM TRACING: CPT | Performed by: EMERGENCY MEDICINE

## 2023-10-31 PROCEDURE — 36415 COLL VENOUS BLD VENIPUNCTURE: CPT

## 2023-10-31 PROCEDURE — 1100000000 HC RM PRIVATE

## 2023-10-31 PROCEDURE — 71045 X-RAY EXAM CHEST 1 VIEW: CPT

## 2023-10-31 PROCEDURE — 85025 COMPLETE CBC W/AUTO DIFF WBC: CPT

## 2023-10-31 PROCEDURE — 82962 GLUCOSE BLOOD TEST: CPT

## 2023-10-31 PROCEDURE — 80053 COMPREHEN METABOLIC PANEL: CPT

## 2023-10-31 PROCEDURE — 2580000003 HC RX 258: Performed by: INTERNAL MEDICINE

## 2023-10-31 PROCEDURE — 1100000003 HC PRIVATE W/ TELEMETRY

## 2023-10-31 PROCEDURE — 6370000000 HC RX 637 (ALT 250 FOR IP): Performed by: EMERGENCY MEDICINE

## 2023-10-31 RX ORDER — ACETAMINOPHEN 325 MG/1
650 TABLET ORAL EVERY 6 HOURS PRN
Status: DISCONTINUED | OUTPATIENT
Start: 2023-10-31 | End: 2023-11-06 | Stop reason: HOSPADM

## 2023-10-31 RX ORDER — FINASTERIDE 5 MG/1
5 TABLET, FILM COATED ORAL DAILY
Status: DISCONTINUED | OUTPATIENT
Start: 2023-11-01 | End: 2023-11-06 | Stop reason: HOSPADM

## 2023-10-31 RX ORDER — INSULIN LISPRO 100 [IU]/ML
0-8 INJECTION, SOLUTION INTRAVENOUS; SUBCUTANEOUS
Status: DISCONTINUED | OUTPATIENT
Start: 2023-11-01 | End: 2023-11-06 | Stop reason: HOSPADM

## 2023-10-31 RX ORDER — TAMSULOSIN HYDROCHLORIDE 0.4 MG/1
0.4 CAPSULE ORAL DAILY
Status: DISCONTINUED | OUTPATIENT
Start: 2023-11-01 | End: 2023-11-06 | Stop reason: HOSPADM

## 2023-10-31 RX ORDER — LISINOPRIL 10 MG/1
10 TABLET ORAL DAILY
Status: DISCONTINUED | OUTPATIENT
Start: 2023-11-01 | End: 2023-11-06 | Stop reason: HOSPADM

## 2023-10-31 RX ORDER — SODIUM CHLORIDE 9 MG/ML
INJECTION, SOLUTION INTRAVENOUS PRN
Status: DISCONTINUED | OUTPATIENT
Start: 2023-10-31 | End: 2023-11-06 | Stop reason: HOSPADM

## 2023-10-31 RX ORDER — ONDANSETRON 2 MG/ML
4 INJECTION INTRAMUSCULAR; INTRAVENOUS EVERY 6 HOURS PRN
Status: DISCONTINUED | OUTPATIENT
Start: 2023-10-31 | End: 2023-11-06 | Stop reason: HOSPADM

## 2023-10-31 RX ORDER — GABAPENTIN 300 MG/1
600 CAPSULE ORAL 2 TIMES DAILY
Status: DISCONTINUED | OUTPATIENT
Start: 2023-10-31 | End: 2023-11-06 | Stop reason: HOSPADM

## 2023-10-31 RX ORDER — ONDANSETRON 4 MG/1
4 TABLET, ORALLY DISINTEGRATING ORAL EVERY 8 HOURS PRN
Status: DISCONTINUED | OUTPATIENT
Start: 2023-10-31 | End: 2023-11-06 | Stop reason: HOSPADM

## 2023-10-31 RX ORDER — ROSUVASTATIN CALCIUM 20 MG/1
20 TABLET, COATED ORAL NIGHTLY
Status: DISCONTINUED | OUTPATIENT
Start: 2023-11-01 | End: 2023-11-03

## 2023-10-31 RX ORDER — INSULIN LISPRO 100 [IU]/ML
0-4 INJECTION, SOLUTION INTRAVENOUS; SUBCUTANEOUS NIGHTLY
Status: DISCONTINUED | OUTPATIENT
Start: 2023-10-31 | End: 2023-11-06 | Stop reason: HOSPADM

## 2023-10-31 RX ORDER — ISOSORBIDE MONONITRATE 30 MG/1
30 TABLET, EXTENDED RELEASE ORAL DAILY
Status: DISCONTINUED | OUTPATIENT
Start: 2023-11-01 | End: 2023-11-02

## 2023-10-31 RX ORDER — ASPIRIN 81 MG/1
81 TABLET ORAL DAILY
Status: DISCONTINUED | OUTPATIENT
Start: 2023-11-01 | End: 2023-11-06 | Stop reason: HOSPADM

## 2023-10-31 RX ORDER — NITROGLYCERIN 0.4 MG/1
0.4 TABLET SUBLINGUAL ONCE
Status: COMPLETED | OUTPATIENT
Start: 2023-10-31 | End: 2023-10-31

## 2023-10-31 RX ORDER — PANTOPRAZOLE SODIUM 40 MG/1
40 TABLET, DELAYED RELEASE ORAL
Status: DISCONTINUED | OUTPATIENT
Start: 2023-11-01 | End: 2023-11-06 | Stop reason: HOSPADM

## 2023-10-31 RX ORDER — SODIUM CHLORIDE 0.9 % (FLUSH) 0.9 %
5-40 SYRINGE (ML) INJECTION PRN
Status: DISCONTINUED | OUTPATIENT
Start: 2023-10-31 | End: 2023-11-06 | Stop reason: HOSPADM

## 2023-10-31 RX ORDER — SODIUM CHLORIDE 0.9 % (FLUSH) 0.9 %
5-40 SYRINGE (ML) INJECTION EVERY 12 HOURS SCHEDULED
Status: DISCONTINUED | OUTPATIENT
Start: 2023-10-31 | End: 2023-11-06 | Stop reason: HOSPADM

## 2023-10-31 RX ORDER — CLOPIDOGREL BISULFATE 75 MG/1
75 TABLET ORAL DAILY
Status: DISCONTINUED | OUTPATIENT
Start: 2023-11-01 | End: 2023-11-06 | Stop reason: HOSPADM

## 2023-10-31 RX ORDER — ACETAMINOPHEN 650 MG/1
650 SUPPOSITORY RECTAL EVERY 6 HOURS PRN
Status: DISCONTINUED | OUTPATIENT
Start: 2023-10-31 | End: 2023-11-06 | Stop reason: HOSPADM

## 2023-10-31 RX ORDER — ENOXAPARIN SODIUM 100 MG/ML
30 INJECTION SUBCUTANEOUS DAILY
Status: DISCONTINUED | OUTPATIENT
Start: 2023-11-01 | End: 2023-11-06 | Stop reason: HOSPADM

## 2023-10-31 RX ORDER — ASPIRIN 81 MG/1
162 TABLET, CHEWABLE ORAL ONCE
Status: COMPLETED | OUTPATIENT
Start: 2023-10-31 | End: 2023-10-31

## 2023-10-31 RX ORDER — POLYETHYLENE GLYCOL 3350 17 G/17G
17 POWDER, FOR SOLUTION ORAL DAILY PRN
Status: DISCONTINUED | OUTPATIENT
Start: 2023-10-31 | End: 2023-11-06 | Stop reason: HOSPADM

## 2023-10-31 RX ORDER — METOPROLOL SUCCINATE 25 MG/1
25 TABLET, EXTENDED RELEASE ORAL DAILY
Status: DISCONTINUED | OUTPATIENT
Start: 2023-11-01 | End: 2023-11-06 | Stop reason: HOSPADM

## 2023-10-31 RX ADMIN — ASPIRIN 162 MG: 81 TABLET, CHEWABLE ORAL at 20:30

## 2023-10-31 RX ADMIN — NITROGLYCERIN 0.4 MG: 0.4 TABLET, ORALLY DISINTEGRATING SUBLINGUAL at 21:12

## 2023-10-31 RX ADMIN — SODIUM CHLORIDE, PRESERVATIVE FREE 10 ML: 5 INJECTION INTRAVENOUS at 23:28

## 2023-10-31 RX ADMIN — GABAPENTIN 600 MG: 300 CAPSULE ORAL at 23:23

## 2023-10-31 ASSESSMENT — ENCOUNTER SYMPTOMS
VOMITING: 0
NAUSEA: 0
BACK PAIN: 1
ABDOMINAL PAIN: 0
COUGH: 0
SHORTNESS OF BREATH: 0
COLOR CHANGE: 0

## 2023-10-31 ASSESSMENT — PAIN SCALES - GENERAL: PAINLEVEL_OUTOF10: 4

## 2023-11-01 ENCOUNTER — APPOINTMENT (OUTPATIENT)
Facility: HOSPITAL | Age: 83
DRG: 194 | End: 2023-11-01
Payer: MEDICARE

## 2023-11-01 ENCOUNTER — APPOINTMENT (OUTPATIENT)
Facility: HOSPITAL | Age: 83
DRG: 194 | End: 2023-11-01
Attending: STUDENT IN AN ORGANIZED HEALTH CARE EDUCATION/TRAINING PROGRAM
Payer: MEDICARE

## 2023-11-01 LAB
ALBUMIN SERPL-MCNC: 3.2 G/DL (ref 3.5–5)
ALBUMIN/GLOB SERPL: 0.8 (ref 1.1–2.2)
ALP SERPL-CCNC: 58 U/L (ref 45–117)
ALT SERPL-CCNC: 22 U/L (ref 12–78)
ANION GAP SERPL CALC-SCNC: 7 MMOL/L (ref 5–15)
AST SERPL-CCNC: 24 U/L (ref 15–37)
BILIRUB SERPL-MCNC: 1.3 MG/DL (ref 0.2–1)
BUN SERPL-MCNC: 39 MG/DL (ref 6–20)
BUN/CREAT SERPL: 20 (ref 12–20)
CALCIUM SERPL-MCNC: 8.8 MG/DL (ref 8.5–10.1)
CHLORIDE SERPL-SCNC: 111 MMOL/L (ref 97–108)
CO2 SERPL-SCNC: 21 MMOL/L (ref 21–32)
CREAT SERPL-MCNC: 1.98 MG/DL (ref 0.7–1.3)
D DIMER PPP FEU-MCNC: 0.93 MG/L FEU (ref 0–0.65)
ECHO AO ASC DIAM: 2.6 CM
ECHO AO ASCENDING AORTA INDEX: 1.33 CM/M2
ECHO AV AREA PEAK VELOCITY: 2.1 CM2
ECHO AV AREA VTI: 2.3 CM2
ECHO AV AREA/BSA PEAK VELOCITY: 1.1 CM2/M2
ECHO AV AREA/BSA VTI: 1.2 CM2/M2
ECHO AV MEAN GRADIENT: 8 MMHG
ECHO AV MEAN VELOCITY: 1.4 M/S
ECHO AV PEAK GRADIENT: 14 MMHG
ECHO AV PEAK VELOCITY: 1.9 M/S
ECHO AV VELOCITY RATIO: 0.58
ECHO AV VTI: 36.7 CM
ECHO BSA: 1.98 M2
ECHO LA DIAMETER INDEX: 2 CM/M2
ECHO LA DIAMETER: 3.9 CM
ECHO LA VOL A-L A2C: 46 ML (ref 18–58)
ECHO LA VOL A-L A2C: 46 ML (ref 18–58)
ECHO LA VOL A-L A4C: 46 ML (ref 18–58)
ECHO LA VOL A-L A4C: 49 ML (ref 18–58)
ECHO LA VOLUME AREA LENGTH: 50 ML
ECHO LA VOLUME INDEX AREA LENGTH: 26 ML/M2 (ref 16–34)
ECHO LV E' LATERAL VELOCITY: 6 CM/S
ECHO LV E' SEPTAL VELOCITY: 7 CM/S
ECHO LV EDV A4C: 59 ML
ECHO LV EDV INDEX A4C: 30 ML/M2
ECHO LV EJECTION FRACTION A4C: 59 %
ECHO LV ESV A4C: 25 ML
ECHO LV ESV INDEX A4C: 13 ML/M2
ECHO LV FRACTIONAL SHORTENING: 31 % (ref 28–44)
ECHO LV INTERNAL DIMENSION DIASTOLE INDEX: 2.15 CM/M2
ECHO LV INTERNAL DIMENSION DIASTOLIC: 4.2 CM (ref 4.2–5.9)
ECHO LV INTERNAL DIMENSION SYSTOLIC INDEX: 1.49 CM/M2
ECHO LV INTERNAL DIMENSION SYSTOLIC: 2.9 CM
ECHO LV IVSD: 1 CM (ref 0.6–1)
ECHO LV MASS 2D: 147 G (ref 88–224)
ECHO LV MASS INDEX 2D: 75.4 G/M2 (ref 49–115)
ECHO LV POSTERIOR WALL DIASTOLIC: 1.1 CM (ref 0.6–1)
ECHO LV RELATIVE WALL THICKNESS RATIO: 0.52
ECHO LVOT AREA: 3.8 CM2
ECHO LVOT AV VTI INDEX: 0.62
ECHO LVOT DIAM: 2.2 CM
ECHO LVOT MEAN GRADIENT: 3 MMHG
ECHO LVOT PEAK GRADIENT: 5 MMHG
ECHO LVOT PEAK VELOCITY: 1.1 M/S
ECHO LVOT STROKE VOLUME INDEX: 44 ML/M2
ECHO LVOT SV: 85.9 ML
ECHO LVOT VTI: 22.6 CM
ECHO MV A VELOCITY: 1.09 M/S
ECHO MV AREA VTI: 4 CM2
ECHO MV E DECELERATION TIME (DT): 277.2 MS
ECHO MV E VELOCITY: 0.72 M/S
ECHO MV E/A RATIO: 0.66
ECHO MV E/E' LATERAL: 12
ECHO MV E/E' RATIO (AVERAGED): 11.14
ECHO MV E/E' SEPTAL: 10.29
ECHO MV LVOT VTI INDEX: 0.94
ECHO MV MAX VELOCITY: 1.2 M/S
ECHO MV MEAN GRADIENT: 2 MMHG
ECHO MV MEAN VELOCITY: 0.6 M/S
ECHO MV PEAK GRADIENT: 5 MMHG
ECHO MV VTI: 21.3 CM
ECHO RV FREE WALL PEAK S': 14 CM/S
ECHO RV TAPSE: 2.1 CM (ref 1.7–?)
ECHO TV REGURGITANT MAX VELOCITY: 2.62 M/S
ECHO TV REGURGITANT PEAK GRADIENT: 28 MMHG
EKG ATRIAL RATE: 87 BPM
EKG DIAGNOSIS: NORMAL
EKG Q-T INTERVAL: 348 MS
EKG QRS DURATION: 106 MS
EKG QTC CALCULATION (BAZETT): 423 MS
EKG R AXIS: -28 DEGREES
EKG T AXIS: 148 DEGREES
EKG VENTRICULAR RATE: 89 BPM
GLOBULIN SER CALC-MCNC: 4 G/DL (ref 2–4)
GLUCOSE BLD STRIP.AUTO-MCNC: 121 MG/DL (ref 65–117)
GLUCOSE BLD STRIP.AUTO-MCNC: 148 MG/DL (ref 65–117)
GLUCOSE BLD STRIP.AUTO-MCNC: 163 MG/DL (ref 65–117)
GLUCOSE BLD STRIP.AUTO-MCNC: 97 MG/DL (ref 65–117)
GLUCOSE SERPL-MCNC: 124 MG/DL (ref 65–100)
NT PRO BNP: 631 PG/ML
POTASSIUM SERPL-SCNC: 5.4 MMOL/L (ref 3.5–5.1)
PROCALCITONIN SERPL-MCNC: 0.42 NG/ML
PROT SERPL-MCNC: 7.2 G/DL (ref 6.4–8.2)
SERVICE CMNT-IMP: ABNORMAL
SERVICE CMNT-IMP: NORMAL
SODIUM SERPL-SCNC: 139 MMOL/L (ref 136–145)
TROPONIN I SERPL HS-MCNC: 11 NG/L (ref 0–76)

## 2023-11-01 PROCEDURE — 6370000000 HC RX 637 (ALT 250 FOR IP): Performed by: STUDENT IN AN ORGANIZED HEALTH CARE EDUCATION/TRAINING PROGRAM

## 2023-11-01 PROCEDURE — 71250 CT THORAX DX C-: CPT

## 2023-11-01 PROCEDURE — 3430000000 HC RX DIAGNOSTIC RADIOPHARMACEUTICAL: Performed by: INTERNAL MEDICINE

## 2023-11-01 PROCEDURE — 6370000000 HC RX 637 (ALT 250 FOR IP): Performed by: INTERNAL MEDICINE

## 2023-11-01 PROCEDURE — A9540 TC99M MAA: HCPCS | Performed by: INTERNAL MEDICINE

## 2023-11-01 PROCEDURE — 6360000002 HC RX W HCPCS: Performed by: INTERNAL MEDICINE

## 2023-11-01 PROCEDURE — 84484 ASSAY OF TROPONIN QUANT: CPT

## 2023-11-01 PROCEDURE — 84145 PROCALCITONIN (PCT): CPT

## 2023-11-01 PROCEDURE — 1100000003 HC PRIVATE W/ TELEMETRY

## 2023-11-01 PROCEDURE — 85379 FIBRIN DEGRADATION QUANT: CPT

## 2023-11-01 PROCEDURE — 78580 LUNG PERFUSION IMAGING: CPT

## 2023-11-01 PROCEDURE — 82962 GLUCOSE BLOOD TEST: CPT

## 2023-11-01 PROCEDURE — 83880 ASSAY OF NATRIURETIC PEPTIDE: CPT

## 2023-11-01 PROCEDURE — 2580000003 HC RX 258: Performed by: INTERNAL MEDICINE

## 2023-11-01 PROCEDURE — 93306 TTE W/DOPPLER COMPLETE: CPT

## 2023-11-01 PROCEDURE — 36415 COLL VENOUS BLD VENIPUNCTURE: CPT

## 2023-11-01 PROCEDURE — 80053 COMPREHEN METABOLIC PANEL: CPT

## 2023-11-01 RX ORDER — MORPHINE SULFATE 2 MG/ML
1 INJECTION, SOLUTION INTRAMUSCULAR; INTRAVENOUS
Status: ACTIVE | OUTPATIENT
Start: 2023-11-01 | End: 2023-11-01

## 2023-11-01 RX ORDER — RANOLAZINE 500 MG/1
500 TABLET, EXTENDED RELEASE ORAL 2 TIMES DAILY
Status: DISCONTINUED | OUTPATIENT
Start: 2023-11-01 | End: 2023-11-06 | Stop reason: HOSPADM

## 2023-11-01 RX ADMIN — KIT FOR THE PREPARATION OF TECHNETIUM TC 99M ALBUMIN AGGREGATED 4.4 MILLICURIE: 2.5 INJECTION, POWDER, FOR SOLUTION INTRAVENOUS at 14:55

## 2023-11-01 RX ADMIN — SODIUM CHLORIDE, PRESERVATIVE FREE 10 ML: 5 INJECTION INTRAVENOUS at 10:19

## 2023-11-01 RX ADMIN — ENOXAPARIN SODIUM 30 MG: 100 INJECTION SUBCUTANEOUS at 10:42

## 2023-11-01 RX ADMIN — ROSUVASTATIN 20 MG: 20 TABLET, FILM COATED ORAL at 21:46

## 2023-11-01 RX ADMIN — RANOLAZINE 500 MG: 500 TABLET, EXTENDED RELEASE ORAL at 21:46

## 2023-11-01 RX ADMIN — GABAPENTIN 600 MG: 300 CAPSULE ORAL at 10:17

## 2023-11-01 RX ADMIN — CLOPIDOGREL BISULFATE 75 MG: 75 TABLET ORAL at 10:17

## 2023-11-01 RX ADMIN — ISOSORBIDE MONONITRATE 30 MG: 30 TABLET, EXTENDED RELEASE ORAL at 10:18

## 2023-11-01 RX ADMIN — METOPROLOL SUCCINATE 25 MG: 25 TABLET, EXTENDED RELEASE ORAL at 10:17

## 2023-11-01 RX ADMIN — GABAPENTIN 600 MG: 300 CAPSULE ORAL at 21:46

## 2023-11-01 RX ADMIN — ASPIRIN 81 MG: 81 TABLET, COATED ORAL at 10:17

## 2023-11-01 RX ADMIN — FINASTERIDE 5 MG: 5 TABLET, FILM COATED ORAL at 10:17

## 2023-11-01 RX ADMIN — TAMSULOSIN HYDROCHLORIDE 0.4 MG: 0.4 CAPSULE ORAL at 10:17

## 2023-11-01 RX ADMIN — SODIUM CHLORIDE, PRESERVATIVE FREE 10 ML: 5 INJECTION INTRAVENOUS at 21:46

## 2023-11-01 RX ADMIN — RANOLAZINE 500 MG: 500 TABLET, EXTENDED RELEASE ORAL at 10:42

## 2023-11-01 ASSESSMENT — PAIN DESCRIPTION - ORIENTATION: ORIENTATION: MID

## 2023-11-01 ASSESSMENT — PAIN DESCRIPTION - DESCRIPTORS: DESCRIPTORS: ACHING

## 2023-11-01 ASSESSMENT — PAIN DESCRIPTION - LOCATION: LOCATION: CHEST

## 2023-11-01 ASSESSMENT — PAIN DESCRIPTION - DIRECTION: RADIATING_TOWARDS: LEFT SIDE OF CHEST

## 2023-11-01 ASSESSMENT — PAIN SCALES - GENERAL
PAINLEVEL_OUTOF10: 0
PAINLEVEL_OUTOF10: 0
PAINLEVEL_OUTOF10: 4

## 2023-11-02 LAB
ANION GAP SERPL CALC-SCNC: 6 MMOL/L (ref 5–15)
BASOPHILS # BLD: 0 K/UL (ref 0–0.1)
BASOPHILS NFR BLD: 0 % (ref 0–1)
BUN SERPL-MCNC: 41 MG/DL (ref 6–20)
BUN/CREAT SERPL: 19 (ref 12–20)
CALCIUM SERPL-MCNC: 8.6 MG/DL (ref 8.5–10.1)
CHLORIDE SERPL-SCNC: 110 MMOL/L (ref 97–108)
CO2 SERPL-SCNC: 21 MMOL/L (ref 21–32)
CREAT SERPL-MCNC: 2.16 MG/DL (ref 0.7–1.3)
CRP SERPL-MCNC: 16.2 MG/DL (ref 0–0.6)
DIFFERENTIAL METHOD BLD: ABNORMAL
EOSINOPHIL # BLD: 0.3 K/UL (ref 0–0.4)
EOSINOPHIL #/AREA URNS HPF: NEGATIVE
EOSINOPHIL NFR BLD: 4 % (ref 0–7)
ERYTHROCYTE [DISTWIDTH] IN BLOOD BY AUTOMATED COUNT: 12.9 % (ref 11.5–14.5)
ERYTHROCYTE [SEDIMENTATION RATE] IN BLOOD: 77 MM/HR (ref 0–20)
GLUCOSE BLD STRIP.AUTO-MCNC: 184 MG/DL (ref 65–117)
GLUCOSE BLD STRIP.AUTO-MCNC: 206 MG/DL (ref 65–117)
GLUCOSE BLD STRIP.AUTO-MCNC: 217 MG/DL (ref 65–117)
GLUCOSE BLD STRIP.AUTO-MCNC: 240 MG/DL (ref 65–117)
GLUCOSE SERPL-MCNC: 131 MG/DL (ref 65–100)
HCT VFR BLD AUTO: 32.9 % (ref 36.6–50.3)
HGB BLD-MCNC: 11.1 G/DL (ref 12.1–17)
IMM GRANULOCYTES # BLD AUTO: 0 K/UL (ref 0–0.04)
IMM GRANULOCYTES NFR BLD AUTO: 0 % (ref 0–0.5)
LYMPHOCYTES # BLD: 1.2 K/UL (ref 0.8–3.5)
LYMPHOCYTES NFR BLD: 17 % (ref 12–49)
MCH RBC QN AUTO: 31.5 PG (ref 26–34)
MCHC RBC AUTO-ENTMCNC: 33.7 G/DL (ref 30–36.5)
MCV RBC AUTO: 93.5 FL (ref 80–99)
MONOCYTES # BLD: 0.7 K/UL (ref 0–1)
MONOCYTES NFR BLD: 10 % (ref 5–13)
NEUTS SEG # BLD: 4.8 K/UL (ref 1.8–8)
NEUTS SEG NFR BLD: 69 % (ref 32–75)
NRBC # BLD: 0 K/UL (ref 0–0.01)
NRBC BLD-RTO: 0 PER 100 WBC
PLATELET # BLD AUTO: 133 K/UL (ref 150–400)
PMV BLD AUTO: 9.8 FL (ref 8.9–12.9)
POTASSIUM SERPL-SCNC: 4.8 MMOL/L (ref 3.5–5.1)
RBC # BLD AUTO: 3.52 M/UL (ref 4.1–5.7)
SERVICE CMNT-IMP: ABNORMAL
SODIUM SERPL-SCNC: 137 MMOL/L (ref 136–145)
WBC # BLD AUTO: 6.9 K/UL (ref 4.1–11.1)

## 2023-11-02 PROCEDURE — 86140 C-REACTIVE PROTEIN: CPT

## 2023-11-02 PROCEDURE — 85025 COMPLETE CBC W/AUTO DIFF WBC: CPT

## 2023-11-02 PROCEDURE — 36415 COLL VENOUS BLD VENIPUNCTURE: CPT

## 2023-11-02 PROCEDURE — 86160 COMPLEMENT ANTIGEN: CPT

## 2023-11-02 PROCEDURE — 82962 GLUCOSE BLOOD TEST: CPT

## 2023-11-02 PROCEDURE — 80048 BASIC METABOLIC PNL TOTAL CA: CPT

## 2023-11-02 PROCEDURE — 2580000003 HC RX 258: Performed by: INTERNAL MEDICINE

## 2023-11-02 PROCEDURE — 6370000000 HC RX 637 (ALT 250 FOR IP): Performed by: INTERNAL MEDICINE

## 2023-11-02 PROCEDURE — 6360000002 HC RX W HCPCS: Performed by: STUDENT IN AN ORGANIZED HEALTH CARE EDUCATION/TRAINING PROGRAM

## 2023-11-02 PROCEDURE — 85652 RBC SED RATE AUTOMATED: CPT

## 2023-11-02 PROCEDURE — 6360000002 HC RX W HCPCS: Performed by: INTERNAL MEDICINE

## 2023-11-02 PROCEDURE — 1100000003 HC PRIVATE W/ TELEMETRY

## 2023-11-02 PROCEDURE — 6370000000 HC RX 637 (ALT 250 FOR IP): Performed by: STUDENT IN AN ORGANIZED HEALTH CARE EDUCATION/TRAINING PROGRAM

## 2023-11-02 PROCEDURE — 87205 SMEAR GRAM STAIN: CPT

## 2023-11-02 RX ORDER — ISOSORBIDE MONONITRATE 30 MG/1
15 TABLET, EXTENDED RELEASE ORAL DAILY
Status: DISCONTINUED | OUTPATIENT
Start: 2023-11-02 | End: 2023-11-06 | Stop reason: HOSPADM

## 2023-11-02 RX ORDER — FUROSEMIDE 10 MG/ML
20 INJECTION INTRAMUSCULAR; INTRAVENOUS ONCE
Status: COMPLETED | OUTPATIENT
Start: 2023-11-02 | End: 2023-11-02

## 2023-11-02 RX ADMIN — ASPIRIN 81 MG: 81 TABLET, COATED ORAL at 09:28

## 2023-11-02 RX ADMIN — FINASTERIDE 5 MG: 5 TABLET, FILM COATED ORAL at 09:28

## 2023-11-02 RX ADMIN — GABAPENTIN 600 MG: 300 CAPSULE ORAL at 09:28

## 2023-11-02 RX ADMIN — INSULIN LISPRO 2 UNITS: 100 INJECTION, SOLUTION INTRAVENOUS; SUBCUTANEOUS at 09:30

## 2023-11-02 RX ADMIN — SODIUM CHLORIDE, PRESERVATIVE FREE 10 ML: 5 INJECTION INTRAVENOUS at 21:17

## 2023-11-02 RX ADMIN — PANTOPRAZOLE SODIUM 40 MG: 40 TABLET, DELAYED RELEASE ORAL at 05:53

## 2023-11-02 RX ADMIN — RANOLAZINE 500 MG: 500 TABLET, EXTENDED RELEASE ORAL at 09:28

## 2023-11-02 RX ADMIN — RANOLAZINE 500 MG: 500 TABLET, EXTENDED RELEASE ORAL at 21:15

## 2023-11-02 RX ADMIN — TAMSULOSIN HYDROCHLORIDE 0.4 MG: 0.4 CAPSULE ORAL at 09:28

## 2023-11-02 RX ADMIN — ROSUVASTATIN 20 MG: 20 TABLET, FILM COATED ORAL at 21:15

## 2023-11-02 RX ADMIN — ISOSORBIDE MONONITRATE 15 MG: 30 TABLET, EXTENDED RELEASE ORAL at 09:29

## 2023-11-02 RX ADMIN — SODIUM CHLORIDE, PRESERVATIVE FREE 10 ML: 5 INJECTION INTRAVENOUS at 09:35

## 2023-11-02 RX ADMIN — INSULIN LISPRO 2 UNITS: 100 INJECTION, SOLUTION INTRAVENOUS; SUBCUTANEOUS at 16:55

## 2023-11-02 RX ADMIN — CLOPIDOGREL BISULFATE 75 MG: 75 TABLET ORAL at 09:28

## 2023-11-02 RX ADMIN — GABAPENTIN 600 MG: 300 CAPSULE ORAL at 21:15

## 2023-11-02 RX ADMIN — METOPROLOL SUCCINATE 25 MG: 25 TABLET, EXTENDED RELEASE ORAL at 09:28

## 2023-11-02 RX ADMIN — FUROSEMIDE 20 MG: 10 INJECTION, SOLUTION INTRAMUSCULAR; INTRAVENOUS at 16:55

## 2023-11-02 RX ADMIN — ENOXAPARIN SODIUM 30 MG: 100 INJECTION SUBCUTANEOUS at 09:31

## 2023-11-02 RX ADMIN — INSULIN LISPRO 2 UNITS: 100 INJECTION, SOLUTION INTRAVENOUS; SUBCUTANEOUS at 11:59

## 2023-11-02 ASSESSMENT — PAIN - FUNCTIONAL ASSESSMENT: PAIN_FUNCTIONAL_ASSESSMENT: ACTIVITIES ARE NOT PREVENTED

## 2023-11-02 ASSESSMENT — PAIN SCALES - GENERAL
PAINLEVEL_OUTOF10: 0
PAINLEVEL_OUTOF10: 3
PAINLEVEL_OUTOF10: 0

## 2023-11-02 ASSESSMENT — PAIN DESCRIPTION - ONSET: ONSET: ON-GOING

## 2023-11-02 ASSESSMENT — PAIN DESCRIPTION - ORIENTATION: ORIENTATION: MID

## 2023-11-02 ASSESSMENT — PAIN DESCRIPTION - DESCRIPTORS: DESCRIPTORS: ACHING

## 2023-11-02 ASSESSMENT — PAIN DESCRIPTION - LOCATION: LOCATION: CHEST;BACK

## 2023-11-02 ASSESSMENT — PAIN DESCRIPTION - FREQUENCY: FREQUENCY: INTERMITTENT

## 2023-11-02 ASSESSMENT — PAIN DESCRIPTION - PAIN TYPE: TYPE: ACUTE PAIN

## 2023-11-02 NOTE — CONSULTS
IP Cardiology Consult       Date of consult:  11/01/23  Date of admission: 10/31/2023  Primary Cardiologist: Dr. Pardeep Tabor  Physician Requesting consult: Dr Hannah Lynne:    Chest pain with some atypical characteristics, worse with inspiration and also with cough, he reports similar to previous chest pain when he had PCI, however, he also reports this is similar to when he had pneumonia    Ruled out for ACS by cardiac enzymes troponin x2      Problem list:   CAD with hx of KARLENE, recent left heart catheterization on 10/2/2023 shows severe CAD involving LAD, LCx and right PL branch, s/p PCI of LAD and LCx. Medical therapy was recommended for right PL branch. 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 Recommendations:    Check D-dimer, check BNP and repeat troponin  Continue aspirin Plavix  Continue statin  Continue metoprolol and Imdur as blood pressure tolerate  I will plan on adding Ranexa for now, he did have right PL branch disease that is being treated with medical therapy  chest x-ray shows persistent increased density of left lung; Previous CTA on 10/2/2023 shows Mild left basilar atelectasis with trace left pleural effusion. We will check 2D echocardiogram to reassess LVEF      Given recent cath, CP being atypical, CKD and negative cardiac enzymes, will plan for medical therapy for now. No plan for cath.          [x]        High complexity decision making was performed      CC / Reason for
Rubs  [] yes  [] no       Gallops [] yes  [] no    Rate []  regular  []  irregular        carotid bruits  [] Right  []  Left                 LE edema [] yes  [x] no           JVP  []  yes   []  no    ABD:    [x] soft/non distended/non tender/+bowel sounds/no HSM    []  Rigid    tenderness [] yes [] no   Liver enlargement  []  yes []  no                Spleen enlargement  []  yes []  no     distended []  yes [] no     bowel sound  [] hypoactive   [] hyperactive    LYMPH:    Neck []  yes [x]  no       Axillae []  yes []  no    SKIN:   Rashes []  yes   [x]  no    Ulcers []  yes   []  no               [] tight to palpitation    skin turgor []  good  [] poor  [] decreased               Cyanosis/clubbing []  yes []  no    NEUR:   [x] cranial nerves II-XII grossly intact       [] Cranial nerves deficit                 []  facial droop    []  slurred speech   [] aphasic     [] Strength normal     []  weakness  []  LUE  []   RUE/ []  LLE  []   RLE    follows commands  [x]  yes []  no           PSYCH:   insight [] poor [] good   Alert and Oriented to  [x] person  [x] place  [x]  time                    [] depressed [] anxious [] agitated  [] lethargic [] stuporous  [] sedated     LAB DATA REVIEWED:    Recent Labs     10/31/23  1754 11/02/23  0350   WBC 9.3 6.9   HGB 12.7 11.1*   HCT 38.0 32.9*   * 133*     Recent Labs     10/31/23  1754 11/01/23  0549 11/02/23  0350    139 137   K 5.2* 5.4* 4.8    111* 110*   CO2 27 21 21   BUN 36* 39* 41*     Recent Labs     10/31/23  1754 11/01/23  0549   ALT 19 22   GLOB 4.4* 4.0     No results for input(s): \"INR\", \"APTT\" in the last 72 hours. Invalid input(s): \"PTP\"   No results for input(s): \"TIBC\", \"FERR\" in the last 72 hours. Invalid input(s): \"FE\", \"PSAT\"   No results for input(s): \"PH\", \"PCO2\", \"PO2\" in the last 72 hours. No results for input(s): \"CPK\", \"CKMB\", \"TROPONINI\" in the last 72 hours.   No results found for: \"GLUCPOC\"    Procedures: see electronic
no

## 2023-11-03 ENCOUNTER — APPOINTMENT (OUTPATIENT)
Facility: HOSPITAL | Age: 83
DRG: 194 | End: 2023-11-03
Payer: MEDICARE

## 2023-11-03 LAB
ANION GAP SERPL CALC-SCNC: 7 MMOL/L (ref 5–15)
APPEARANCE UR: CLEAR
BACTERIA URNS QL MICRO: NEGATIVE /HPF
BILIRUB UR QL: NEGATIVE
BUN SERPL-MCNC: 44 MG/DL (ref 6–20)
BUN/CREAT SERPL: 18 (ref 12–20)
CALCIUM SERPL-MCNC: 8.6 MG/DL (ref 8.5–10.1)
CHLORIDE SERPL-SCNC: 106 MMOL/L (ref 97–108)
CO2 SERPL-SCNC: 21 MMOL/L (ref 21–32)
COLOR UR: ABNORMAL
CREAT SERPL-MCNC: 2.44 MG/DL (ref 0.7–1.3)
CREAT UR-MCNC: 38.8 MG/DL
EKG ATRIAL RATE: 96 BPM
EKG DIAGNOSIS: NORMAL
EKG P-R INTERVAL: 138 MS
EKG Q-T INTERVAL: 346 MS
EKG QRS DURATION: 102 MS
EKG QTC CALCULATION (BAZETT): 437 MS
EKG R AXIS: -43 DEGREES
EKG T AXIS: 60 DEGREES
EKG VENTRICULAR RATE: 96 BPM
EPITH CASTS URNS QL MICRO: ABNORMAL /LPF
GLUCOSE BLD STRIP.AUTO-MCNC: 166 MG/DL (ref 65–117)
GLUCOSE BLD STRIP.AUTO-MCNC: 218 MG/DL (ref 65–117)
GLUCOSE BLD STRIP.AUTO-MCNC: 222 MG/DL (ref 65–117)
GLUCOSE BLD STRIP.AUTO-MCNC: 313 MG/DL (ref 65–117)
GLUCOSE SERPL-MCNC: 178 MG/DL (ref 65–100)
GLUCOSE UR STRIP.AUTO-MCNC: >1000 MG/DL
HGB UR QL STRIP: NEGATIVE
HYALINE CASTS URNS QL MICRO: ABNORMAL /LPF (ref 0–2)
KETONES UR QL STRIP.AUTO: NEGATIVE MG/DL
LEUKOCYTE ESTERASE UR QL STRIP.AUTO: NEGATIVE
MAGNESIUM SERPL-MCNC: 2.4 MG/DL (ref 1.6–2.4)
NITRITE UR QL STRIP.AUTO: NEGATIVE
PH UR STRIP: 5.5 (ref 5–8)
POTASSIUM SERPL-SCNC: 4.3 MMOL/L (ref 3.5–5.1)
PROT UR STRIP-MCNC: ABNORMAL MG/DL
RBC #/AREA URNS HPF: ABNORMAL /HPF (ref 0–5)
SERVICE CMNT-IMP: ABNORMAL
SODIUM SERPL-SCNC: 134 MMOL/L (ref 136–145)
SODIUM UR-SCNC: 39 MMOL/L
SP GR UR REFRACTOMETRY: 1.01
UROBILINOGEN UR QL STRIP.AUTO: 1 EU/DL (ref 0.2–1)
WBC URNS QL MICRO: ABNORMAL /HPF (ref 0–4)

## 2023-11-03 PROCEDURE — 76770 US EXAM ABDO BACK WALL COMP: CPT

## 2023-11-03 PROCEDURE — 36415 COLL VENOUS BLD VENIPUNCTURE: CPT

## 2023-11-03 PROCEDURE — 6360000002 HC RX W HCPCS: Performed by: INTERNAL MEDICINE

## 2023-11-03 PROCEDURE — 82570 ASSAY OF URINE CREATININE: CPT

## 2023-11-03 PROCEDURE — 83735 ASSAY OF MAGNESIUM: CPT

## 2023-11-03 PROCEDURE — 6370000000 HC RX 637 (ALT 250 FOR IP): Performed by: INTERNAL MEDICINE

## 2023-11-03 PROCEDURE — 80048 BASIC METABOLIC PNL TOTAL CA: CPT

## 2023-11-03 PROCEDURE — 6370000000 HC RX 637 (ALT 250 FOR IP): Performed by: STUDENT IN AN ORGANIZED HEALTH CARE EDUCATION/TRAINING PROGRAM

## 2023-11-03 PROCEDURE — 2580000003 HC RX 258: Performed by: INTERNAL MEDICINE

## 2023-11-03 PROCEDURE — 84300 ASSAY OF URINE SODIUM: CPT

## 2023-11-03 PROCEDURE — 82962 GLUCOSE BLOOD TEST: CPT

## 2023-11-03 PROCEDURE — 1100000003 HC PRIVATE W/ TELEMETRY

## 2023-11-03 PROCEDURE — 81001 URINALYSIS AUTO W/SCOPE: CPT

## 2023-11-03 RX ORDER — COLCHICINE 0.6 MG/1
1.2 TABLET ORAL ONCE
Status: COMPLETED | OUTPATIENT
Start: 2023-11-03 | End: 2023-11-03

## 2023-11-03 RX ORDER — ROSUVASTATIN CALCIUM 10 MG/1
10 TABLET, COATED ORAL NIGHTLY
Status: DISCONTINUED | OUTPATIENT
Start: 2023-11-03 | End: 2023-11-06 | Stop reason: HOSPADM

## 2023-11-03 RX ADMIN — ISOSORBIDE MONONITRATE 15 MG: 30 TABLET, EXTENDED RELEASE ORAL at 09:21

## 2023-11-03 RX ADMIN — SODIUM CHLORIDE, PRESERVATIVE FREE 10 ML: 5 INJECTION INTRAVENOUS at 09:29

## 2023-11-03 RX ADMIN — ENOXAPARIN SODIUM 30 MG: 100 INJECTION SUBCUTANEOUS at 09:21

## 2023-11-03 RX ADMIN — METOPROLOL SUCCINATE 25 MG: 25 TABLET, EXTENDED RELEASE ORAL at 09:22

## 2023-11-03 RX ADMIN — TAMSULOSIN HYDROCHLORIDE 0.4 MG: 0.4 CAPSULE ORAL at 09:21

## 2023-11-03 RX ADMIN — RANOLAZINE 500 MG: 500 TABLET, EXTENDED RELEASE ORAL at 20:38

## 2023-11-03 RX ADMIN — INSULIN LISPRO 2 UNITS: 100 INJECTION, SOLUTION INTRAVENOUS; SUBCUTANEOUS at 17:51

## 2023-11-03 RX ADMIN — COLCHICINE 1.2 MG: 0.6 TABLET, FILM COATED ORAL at 10:37

## 2023-11-03 RX ADMIN — PANTOPRAZOLE SODIUM 40 MG: 40 TABLET, DELAYED RELEASE ORAL at 06:06

## 2023-11-03 RX ADMIN — INSULIN LISPRO 6 UNITS: 100 INJECTION, SOLUTION INTRAVENOUS; SUBCUTANEOUS at 12:15

## 2023-11-03 RX ADMIN — RANOLAZINE 500 MG: 500 TABLET, EXTENDED RELEASE ORAL at 09:21

## 2023-11-03 RX ADMIN — GABAPENTIN 600 MG: 300 CAPSULE ORAL at 20:38

## 2023-11-03 RX ADMIN — ASPIRIN 81 MG: 81 TABLET, COATED ORAL at 09:22

## 2023-11-03 RX ADMIN — FINASTERIDE 5 MG: 5 TABLET, FILM COATED ORAL at 09:22

## 2023-11-03 RX ADMIN — CLOPIDOGREL BISULFATE 75 MG: 75 TABLET ORAL at 09:21

## 2023-11-03 RX ADMIN — SODIUM CHLORIDE, PRESERVATIVE FREE 10 ML: 5 INJECTION INTRAVENOUS at 20:38

## 2023-11-03 RX ADMIN — ROSUVASTATIN CALCIUM 10 MG: 10 TABLET, COATED ORAL at 20:38

## 2023-11-03 RX ADMIN — GABAPENTIN 600 MG: 300 CAPSULE ORAL at 09:22

## 2023-11-03 ASSESSMENT — PAIN SCALES - GENERAL
PAINLEVEL_OUTOF10: 0
PAINLEVEL_OUTOF10: 0

## 2023-11-03 NOTE — PLAN OF CARE
Problem: Discharge Planning  Goal: Discharge to home or other facility with appropriate resources  Outcome: Progressing     Problem: Pain  Goal: Verbalizes/displays adequate comfort level or baseline comfort level  Outcome: Progressing     Problem: ABCDS Injury Assessment  Goal: Absence of physical injury  Outcome: Progressing     Problem: Safety - Adult  Goal: Free from fall injury  Outcome: Progressing     Problem: Respiratory - Adult  Goal: Achieves optimal ventilation and oxygenation  Outcome: Progressing     Problem: Skin/Tissue Integrity - Adult  Goal: Skin integrity remains intact  Outcome: Progressing     Problem: Infection - Adult  Goal: Absence of infection at discharge  Outcome: Progressing

## 2023-11-03 NOTE — PLAN OF CARE
Problem: Discharge Planning  Goal: Discharge to home or other facility with appropriate resources  Recent Flowsheet Documentation  Taken 11/2/2023 0800 by Melida Schroeder RN  Discharge to home or other facility with appropriate resources: Identify barriers to discharge with patient and caregiver     Problem: Pain  Goal: Verbalizes/displays adequate comfort level or baseline comfort level  Recent Flowsheet Documentation  Taken 11/2/2023 1445 by Melida Schroeder RN  Verbalizes/displays adequate comfort level or baseline comfort level: Encourage patient to monitor pain and request assistance  Taken 11/2/2023 1110 by Melida Schroeder RN  Verbalizes/displays adequate comfort level or baseline comfort level: Encourage patient to monitor pain and request assistance  Taken 11/2/2023 0800 by Melida Schroeder RN  Verbalizes/displays adequate comfort level or baseline comfort level: Encourage patient to monitor pain and request assistance     Problem: ABCDS Injury Assessment  Goal: Absence of physical injury  Recent Flowsheet Documentation  Taken 11/2/2023 0800 by Melida Schroeder RN  Absence of Physical Injury: Implement safety measures based on patient assessment     Problem: Safety - Adult  Goal: Free from fall injury  Recent Flowsheet Documentation  Taken 11/2/2023 0800 by Melida Schroeder RN  Free From Fall Injury: Instruct family/caregiver on patient safety     Problem: Chronic Conditions and Co-morbidities  Goal: Patient's chronic conditions and co-morbidity symptoms are monitored and maintained or improved  Recent Flowsheet Documentation  Taken 11/2/2023 0800 by Melida Schreoder [facility] - Patient's Chronic Conditions and Co-Morbidity Symptoms are Monitored and Maintained or Improved: Collaborate with multidisciplinary team to address chronic and comorbid conditions and prevent exacerbation or deterioration     Problem: Respiratory - Adult  Goal: Achieves optimal ventilation and

## 2023-11-04 LAB
ANION GAP SERPL CALC-SCNC: 6 MMOL/L (ref 5–15)
BUN SERPL-MCNC: 44 MG/DL (ref 6–20)
BUN/CREAT SERPL: 20 (ref 12–20)
C3 SERPL-MCNC: 127 MG/DL (ref 82–167)
C4 SERPL-MCNC: 33 MG/DL (ref 12–38)
CALCIUM SERPL-MCNC: 9 MG/DL (ref 8.5–10.1)
CHLORIDE SERPL-SCNC: 106 MMOL/L (ref 97–108)
CO2 SERPL-SCNC: 23 MMOL/L (ref 21–32)
CREAT SERPL-MCNC: 2.16 MG/DL (ref 0.7–1.3)
GLUCOSE BLD STRIP.AUTO-MCNC: 162 MG/DL (ref 65–117)
GLUCOSE BLD STRIP.AUTO-MCNC: 180 MG/DL (ref 65–117)
GLUCOSE BLD STRIP.AUTO-MCNC: 189 MG/DL (ref 65–117)
GLUCOSE BLD STRIP.AUTO-MCNC: 200 MG/DL (ref 65–117)
GLUCOSE SERPL-MCNC: 157 MG/DL (ref 65–100)
MAGNESIUM SERPL-MCNC: 2.5 MG/DL (ref 1.6–2.4)
POTASSIUM SERPL-SCNC: 4.8 MMOL/L (ref 3.5–5.1)
SERVICE CMNT-IMP: ABNORMAL
SODIUM SERPL-SCNC: 135 MMOL/L (ref 136–145)

## 2023-11-04 PROCEDURE — 6370000000 HC RX 637 (ALT 250 FOR IP): Performed by: INTERNAL MEDICINE

## 2023-11-04 PROCEDURE — 82962 GLUCOSE BLOOD TEST: CPT

## 2023-11-04 PROCEDURE — 1100000003 HC PRIVATE W/ TELEMETRY

## 2023-11-04 PROCEDURE — 83735 ASSAY OF MAGNESIUM: CPT

## 2023-11-04 PROCEDURE — 6370000000 HC RX 637 (ALT 250 FOR IP): Performed by: STUDENT IN AN ORGANIZED HEALTH CARE EDUCATION/TRAINING PROGRAM

## 2023-11-04 PROCEDURE — 2580000003 HC RX 258: Performed by: INTERNAL MEDICINE

## 2023-11-04 PROCEDURE — 80048 BASIC METABOLIC PNL TOTAL CA: CPT

## 2023-11-04 PROCEDURE — 6360000002 HC RX W HCPCS: Performed by: INTERNAL MEDICINE

## 2023-11-04 RX ORDER — COLCHICINE 0.6 MG/1
0.6 TABLET ORAL ONCE
Status: COMPLETED | OUTPATIENT
Start: 2023-11-04 | End: 2023-11-04

## 2023-11-04 RX ORDER — COLCHICINE 0.6 MG/1
0.6 TABLET ORAL DAILY
Status: DISCONTINUED | OUTPATIENT
Start: 2023-11-04 | End: 2023-11-04

## 2023-11-04 RX ADMIN — CLOPIDOGREL BISULFATE 75 MG: 75 TABLET ORAL at 08:28

## 2023-11-04 RX ADMIN — TAMSULOSIN HYDROCHLORIDE 0.4 MG: 0.4 CAPSULE ORAL at 08:28

## 2023-11-04 RX ADMIN — COLCHICINE 0.6 MG: 0.6 TABLET, FILM COATED ORAL at 16:06

## 2023-11-04 RX ADMIN — ASPIRIN 81 MG: 81 TABLET, COATED ORAL at 08:27

## 2023-11-04 RX ADMIN — RANOLAZINE 500 MG: 500 TABLET, EXTENDED RELEASE ORAL at 08:27

## 2023-11-04 RX ADMIN — SODIUM CHLORIDE, PRESERVATIVE FREE 10 ML: 5 INJECTION INTRAVENOUS at 09:30

## 2023-11-04 RX ADMIN — FINASTERIDE 5 MG: 5 TABLET, FILM COATED ORAL at 08:28

## 2023-11-04 RX ADMIN — ROSUVASTATIN CALCIUM 10 MG: 10 TABLET, COATED ORAL at 20:36

## 2023-11-04 RX ADMIN — PANTOPRAZOLE SODIUM 40 MG: 40 TABLET, DELAYED RELEASE ORAL at 06:52

## 2023-11-04 RX ADMIN — GABAPENTIN 600 MG: 300 CAPSULE ORAL at 08:28

## 2023-11-04 RX ADMIN — METOPROLOL SUCCINATE 25 MG: 25 TABLET, EXTENDED RELEASE ORAL at 08:27

## 2023-11-04 RX ADMIN — ENOXAPARIN SODIUM 30 MG: 100 INJECTION SUBCUTANEOUS at 08:29

## 2023-11-04 RX ADMIN — SODIUM CHLORIDE, PRESERVATIVE FREE 10 ML: 5 INJECTION INTRAVENOUS at 20:36

## 2023-11-04 RX ADMIN — RANOLAZINE 500 MG: 500 TABLET, EXTENDED RELEASE ORAL at 20:36

## 2023-11-04 RX ADMIN — ISOSORBIDE MONONITRATE 15 MG: 30 TABLET, EXTENDED RELEASE ORAL at 08:28

## 2023-11-04 RX ADMIN — GABAPENTIN 600 MG: 300 CAPSULE ORAL at 20:36

## 2023-11-04 ASSESSMENT — PAIN SCALES - GENERAL
PAINLEVEL_OUTOF10: 0

## 2023-11-04 NOTE — PLAN OF CARE
Problem: Discharge Planning  Goal: Discharge to home or other facility with appropriate resources  Outcome: Progressing     Problem: Pain  Goal: Verbalizes/displays adequate comfort level or baseline comfort level  Outcome: Progressing     Problem: ABCDS Injury Assessment  Goal: Absence of physical injury  Outcome: Progressing     Problem: Safety - Adult  Goal: Free from fall injury  Outcome: Progressing     Problem: Skin/Tissue Integrity - Adult  Goal: Skin integrity remains intact  Outcome: Progressing     Problem: Metabolic/Fluid and Electrolytes - Adult  Goal: Electrolytes maintained within normal limits  Outcome: Progressing  Goal: Hemodynamic stability and optimal renal function maintained  Outcome: Progressing  Goal: Glucose maintained within prescribed range  Outcome: Progressing     Problem: Hematologic - Adult  Goal: Maintains hematologic stability  Outcome: Progressing

## 2023-11-04 NOTE — PLAN OF CARE
Problem: Discharge Planning  Goal: Discharge to home or other facility with appropriate resources  11/3/2023 2256 by Charlean Rubinstein, RN  Outcome: Progressing  11/3/2023 1956 by Cory Liang RN  Outcome: Progressing     Problem: Pain  Goal: Verbalizes/displays adequate comfort level or baseline comfort level  11/3/2023 1956 by oCry Liang RN  Outcome: Progressing     Problem: ABCDS Injury Assessment  Goal: Absence of physical injury  11/3/2023 1956 by Cory Liang RN  Outcome: Progressing     Problem: Safety - Adult  Goal: Free from fall injury  11/3/2023 1956 by Cory Liang RN  Outcome: Progressing     Problem: Respiratory - Adult  Goal: Achieves optimal ventilation and oxygenation  11/3/2023 1956 by Cory Liang RN  Outcome: Progressing     Problem: Skin/Tissue Integrity - Adult  Goal: Skin integrity remains intact  11/3/2023 1956 by Cory Liang RN  Outcome: Progressing     Problem: Infection - Adult  Goal: Absence of infection at discharge  11/3/2023 1956 by Cory Liang RN  Outcome: Progressing

## 2023-11-05 LAB
ALBUMIN SERPL-MCNC: 2.8 G/DL (ref 3.5–5)
ALBUMIN/GLOB SERPL: 0.7 (ref 1.1–2.2)
ALP SERPL-CCNC: 67 U/L (ref 45–117)
ALT SERPL-CCNC: 17 U/L (ref 12–78)
ANION GAP SERPL CALC-SCNC: 6 MMOL/L (ref 5–15)
AST SERPL-CCNC: 12 U/L (ref 15–37)
BILIRUB SERPL-MCNC: 0.6 MG/DL (ref 0.2–1)
BUN SERPL-MCNC: 51 MG/DL (ref 6–20)
BUN/CREAT SERPL: 22 (ref 12–20)
CALCIUM SERPL-MCNC: 8.7 MG/DL (ref 8.5–10.1)
CHLORIDE SERPL-SCNC: 106 MMOL/L (ref 97–108)
CO2 SERPL-SCNC: 22 MMOL/L (ref 21–32)
CREAT SERPL-MCNC: 2.35 MG/DL (ref 0.7–1.3)
ERYTHROCYTE [DISTWIDTH] IN BLOOD BY AUTOMATED COUNT: 12.5 % (ref 11.5–14.5)
GLOBULIN SER CALC-MCNC: 4.2 G/DL (ref 2–4)
GLUCOSE BLD STRIP.AUTO-MCNC: 181 MG/DL (ref 65–117)
GLUCOSE BLD STRIP.AUTO-MCNC: 216 MG/DL (ref 65–117)
GLUCOSE BLD STRIP.AUTO-MCNC: 296 MG/DL (ref 65–117)
GLUCOSE BLD STRIP.AUTO-MCNC: 330 MG/DL (ref 65–117)
GLUCOSE SERPL-MCNC: 184 MG/DL (ref 65–100)
HCT VFR BLD AUTO: 32.2 % (ref 36.6–50.3)
HGB BLD-MCNC: 11 G/DL (ref 12.1–17)
MAGNESIUM SERPL-MCNC: 2.4 MG/DL (ref 1.6–2.4)
MCH RBC QN AUTO: 30.6 PG (ref 26–34)
MCHC RBC AUTO-ENTMCNC: 34.2 G/DL (ref 30–36.5)
MCV RBC AUTO: 89.4 FL (ref 80–99)
NRBC # BLD: 0 K/UL (ref 0–0.01)
NRBC BLD-RTO: 0 PER 100 WBC
PHOSPHATE SERPL-MCNC: 3.5 MG/DL (ref 2.6–4.7)
PLATELET # BLD AUTO: 180 K/UL (ref 150–400)
PMV BLD AUTO: 9.6 FL (ref 8.9–12.9)
POTASSIUM SERPL-SCNC: 4.7 MMOL/L (ref 3.5–5.1)
PROT SERPL-MCNC: 7 G/DL (ref 6.4–8.2)
RBC # BLD AUTO: 3.6 M/UL (ref 4.1–5.7)
SERVICE CMNT-IMP: ABNORMAL
SODIUM SERPL-SCNC: 134 MMOL/L (ref 136–145)
WBC # BLD AUTO: 5.7 K/UL (ref 4.1–11.1)

## 2023-11-05 PROCEDURE — 84100 ASSAY OF PHOSPHORUS: CPT

## 2023-11-05 PROCEDURE — 6370000000 HC RX 637 (ALT 250 FOR IP): Performed by: INTERNAL MEDICINE

## 2023-11-05 PROCEDURE — 2580000003 HC RX 258: Performed by: INTERNAL MEDICINE

## 2023-11-05 PROCEDURE — 1100000003 HC PRIVATE W/ TELEMETRY

## 2023-11-05 PROCEDURE — 6360000002 HC RX W HCPCS: Performed by: INTERNAL MEDICINE

## 2023-11-05 PROCEDURE — 36415 COLL VENOUS BLD VENIPUNCTURE: CPT

## 2023-11-05 PROCEDURE — 82962 GLUCOSE BLOOD TEST: CPT

## 2023-11-05 PROCEDURE — 6370000000 HC RX 637 (ALT 250 FOR IP): Performed by: STUDENT IN AN ORGANIZED HEALTH CARE EDUCATION/TRAINING PROGRAM

## 2023-11-05 PROCEDURE — 83735 ASSAY OF MAGNESIUM: CPT

## 2023-11-05 PROCEDURE — 80053 COMPREHEN METABOLIC PANEL: CPT

## 2023-11-05 PROCEDURE — 85027 COMPLETE CBC AUTOMATED: CPT

## 2023-11-05 RX ORDER — COLCHICINE 0.6 MG/1
0.6 TABLET ORAL DAILY
Status: DISCONTINUED | OUTPATIENT
Start: 2023-11-05 | End: 2023-11-05

## 2023-11-05 RX ORDER — COLCHICINE 0.6 MG/1
0.6 TABLET ORAL ONCE
Status: DISCONTINUED | OUTPATIENT
Start: 2023-11-05 | End: 2023-11-05

## 2023-11-05 RX ORDER — COLCHICINE 0.6 MG/1
0.6 TABLET ORAL DAILY
Status: DISCONTINUED | OUTPATIENT
Start: 2023-11-05 | End: 2023-11-06 | Stop reason: HOSPADM

## 2023-11-05 RX ADMIN — RANOLAZINE 500 MG: 500 TABLET, EXTENDED RELEASE ORAL at 08:00

## 2023-11-05 RX ADMIN — INSULIN LISPRO 4 UNITS: 100 INJECTION, SOLUTION INTRAVENOUS; SUBCUTANEOUS at 12:07

## 2023-11-05 RX ADMIN — ASPIRIN 81 MG: 81 TABLET, COATED ORAL at 07:58

## 2023-11-05 RX ADMIN — ROSUVASTATIN CALCIUM 10 MG: 10 TABLET, COATED ORAL at 21:02

## 2023-11-05 RX ADMIN — COLCHICINE 0.6 MG: 0.6 TABLET, FILM COATED ORAL at 16:20

## 2023-11-05 RX ADMIN — PANTOPRAZOLE SODIUM 40 MG: 40 TABLET, DELAYED RELEASE ORAL at 07:01

## 2023-11-05 RX ADMIN — GABAPENTIN 600 MG: 300 CAPSULE ORAL at 20:53

## 2023-11-05 RX ADMIN — INSULIN LISPRO 2 UNITS: 100 INJECTION, SOLUTION INTRAVENOUS; SUBCUTANEOUS at 18:10

## 2023-11-05 RX ADMIN — ENOXAPARIN SODIUM 30 MG: 100 INJECTION SUBCUTANEOUS at 07:59

## 2023-11-05 RX ADMIN — GABAPENTIN 600 MG: 300 CAPSULE ORAL at 07:59

## 2023-11-05 RX ADMIN — INSULIN LISPRO 4 UNITS: 100 INJECTION, SOLUTION INTRAVENOUS; SUBCUTANEOUS at 22:17

## 2023-11-05 RX ADMIN — METOPROLOL SUCCINATE 25 MG: 25 TABLET, EXTENDED RELEASE ORAL at 08:00

## 2023-11-05 RX ADMIN — SODIUM CHLORIDE, PRESERVATIVE FREE 10 ML: 5 INJECTION INTRAVENOUS at 22:17

## 2023-11-05 RX ADMIN — RANOLAZINE 500 MG: 500 TABLET, EXTENDED RELEASE ORAL at 21:02

## 2023-11-05 RX ADMIN — ISOSORBIDE MONONITRATE 15 MG: 30 TABLET, EXTENDED RELEASE ORAL at 07:58

## 2023-11-05 RX ADMIN — CLOPIDOGREL BISULFATE 75 MG: 75 TABLET ORAL at 08:00

## 2023-11-05 RX ADMIN — SODIUM CHLORIDE, PRESERVATIVE FREE 10 ML: 5 INJECTION INTRAVENOUS at 08:06

## 2023-11-05 RX ADMIN — FINASTERIDE 5 MG: 5 TABLET, FILM COATED ORAL at 08:00

## 2023-11-05 RX ADMIN — TAMSULOSIN HYDROCHLORIDE 0.4 MG: 0.4 CAPSULE ORAL at 07:58

## 2023-11-05 ASSESSMENT — PAIN SCALES - GENERAL
PAINLEVEL_OUTOF10: 0

## 2023-11-05 NOTE — PLAN OF CARE
Problem: Discharge Planning  Goal: Discharge to home or other facility with appropriate resources  11/4/2023 2017 by Akiko Guzman RN  Outcome: Progressing  11/4/2023 1817 by Disha Slade RN  Outcome: Progressing     Problem: Pain  Goal: Verbalizes/displays adequate comfort level or baseline comfort level  11/4/2023 2017 by Akiko Guzman RN  Outcome: Progressing  11/4/2023 1817 by Disha Slade RN  Outcome: Progressing     Problem: ABCDS Injury Assessment  Goal: Absence of physical injury  11/4/2023 1817 by Disha Slade RN  Outcome: Progressing     Problem: Safety - Adult  Goal: Free from fall injury  11/4/2023 1817 by Disha Slade RN  Outcome: Progressing     Problem: Skin/Tissue Integrity - Adult  Goal: Skin integrity remains intact  11/4/2023 1817 by Disha Slade RN  Outcome: Progressing     Problem: Metabolic/Fluid and Electrolytes - Adult  Goal: Electrolytes maintained within normal limits  11/4/2023 1817 by Disha Slade RN  Outcome: Progressing  Goal: Hemodynamic stability and optimal renal function maintained  11/4/2023 1817 by Disha Slade RN  Outcome: Progressing  Goal: Glucose maintained within prescribed range  11/4/2023 1817 by Disha Slade RN  Outcome: Progressing     Problem: Hematologic - Adult  Goal: Maintains hematologic stability  11/4/2023 1817 by Disha Slade RN  Outcome: Progressing

## 2023-11-05 NOTE — PLAN OF CARE
Problem: Discharge Planning  Goal: Discharge to home or other facility with appropriate resources  Outcome: Progressing     Problem: Pain  Goal: Verbalizes/displays adequate comfort level or baseline comfort level  Outcome: Progressing     Problem: ABCDS Injury Assessment  Goal: Absence of physical injury  Outcome: Progressing     Problem: Chronic Conditions and Co-morbidities  Goal: Patient's chronic conditions and co-morbidity symptoms are monitored and maintained or improved  Outcome: Progressing     Problem: Respiratory - Adult  Goal: Achieves optimal ventilation and oxygenation  Outcome: Progressing     Problem: Skin/Tissue Integrity - Adult  Goal: Skin integrity remains intact  Outcome: Progressing     Problem: Musculoskeletal - Adult  Goal: Return ADL status to a safe level of function  Outcome: Progressing     Problem: Infection - Adult  Goal: Absence of infection at discharge  Outcome: Progressing     Problem: Metabolic/Fluid and Electrolytes - Adult  Goal: Electrolytes maintained within normal limits  Outcome: Progressing  Goal: Hemodynamic stability and optimal renal function maintained  Outcome: Progressing  Goal: Glucose maintained within prescribed range  Outcome: Progressing

## 2023-11-06 VITALS
BODY MASS INDEX: 27.3 KG/M2 | SYSTOLIC BLOOD PRESSURE: 120 MMHG | RESPIRATION RATE: 20 BRPM | HEIGHT: 68 IN | HEART RATE: 86 BPM | WEIGHT: 180.12 LBS | TEMPERATURE: 98 F | OXYGEN SATURATION: 98 % | DIASTOLIC BLOOD PRESSURE: 62 MMHG

## 2023-11-06 LAB
ALBUMIN SERPL-MCNC: 3 G/DL (ref 3.5–5)
ALBUMIN/GLOB SERPL: 0.6 (ref 1.1–2.2)
ALP SERPL-CCNC: 70 U/L (ref 45–117)
ALT SERPL-CCNC: 18 U/L (ref 12–78)
ANION GAP SERPL CALC-SCNC: 5 MMOL/L (ref 5–15)
AST SERPL-CCNC: 11 U/L (ref 15–37)
BILIRUB SERPL-MCNC: 0.7 MG/DL (ref 0.2–1)
BUN SERPL-MCNC: 45 MG/DL (ref 6–20)
BUN/CREAT SERPL: 23 (ref 12–20)
CALCIUM SERPL-MCNC: 9 MG/DL (ref 8.5–10.1)
CHLORIDE SERPL-SCNC: 104 MMOL/L (ref 97–108)
CO2 SERPL-SCNC: 23 MMOL/L (ref 21–32)
CREAT SERPL-MCNC: 1.98 MG/DL (ref 0.7–1.3)
ERYTHROCYTE [DISTWIDTH] IN BLOOD BY AUTOMATED COUNT: 12.4 % (ref 11.5–14.5)
GLOBULIN SER CALC-MCNC: 4.7 G/DL (ref 2–4)
GLUCOSE BLD STRIP.AUTO-MCNC: 232 MG/DL (ref 65–117)
GLUCOSE BLD STRIP.AUTO-MCNC: 315 MG/DL (ref 65–117)
GLUCOSE SERPL-MCNC: 234 MG/DL (ref 65–100)
HCT VFR BLD AUTO: 34.6 % (ref 36.6–50.3)
HGB BLD-MCNC: 12 G/DL (ref 12.1–17)
MAGNESIUM SERPL-MCNC: 2.4 MG/DL (ref 1.6–2.4)
MCH RBC QN AUTO: 31.3 PG (ref 26–34)
MCHC RBC AUTO-ENTMCNC: 34.7 G/DL (ref 30–36.5)
MCV RBC AUTO: 90.1 FL (ref 80–99)
NRBC # BLD: 0 K/UL (ref 0–0.01)
NRBC BLD-RTO: 0 PER 100 WBC
PHOSPHATE SERPL-MCNC: 3.5 MG/DL (ref 2.6–4.7)
PLATELET # BLD AUTO: 201 K/UL (ref 150–400)
PMV BLD AUTO: 9.5 FL (ref 8.9–12.9)
POTASSIUM SERPL-SCNC: 4.7 MMOL/L (ref 3.5–5.1)
PROT SERPL-MCNC: 7.7 G/DL (ref 6.4–8.2)
RBC # BLD AUTO: 3.84 M/UL (ref 4.1–5.7)
SERVICE CMNT-IMP: ABNORMAL
SERVICE CMNT-IMP: ABNORMAL
SODIUM SERPL-SCNC: 132 MMOL/L (ref 136–145)
WBC # BLD AUTO: 5 K/UL (ref 4.1–11.1)

## 2023-11-06 PROCEDURE — 2580000003 HC RX 258: Performed by: INTERNAL MEDICINE

## 2023-11-06 PROCEDURE — 80053 COMPREHEN METABOLIC PANEL: CPT

## 2023-11-06 PROCEDURE — 6370000000 HC RX 637 (ALT 250 FOR IP): Performed by: INTERNAL MEDICINE

## 2023-11-06 PROCEDURE — 85027 COMPLETE CBC AUTOMATED: CPT

## 2023-11-06 PROCEDURE — 83735 ASSAY OF MAGNESIUM: CPT

## 2023-11-06 PROCEDURE — 6360000002 HC RX W HCPCS: Performed by: INTERNAL MEDICINE

## 2023-11-06 PROCEDURE — 82962 GLUCOSE BLOOD TEST: CPT

## 2023-11-06 PROCEDURE — 36415 COLL VENOUS BLD VENIPUNCTURE: CPT

## 2023-11-06 PROCEDURE — 6370000000 HC RX 637 (ALT 250 FOR IP): Performed by: STUDENT IN AN ORGANIZED HEALTH CARE EDUCATION/TRAINING PROGRAM

## 2023-11-06 PROCEDURE — 84100 ASSAY OF PHOSPHORUS: CPT

## 2023-11-06 RX ORDER — RANOLAZINE 500 MG/1
500 TABLET, EXTENDED RELEASE ORAL 2 TIMES DAILY
Qty: 60 TABLET | Refills: 3 | Status: SHIPPED | OUTPATIENT
Start: 2023-11-06

## 2023-11-06 RX ORDER — COLCHICINE 0.6 MG/1
0.6 TABLET ORAL DAILY
Qty: 3 TABLET | Refills: 0 | Status: SHIPPED | OUTPATIENT
Start: 2023-11-07 | End: 2023-11-10

## 2023-11-06 RX ADMIN — FINASTERIDE 5 MG: 5 TABLET, FILM COATED ORAL at 08:21

## 2023-11-06 RX ADMIN — TAMSULOSIN HYDROCHLORIDE 0.4 MG: 0.4 CAPSULE ORAL at 08:21

## 2023-11-06 RX ADMIN — RANOLAZINE 500 MG: 500 TABLET, EXTENDED RELEASE ORAL at 08:21

## 2023-11-06 RX ADMIN — CLOPIDOGREL BISULFATE 75 MG: 75 TABLET ORAL at 08:21

## 2023-11-06 RX ADMIN — ISOSORBIDE MONONITRATE 15 MG: 30 TABLET, EXTENDED RELEASE ORAL at 08:21

## 2023-11-06 RX ADMIN — ASPIRIN 81 MG: 81 TABLET, COATED ORAL at 08:21

## 2023-11-06 RX ADMIN — INSULIN LISPRO 6 UNITS: 100 INJECTION, SOLUTION INTRAVENOUS; SUBCUTANEOUS at 12:42

## 2023-11-06 RX ADMIN — COLCHICINE 0.6 MG: 0.6 TABLET, FILM COATED ORAL at 08:21

## 2023-11-06 RX ADMIN — PANTOPRAZOLE SODIUM 40 MG: 40 TABLET, DELAYED RELEASE ORAL at 06:27

## 2023-11-06 RX ADMIN — ENOXAPARIN SODIUM 30 MG: 100 INJECTION SUBCUTANEOUS at 08:21

## 2023-11-06 RX ADMIN — METOPROLOL SUCCINATE 25 MG: 25 TABLET, EXTENDED RELEASE ORAL at 08:20

## 2023-11-06 RX ADMIN — GABAPENTIN 600 MG: 300 CAPSULE ORAL at 08:21

## 2023-11-06 RX ADMIN — INSULIN LISPRO 2 UNITS: 100 INJECTION, SOLUTION INTRAVENOUS; SUBCUTANEOUS at 08:52

## 2023-11-06 RX ADMIN — SODIUM CHLORIDE, PRESERVATIVE FREE 10 ML: 5 INJECTION INTRAVENOUS at 08:22

## 2023-11-06 ASSESSMENT — PAIN SCALES - GENERAL: PAINLEVEL_OUTOF10: 0

## 2023-11-06 NOTE — CARE COORDINATION
11/02/23 1112   Readmission Assessment   Number of Days since last admission? 8-30 days   Previous Disposition Home with Family   Who is being Interviewed Patient   What was the patient's/caregiver's perception as to why they think they needed to return back to the hospital? Other (Comment)  (was readmitted due to complaints of chest pain and sob)   Did you visit your Primary Care Physician after you left the hospital, before you returned this time? Yes   Did you see a specialist, such as Cardiac, Pulmonary, Orthopedic Physician, etc. after you left the hospital? No  (appointment was not before this week and he did not think he needed to see specialist sooner)   Who advised the patient to return to the hospital? Physician's Nurse/Office staff   Does the patient report anything that got in the way of taking their medications? No   In our efforts to provide the best possible care to you and others like you, can you think of anything that we could have done to help you after you left the hospital the first time, so that you might not have needed to return so soon?  Other (Comment)  (Sooner follow up with speciaist)     Mary Murphy RN CM    [id number]
11/06/23 1217   Services At/After Discharge   Transition of Care Consult (CM Consult) N/A   Services At/After Discharge None   Mode of Transport at Discharge Other (see comment)   Confirm Follow Up Transport Family   Condition of Participation: Discharge Planning   The Plan for Transition of Care is related to the following treatment goals: Follow  up with cardiologist and PCP     CM spoke with patient. His wife will transport home. Follow up has been scheduled.     Zaira Meza  [facility]
discharged.     Iggy Drake  [facility]

## 2023-11-06 NOTE — DISCHARGE INSTRUCTIONS
HOSPITALIST DISCHARGE INSTRUCTIONS    NAME: Marietta Mahmood Sr.   :  1940   MRN:  [id number]     Date/Time:  2023 11:11 AM    ADMIT DATE: 10/31/2023   DISCHARGE DATE: 2023         It is important that you take the medication exactly as they are prescribed. Keep your medication in the bottles provided by the pharmacist and keep a list of the medication names, dosages, and times to be taken in your wallet. Do not take other medications without consulting your doctor. What to do at Home    Recommended diet:  cardiac diet    Recommended activity: activity as tolerated      If you have questions regarding the hospital related prescriptions or hospital related issues please call [address] [place] [address] office at [phone]. You can always direct your questions to your primary care doctor if you are unable to reach your hospital physician; your PCP works as an extension of your hospital doctor just like your hospital doctor is an extension of your PCP for your time at the hospital [facility], [facility])    If you experience any of the following symptoms then please call your primary care physician or return to the emergency room if you cannot get hold of your doctor:    Fever, chills, nausea, vomiting, or persistent diarrhea  Worsening weakness or new problems with your speech or balance  Dark stools or visible blood in your stools  New Leg swelling or shortness of breath as these could be signs of a clot    Additional Instructions:      Bring these papers with you to your follow up appointments.  The papers will help your doctors be sure to continue the care plan from the hospital.

## 2023-11-06 NOTE — DISCHARGE SUMMARY
DISCHARGE SUMMARY              Name: Jose Francisco Jin  [id number]  YOB: 1940 (Age: 80 y.o.)   Date of Admission: 10/31/2023  Date of Discharge: 11/6/2023  Attending Physician: Felicia Licona MD    DISCHARGE DIAGNOSIS:    Chest pain, due to pleurisy   CAD, s/p KARLENE to LAD and LCx  Hypertension  Persistent left lung base density, likely atelectasis  DM 2 with neuropathy  CATHERINE, resolved, on CKD 3  BPH      CONSULTATIONS:  IP CONSULT TO CARDIOLOGY  IP CONSULT TO NEPHROLOGY    Excerpted HPI from H&P of Felicia Licona MD:  CHIEF COMPLAINT: Chest pain     HISTORY OF PRESENT ILLNESS:     Dario Oakes is a 80 y.o. male with a history of CAD, recent LAD and left circumflex stents, diabetes, CKD and BPH who presents with chest discomfort. Patient has been compliant with his cardiac medications. Around 11:00 this morning he was working on his computer when he suddenly experienced midsternal chest discomfort. He describes this as sharp, radiates to his back and worse with inspiration. This pain persisted throughout the afternoon. He he ate a hotdog around 3 PM which did not affect the intensity or character of his pain. In the ED he was given a dose of nitroglycerin sublingual 0.4 mg prior to my evaluation and he reports some improvement. Serial troponins x2 were within normal limits. Patient was admitted 3 weeks ago for for similar presentation. He underwent left heart catheterization which demonstrated severe three-vessel disease and status post KARLENE stents to his LAD and left circumflex artery.       Brief Hospital Course by Main Problems:     Chest pain, pleuritic   CAD, s/p KARLENE to LAD and LCx  Hypertension  -hx of PCI of Lcx in 2001, cath in 2009 negative,   -s/p C 10/04/23 severe three-vessel disease with PCI of LAD with Roto then KARLENE, PCI of Lcx with KARLENE, diseased rPL x 2 will be treated medically initially due to small size and tortous RCA.  -EKG today compared with

## 2023-11-07 ENCOUNTER — HOSPITAL ENCOUNTER (OUTPATIENT)
Facility: HOSPITAL | Age: 83
Setting detail: RECURRING SERIES
Discharge: HOME | End: 2023-11-10
Payer: MEDICARE

## 2023-11-07 ENCOUNTER — HOSPITAL ENCOUNTER (OUTPATIENT)
Facility: HOSPITAL | Age: 83
Setting detail: RECURRING SERIES
End: 2023-11-07
Payer: MEDICARE

## 2023-11-07 VITALS — OXYGEN SATURATION: 97 % | BODY MASS INDEX: 26.8 KG/M2 | WEIGHT: 176.2 LBS

## 2023-11-07 PROCEDURE — 93798 PHYS/QHP OP CAR RHAB W/ECG: CPT

## 2023-11-07 ASSESSMENT — EXERCISE STRESS TEST
PEAK_BP: 137/52
PEAK_METS: 2.2
PEAK_HR: 110
PEAK_RPE: 11
PEAK_BP: 137/52
PEAK_HR: 110
PEAK_RPE: 11
PEAK_BP: 137/52
PEAK_RPD: 5

## 2023-11-07 ASSESSMENT — LIFESTYLE VARIABLES
ALCOHOL_AMOUNT: 3
ALCOHOL_USE: WEEKLY
ALCOHOL_TYPE: BEER

## 2023-11-07 ASSESSMENT — PATIENT HEALTH QUESTIONNAIRE - PHQ9
9. THOUGHTS THAT YOU WOULD BE BETTER OFF DEAD, OR OF HURTING YOURSELF: 0
SUM OF ALL RESPONSES TO PHQ QUESTIONS 1-9: 4
2. FEELING DOWN, DEPRESSED OR HOPELESS: 1
8. MOVING OR SPEAKING SO SLOWLY THAT OTHER PEOPLE COULD HAVE NOTICED. OR THE OPPOSITE, BEING SO FIGETY OR RESTLESS THAT YOU HAVE BEEN MOVING AROUND A LOT MORE THAN USUAL: 0
7. TROUBLE CONCENTRATING ON THINGS, SUCH AS READING THE NEWSPAPER OR WATCHING TELEVISION: 0
6. FEELING BAD ABOUT YOURSELF - OR THAT YOU ARE A FAILURE OR HAVE LET YOURSELF OR YOUR FAMILY DOWN: 2
SUM OF ALL RESPONSES TO PHQ QUESTIONS 1-9: 4
3. TROUBLE FALLING OR STAYING ASLEEP: 0
SUM OF ALL RESPONSES TO PHQ9 QUESTIONS 1 & 2: 1
1. LITTLE INTEREST OR PLEASURE IN DOING THINGS: 0
10. IF YOU CHECKED OFF ANY PROBLEMS, HOW DIFFICULT HAVE THESE PROBLEMS MADE IT FOR YOU TO DO YOUR WORK, TAKE CARE OF THINGS AT HOME, OR GET ALONG WITH OTHER PEOPLE: 0
SUM OF ALL RESPONSES TO PHQ QUESTIONS 1-9: 4
4. FEELING TIRED OR HAVING LITTLE ENERGY: 1
SUM OF ALL RESPONSES TO PHQ QUESTIONS 1-9: 4
5. POOR APPETITE OR OVEREATING: 0

## 2023-11-07 ASSESSMENT — EJECTION FRACTION: EF_VALUE: 50

## 2023-11-07 NOTE — CARDIO/PULMONARY
baseline and able to do what he wants and reduce his stress level.      Intake Start Time: 0009  Intake End 1250 [address] [place] [address] Alicja Gutiérrez RN

## 2023-11-10 ENCOUNTER — HOSPITAL ENCOUNTER (OUTPATIENT)
Facility: HOSPITAL | Age: 83
Setting detail: RECURRING SERIES
Discharge: HOME | End: 2023-11-13
Payer: MEDICARE

## 2023-11-10 VITALS — WEIGHT: 167 LBS | BODY MASS INDEX: 25.4 KG/M2

## 2023-11-10 LAB
GLUCOSE BLD STRIP.AUTO-MCNC: 149 MG/DL (ref 65–117)
SERVICE CMNT-IMP: ABNORMAL

## 2023-11-10 PROCEDURE — 82962 GLUCOSE BLOOD TEST: CPT

## 2023-11-10 PROCEDURE — 93798 PHYS/QHP OP CAR RHAB W/ECG: CPT

## 2023-11-10 ASSESSMENT — EXERCISE STRESS TEST
PEAK_RPE: 14
PEAK_METS: 2.2
PEAK_HR: 124

## 2023-11-13 ENCOUNTER — HOSPITAL ENCOUNTER (OUTPATIENT)
Facility: HOSPITAL | Age: 83
Setting detail: RECURRING SERIES
Discharge: HOME | End: 2023-11-16
Payer: MEDICARE

## 2023-11-13 VITALS — BODY MASS INDEX: 25.7 KG/M2 | WEIGHT: 169 LBS

## 2023-11-13 PROCEDURE — 93798 PHYS/QHP OP CAR RHAB W/ECG: CPT

## 2023-11-13 ASSESSMENT — EXERCISE STRESS TEST
PEAK_METS: 2.2
PEAK_HR: 126
PEAK_RPE: 13

## 2023-11-17 ENCOUNTER — APPOINTMENT (OUTPATIENT)
Facility: HOSPITAL | Age: 83
End: 2023-11-17
Payer: MEDICARE

## 2023-11-17 ENCOUNTER — HOSPITAL ENCOUNTER (OUTPATIENT)
Facility: HOSPITAL | Age: 83
Setting detail: RECURRING SERIES
End: 2023-11-17
Payer: MEDICARE

## 2023-11-20 ENCOUNTER — HOSPITAL ENCOUNTER (OUTPATIENT)
Facility: HOSPITAL | Age: 83
Setting detail: RECURRING SERIES
Discharge: HOME | End: 2023-11-23
Payer: MEDICARE

## 2023-11-20 PROCEDURE — 93798 PHYS/QHP OP CAR RHAB W/ECG: CPT

## 2023-11-20 NOTE — CARDIO/PULMONARY
Pt reports to cardiac rehab with  at rest. Reports he did not take his medications this morning due to rushing. Asymptomatic. Session canceled.

## 2023-11-24 ENCOUNTER — APPOINTMENT (OUTPATIENT)
Facility: HOSPITAL | Age: 83
End: 2023-11-24
Payer: MEDICARE

## 2023-11-27 ENCOUNTER — HOSPITAL ENCOUNTER (OUTPATIENT)
Facility: HOSPITAL | Age: 83
Setting detail: RECURRING SERIES
Discharge: HOME | End: 2023-11-30
Payer: MEDICARE

## 2023-11-27 VITALS — BODY MASS INDEX: 25.09 KG/M2 | WEIGHT: 165 LBS

## 2023-11-27 PROCEDURE — 93798 PHYS/QHP OP CAR RHAB W/ECG: CPT

## 2023-11-27 ASSESSMENT — EXERCISE STRESS TEST
PEAK_RPE: 18
PEAK_HR: 116
PEAK_METS: 2.2

## 2023-12-01 ENCOUNTER — HOSPITAL ENCOUNTER (OUTPATIENT)
Facility: HOSPITAL | Age: 83
Setting detail: RECURRING SERIES
Discharge: HOME | End: 2023-12-04
Payer: MEDICARE

## 2023-12-01 VITALS — BODY MASS INDEX: 24.33 KG/M2 | WEIGHT: 160 LBS

## 2023-12-01 PROCEDURE — 93797 PHYS/QHP OP CAR RHAB WO ECG: CPT

## 2023-12-01 PROCEDURE — 93798 PHYS/QHP OP CAR RHAB W/ECG: CPT

## 2023-12-01 ASSESSMENT — EXERCISE STRESS TEST
PEAK_HR: 114
PEAK_RPE: 13
PEAK_METS: 2.2

## 2023-12-04 ENCOUNTER — HOSPITAL ENCOUNTER (OUTPATIENT)
Facility: HOSPITAL | Age: 83
Setting detail: RECURRING SERIES
Discharge: HOME | End: 2023-12-07
Payer: MEDICARE

## 2023-12-04 VITALS — WEIGHT: 164 LBS | BODY MASS INDEX: 24.94 KG/M2

## 2023-12-04 PROCEDURE — 93798 PHYS/QHP OP CAR RHAB W/ECG: CPT

## 2023-12-04 ASSESSMENT — EXERCISE STRESS TEST
PEAK_HR: 114
PEAK_RPD: 5
PEAK_RPE: 13
PEAK_METS: 2.2

## 2023-12-04 ASSESSMENT — LIFESTYLE VARIABLES
ALCOHOL_USE: WEEKLY
ALCOHOL_AMOUNT: 3
ALCOHOL_TYPE: BEER

## 2023-12-04 ASSESSMENT — EJECTION FRACTION: EF_VALUE: 50

## 2023-12-08 ENCOUNTER — APPOINTMENT (OUTPATIENT)
Facility: HOSPITAL | Age: 83
End: 2023-12-08
Payer: MEDICARE

## 2023-12-11 ENCOUNTER — HOSPITAL ENCOUNTER (OUTPATIENT)
Facility: HOSPITAL | Age: 83
Setting detail: RECURRING SERIES
Discharge: HOME | End: 2023-12-14
Payer: MEDICARE

## 2023-12-11 VITALS — WEIGHT: 169 LBS | BODY MASS INDEX: 25.7 KG/M2

## 2023-12-11 PROCEDURE — 93798 PHYS/QHP OP CAR RHAB W/ECG: CPT

## 2023-12-11 ASSESSMENT — EXERCISE STRESS TEST
PEAK_RPE: 13
PEAK_METS: 2.5
PEAK_HR: 123

## 2023-12-22 ENCOUNTER — HOSPITAL ENCOUNTER (OUTPATIENT)
Facility: HOSPITAL | Age: 83
Setting detail: RECURRING SERIES
Discharge: HOME | End: 2023-12-25
Payer: MEDICARE

## 2023-12-22 VITALS — WEIGHT: 169 LBS | BODY MASS INDEX: 25.7 KG/M2

## 2023-12-22 PROCEDURE — 93798 PHYS/QHP OP CAR RHAB W/ECG: CPT

## 2023-12-29 ENCOUNTER — APPOINTMENT (OUTPATIENT)
Facility: HOSPITAL | Age: 83
End: 2023-12-29
Payer: MEDICARE

## 2024-01-05 ENCOUNTER — HOSPITAL ENCOUNTER (OUTPATIENT)
Facility: HOSPITAL | Age: 84
Setting detail: RECURRING SERIES
Discharge: HOME | End: 2024-01-08
Payer: MEDICARE

## 2024-01-05 PROCEDURE — 93798 PHYS/QHP OP CAR RHAB W/ECG: CPT

## 2024-01-08 ENCOUNTER — HOSPITAL ENCOUNTER (OUTPATIENT)
Facility: HOSPITAL | Age: 84
Setting detail: RECURRING SERIES
Discharge: HOME | End: 2024-01-11
Payer: MEDICARE

## 2024-01-08 ENCOUNTER — APPOINTMENT (OUTPATIENT)
Facility: HOSPITAL | Age: 84
End: 2024-01-08
Payer: MEDICARE

## 2024-01-08 LAB
GLUCOSE BLD STRIP.AUTO-MCNC: 90 MG/DL (ref 65–117)
GLUCOSE BLD STRIP.AUTO-MCNC: 93 MG/DL (ref 65–117)
SERVICE CMNT-IMP: NORMAL
SERVICE CMNT-IMP: NORMAL

## 2024-01-08 PROCEDURE — 93798 PHYS/QHP OP CAR RHAB W/ECG: CPT

## 2024-01-08 NOTE — CARDIO/PULMONARY
Patient's glucose meter alerted a low blood sugar.  BS 91, 6 ounces of juice given.  Blood sugar up to 93, 6 more ounces of juice given.  Blood sugar down to 90.  Patient asymptomatic and would like to go home and eat a real meal.  He had nuts and berries for breakfast so education provided on complex carbs and protein.

## 2024-01-12 ENCOUNTER — APPOINTMENT (OUTPATIENT)
Facility: HOSPITAL | Age: 84
End: 2024-01-12
Payer: MEDICARE

## 2024-01-12 ENCOUNTER — HOSPITAL ENCOUNTER (OUTPATIENT)
Facility: HOSPITAL | Age: 84
Setting detail: RECURRING SERIES
Discharge: HOME | End: 2024-01-15
Payer: MEDICARE

## 2024-01-12 VITALS — WEIGHT: 169.4 LBS | BODY MASS INDEX: 25.76 KG/M2

## 2024-01-12 PROCEDURE — 93798 PHYS/QHP OP CAR RHAB W/ECG: CPT

## 2024-01-12 ASSESSMENT — EXERCISE STRESS TEST
PEAK_RPE: 11
PEAK_HR: 120
PEAK_METS: 2.5

## 2024-01-15 ENCOUNTER — APPOINTMENT (OUTPATIENT)
Facility: HOSPITAL | Age: 84
End: 2024-01-15
Payer: MEDICARE

## 2024-01-19 ENCOUNTER — APPOINTMENT (OUTPATIENT)
Facility: HOSPITAL | Age: 84
End: 2024-01-19
Payer: MEDICARE

## 2024-01-19 ENCOUNTER — HOSPITAL ENCOUNTER (OUTPATIENT)
Facility: HOSPITAL | Age: 84
Setting detail: RECURRING SERIES
Discharge: HOME | End: 2024-01-22
Payer: MEDICARE

## 2024-01-19 VITALS — BODY MASS INDEX: 26.31 KG/M2 | WEIGHT: 173 LBS

## 2024-01-19 PROCEDURE — 93798 PHYS/QHP OP CAR RHAB W/ECG: CPT

## 2024-01-19 ASSESSMENT — EJECTION FRACTION: EF_VALUE: 50

## 2024-01-19 ASSESSMENT — EXERCISE STRESS TEST
PEAK_RPE: 11
PEAK_METS: 2.5
PEAK_RPD: 5
PEAK_HR: 111

## 2024-01-19 ASSESSMENT — LIFESTYLE VARIABLES
ALCOHOL_AMOUNT: 3
ALCOHOL_TYPE: BEER
ALCOHOL_USE: WEEKLY

## 2024-01-22 ENCOUNTER — HOSPITAL ENCOUNTER (OUTPATIENT)
Facility: HOSPITAL | Age: 84
Setting detail: RECURRING SERIES
Discharge: HOME | End: 2024-01-25
Payer: MEDICARE

## 2024-01-22 VITALS — WEIGHT: 168 LBS | BODY MASS INDEX: 25.55 KG/M2

## 2024-01-22 PROCEDURE — 93798 PHYS/QHP OP CAR RHAB W/ECG: CPT

## 2024-01-22 PROCEDURE — 93797 PHYS/QHP OP CAR RHAB WO ECG: CPT

## 2024-01-22 ASSESSMENT — EXERCISE STRESS TEST
PEAK_METS: 3.2
PEAK_HR: 111
PEAK_RPE: 11

## 2024-01-26 ENCOUNTER — APPOINTMENT (OUTPATIENT)
Facility: HOSPITAL | Age: 84
End: 2024-01-26
Payer: MEDICARE

## 2024-01-29 ENCOUNTER — HOSPITAL ENCOUNTER (OUTPATIENT)
Facility: HOSPITAL | Age: 84
Setting detail: RECURRING SERIES
Discharge: HOME | End: 2024-02-01
Payer: MEDICARE

## 2024-01-29 VITALS — WEIGHT: 167 LBS | BODY MASS INDEX: 25.4 KG/M2

## 2024-01-29 PROCEDURE — 93798 PHYS/QHP OP CAR RHAB W/ECG: CPT

## 2024-01-29 ASSESSMENT — EXERCISE STRESS TEST
PEAK_RPE: 11
PEAK_HR: 111
PEAK_METS: 3.2

## 2024-02-02 ENCOUNTER — APPOINTMENT (OUTPATIENT)
Facility: HOSPITAL | Age: 84
End: 2024-02-02
Payer: MEDICARE

## 2024-02-05 ENCOUNTER — HOSPITAL ENCOUNTER (OUTPATIENT)
Facility: HOSPITAL | Age: 84
Setting detail: RECURRING SERIES
Discharge: HOME | End: 2024-02-08
Payer: MEDICARE

## 2024-02-05 VITALS — BODY MASS INDEX: 25.85 KG/M2 | WEIGHT: 170 LBS

## 2024-02-05 PROCEDURE — 93798 PHYS/QHP OP CAR RHAB W/ECG: CPT

## 2024-02-05 ASSESSMENT — EXERCISE STRESS TEST
PEAK_HR: 117
PEAK_RPE: 11
PEAK_METS: 3.2

## 2024-02-09 ENCOUNTER — HOSPITAL ENCOUNTER (OUTPATIENT)
Facility: HOSPITAL | Age: 84
Setting detail: RECURRING SERIES
Discharge: HOME | End: 2024-02-12
Payer: MEDICARE

## 2024-02-09 VITALS — WEIGHT: 173 LBS | BODY MASS INDEX: 26.31 KG/M2

## 2024-02-09 PROCEDURE — 93798 PHYS/QHP OP CAR RHAB W/ECG: CPT

## 2024-02-12 ENCOUNTER — APPOINTMENT (OUTPATIENT)
Facility: HOSPITAL | Age: 84
End: 2024-02-12
Payer: MEDICARE

## 2024-02-16 ENCOUNTER — HOSPITAL ENCOUNTER (OUTPATIENT)
Facility: HOSPITAL | Age: 84
Setting detail: RECURRING SERIES
Discharge: HOME | End: 2024-02-19
Payer: MEDICARE

## 2024-02-16 VITALS — BODY MASS INDEX: 26.46 KG/M2 | WEIGHT: 174 LBS

## 2024-02-16 PROCEDURE — 93798 PHYS/QHP OP CAR RHAB W/ECG: CPT

## 2024-02-16 ASSESSMENT — EXERCISE STRESS TEST
PEAK_METS: 3.2
PEAK_HR: 113
PEAK_RPE: 11

## 2024-02-19 ENCOUNTER — APPOINTMENT (OUTPATIENT)
Facility: HOSPITAL | Age: 84
End: 2024-02-19
Payer: MEDICARE

## 2024-02-23 ENCOUNTER — HOSPITAL ENCOUNTER (OUTPATIENT)
Facility: HOSPITAL | Age: 84
Setting detail: RECURRING SERIES
Discharge: HOME | End: 2024-02-26
Payer: MEDICARE

## 2024-02-23 VITALS — BODY MASS INDEX: 25.85 KG/M2 | WEIGHT: 170 LBS

## 2024-02-23 PROCEDURE — 93798 PHYS/QHP OP CAR RHAB W/ECG: CPT

## 2024-02-23 ASSESSMENT — EXERCISE STRESS TEST
PEAK_RPE: 11
PEAK_METS: 3.2
PEAK_HR: 111

## 2024-02-26 ENCOUNTER — HOSPITAL ENCOUNTER (OUTPATIENT)
Facility: HOSPITAL | Age: 84
Setting detail: RECURRING SERIES
Discharge: HOME | End: 2024-02-29
Payer: MEDICARE

## 2024-02-26 VITALS — WEIGHT: 174 LBS | BODY MASS INDEX: 26.46 KG/M2

## 2024-02-26 PROCEDURE — 93798 PHYS/QHP OP CAR RHAB W/ECG: CPT

## 2024-02-26 ASSESSMENT — EXERCISE STRESS TEST
PEAK_METS: 3.3
PEAK_HR: 104
PEAK_RPE: 11

## 2024-03-01 ENCOUNTER — APPOINTMENT (OUTPATIENT)
Facility: HOSPITAL | Age: 84
End: 2024-03-01
Payer: MEDICARE

## 2024-03-11 ENCOUNTER — HOSPITAL ENCOUNTER (OUTPATIENT)
Facility: HOSPITAL | Age: 84
Setting detail: RECURRING SERIES
Discharge: HOME | End: 2024-03-14
Payer: MEDICARE

## 2024-03-11 VITALS — WEIGHT: 174 LBS | BODY MASS INDEX: 26.46 KG/M2

## 2024-03-11 PROCEDURE — 93798 PHYS/QHP OP CAR RHAB W/ECG: CPT

## 2024-03-11 ASSESSMENT — EXERCISE STRESS TEST
PEAK_METS: 3.3
PEAK_HR: 110
PEAK_RPE: 11

## 2024-03-22 ENCOUNTER — HOSPITAL ENCOUNTER (OUTPATIENT)
Facility: HOSPITAL | Age: 84
Setting detail: RECURRING SERIES
Discharge: HOME | End: 2024-03-25
Payer: MEDICARE

## 2024-03-22 VITALS — WEIGHT: 176 LBS | BODY MASS INDEX: 26.77 KG/M2

## 2024-03-22 PROCEDURE — 93798 PHYS/QHP OP CAR RHAB W/ECG: CPT

## 2024-03-22 ASSESSMENT — LIFESTYLE VARIABLES
ALCOHOL_AMOUNT: 3
ALCOHOL_TYPE: BEER
ALCOHOL_USE: WEEKLY

## 2024-03-22 ASSESSMENT — EXERCISE STRESS TEST
PEAK_HR: 120
PEAK_METS: 3.3
PEAK_RPE: 11
PEAK_RPD: 5

## 2024-03-22 ASSESSMENT — EJECTION FRACTION: EF_VALUE: 50

## 2024-03-25 ENCOUNTER — HOSPITAL ENCOUNTER (OUTPATIENT)
Facility: HOSPITAL | Age: 84
Setting detail: RECURRING SERIES
Discharge: HOME | End: 2024-03-28
Payer: MEDICARE

## 2024-03-25 VITALS — BODY MASS INDEX: 26.01 KG/M2 | WEIGHT: 171 LBS

## 2024-03-25 PROCEDURE — 93798 PHYS/QHP OP CAR RHAB W/ECG: CPT

## 2024-03-25 ASSESSMENT — EXERCISE STRESS TEST
PEAK_METS: 3.4
PEAK_RPE: 11
PEAK_HR: 106

## 2024-03-29 ENCOUNTER — APPOINTMENT (OUTPATIENT)
Facility: HOSPITAL | Age: 84
End: 2024-03-29
Payer: MEDICARE

## 2024-04-05 ENCOUNTER — APPOINTMENT (OUTPATIENT)
Facility: HOSPITAL | Age: 84
End: 2024-04-05
Payer: MEDICARE

## 2024-04-08 ENCOUNTER — HOSPITAL ENCOUNTER (OUTPATIENT)
Facility: HOSPITAL | Age: 84
Setting detail: RECURRING SERIES
Discharge: HOME | End: 2024-04-11
Payer: MEDICARE

## 2024-04-08 VITALS — BODY MASS INDEX: 25.85 KG/M2 | WEIGHT: 170 LBS

## 2024-04-08 PROCEDURE — 93798 PHYS/QHP OP CAR RHAB W/ECG: CPT

## 2024-04-08 ASSESSMENT — EXERCISE STRESS TEST
PEAK_RPE: 11
PEAK_HR: 112
PEAK_METS: 3.4

## 2024-04-12 ENCOUNTER — APPOINTMENT (OUTPATIENT)
Facility: HOSPITAL | Age: 84
End: 2024-04-12
Payer: MEDICARE

## 2024-04-19 ENCOUNTER — APPOINTMENT (OUTPATIENT)
Facility: HOSPITAL | Age: 84
End: 2024-04-19
Payer: MEDICARE

## 2024-04-22 ENCOUNTER — HOSPITAL ENCOUNTER (OUTPATIENT)
Facility: HOSPITAL | Age: 84
Setting detail: RECURRING SERIES
Discharge: HOME | End: 2024-04-25
Payer: MEDICARE

## 2024-04-22 VITALS — BODY MASS INDEX: 26.46 KG/M2 | WEIGHT: 174 LBS

## 2024-04-22 PROCEDURE — 93798 PHYS/QHP OP CAR RHAB W/ECG: CPT

## 2024-04-22 ASSESSMENT — LIFESTYLE VARIABLES
ALCOHOL_TYPE: BEER
ALCOHOL_AMOUNT: 3
ALCOHOL_USE: WEEKLY

## 2024-04-22 ASSESSMENT — EJECTION FRACTION: EF_VALUE: 50

## 2024-04-22 ASSESSMENT — EXERCISE STRESS TEST
PEAK_RPE: 11
PEAK_METS: 3.4
PEAK_HR: 108
PEAK_RPD: 5

## 2024-06-17 ENCOUNTER — HOSPITAL ENCOUNTER (OUTPATIENT)
Facility: HOSPITAL | Age: 84
Setting detail: RECURRING SERIES
Discharge: HOME | End: 2024-06-20
Payer: MEDICARE

## 2024-06-17 VITALS — BODY MASS INDEX: 26.69 KG/M2 | WEIGHT: 175.5 LBS

## 2024-06-17 PROCEDURE — 93798 PHYS/QHP OP CAR RHAB W/ECG: CPT

## 2024-06-17 ASSESSMENT — LIFESTYLE VARIABLES
ALCOHOL_AMOUNT: 3
ALCOHOL_TYPE: BEER
ALCOHOL_USE: WEEKLY

## 2024-06-17 ASSESSMENT — PATIENT HEALTH QUESTIONNAIRE - PHQ9
8. MOVING OR SPEAKING SO SLOWLY THAT OTHER PEOPLE COULD HAVE NOTICED. OR THE OPPOSITE, BEING SO FIGETY OR RESTLESS THAT YOU HAVE BEEN MOVING AROUND A LOT MORE THAN USUAL: NOT AT ALL
3. TROUBLE FALLING OR STAYING ASLEEP: NOT AT ALL
SUM OF ALL RESPONSES TO PHQ QUESTIONS 1-9: 0
2. FEELING DOWN, DEPRESSED OR HOPELESS: NOT AT ALL
5. POOR APPETITE OR OVEREATING: NOT AT ALL
7. TROUBLE CONCENTRATING ON THINGS, SUCH AS READING THE NEWSPAPER OR WATCHING TELEVISION: NOT AT ALL
10. IF YOU CHECKED OFF ANY PROBLEMS, HOW DIFFICULT HAVE THESE PROBLEMS MADE IT FOR YOU TO DO YOUR WORK, TAKE CARE OF THINGS AT HOME, OR GET ALONG WITH OTHER PEOPLE: NOT DIFFICULT AT ALL
4. FEELING TIRED OR HAVING LITTLE ENERGY: NOT AT ALL
SUM OF ALL RESPONSES TO PHQ QUESTIONS 1-9: 0
SUM OF ALL RESPONSES TO PHQ QUESTIONS 1-9: 0
1. LITTLE INTEREST OR PLEASURE IN DOING THINGS: NOT AT ALL
SUM OF ALL RESPONSES TO PHQ9 QUESTIONS 1 & 2: 0
9. THOUGHTS THAT YOU WOULD BE BETTER OFF DEAD, OR OF HURTING YOURSELF: NOT AT ALL
6. FEELING BAD ABOUT YOURSELF - OR THAT YOU ARE A FAILURE OR HAVE LET YOURSELF OR YOUR FAMILY DOWN: NOT AT ALL
SUM OF ALL RESPONSES TO PHQ QUESTIONS 1-9: 0

## 2024-06-17 ASSESSMENT — EJECTION FRACTION: EF_VALUE: 50

## 2024-06-17 ASSESSMENT — EXERCISE STRESS TEST
PEAK_HR: 126
PEAK_METS: 3.4
PEAK_RPE: 11
PEAK_RPD: 5

## 2024-06-17 NOTE — CARDIO/PULMONARY
DISCHARGE SUMMARY NOTE  2024      NAME: Shabbir Lackey Sr. : 1940 AGE: 84 y.o.  GENDER: male    CARDIAC REHAB ADMITTING DIAGNOSIS: Presence of coronary angioplasty implant and graft [Z95.5]    REFERRING PHYSICIAN: Noel Santos MD    MEDICAL HX:  Past Medical History:   Diagnosis Date    Arthritis     CAD (coronary artery disease)     coronary stent    Diabetes (HCC)     iddm newly on insulin 3/2012    Hypertension     Liver disease        LABS:     No results found for: \"HBA1C\", \"[id number]\"  Lab Results   Component Value Date/Time    CHOL 108 10/03/2023 04:07 AM    HDL 42 10/03/2023 04:07 AM    LDL 29.6 10/03/2023 04:07 AM    VLDL 36.4 10/03/2023 04:07 AM         ANTHROPOMETRICS:      Ht Readings from Last 1 Encounters:   23 1.727 m (5' 7.99\")      Wt Readings from Last 1 Encounters:   24 79.6 kg (175 lb 8 oz)        WAIST: 40.5         PROGRAM SUMMARY:    Shabbir Lackey Sr. completed  sessions in the Cardiac Rehab program. He will continue exercising 3 x week and focus on diet and nutrition at home. He attended 1 class and is aware of his cardiac risk factors and cardiac medications. Weight loss was (maintained) lbs. MET level increase from 2.2 to 3.4. 6MWT distance increased from 2.2 m to 3.4 m.      Questions addressed. Discharge plans discussed. Shea JACKSON Ziyad Poe verbalized understanding.      PJ MÁRQUEZ RN

## 2024-06-24 ENCOUNTER — APPOINTMENT (OUTPATIENT)
Facility: HOSPITAL | Age: 84
End: 2024-06-24
Payer: MEDICARE

## 2025-03-26 NOTE — ED NOTES
Called to place podiatry consult, call unable to be answered. Message left. The clinical goals for the shift include Patient will remain free from falls/injury this shift.    Over the shift, the patient did remain free from any falls or injury. Patient has been ambulating in room and in halls independently with staff standing by. Vitals have remained stable. Abdominal pain reported throughout shift, patient reports pain is worse after oral intake. IV abx and fluids were tolerated well this shift with no adverse effects. Patient currently lying in bed with call light in reach. Denies any needs at this time.

## (undated) DEVICE — Device: Brand: PROWATER

## (undated) DEVICE — BANDAGE,GAUZE,CONFORMING,4"X75",STRL,LF: Brand: MEDLINE

## (undated) DEVICE — CATHETER COR DIAG PIGTAILS PIG 145 CRV 5FR 110CM 6 SIDE H

## (undated) DEVICE — KIT ACCS INTRO 4FR L10CM NDL 21GA L7CM GWIRE L40CM

## (undated) DEVICE — TUBING PRSS MON L6IN PVC M FEM CONN

## (undated) DEVICE — SOL IRRIGATION INJ NACL 0.9% 500ML BTL

## (undated) DEVICE — CATH BLLN ANGIO 2.75X15MM NC EUPHORIA RX

## (undated) DEVICE — GLOVE ORANGE PI 7   MSG9070

## (undated) DEVICE — CUSTOM KT PTCA INFL DEV K05 00053H

## (undated) DEVICE — HI-TORQUE VERSACORE FLOPPY GUIDE WIRE SYSTEM 145 CM: Brand: HI-TORQUE VERSACORE

## (undated) DEVICE — 450 ML BOTTLE OF 0.05% CHLORHEXIDINE GLUCONATE IN 99.95% STERILE WATER FOR IRRIGATION, USP AND APPLICATOR.: Brand: IRRISEPT ANTIMICROBIAL WOUND LAVAGE

## (undated) DEVICE — GLIDESHEATH SLENDER ACCESS KIT: Brand: GLIDESHEATH SLENDER

## (undated) DEVICE — SWAB CULT LIQ STUART AGR AERB MOD IN BRK SGL RAYON TIP PLAS 220099] BECTON DICKINSON MICRO]

## (undated) DEVICE — GUIDEWIRE VASC L260CM 0.035IN J TIP L3MM PTFE FIX COR NAMIC

## (undated) DEVICE — PRECISION THIN (9.0 X 0.38 X 31.0MM)

## (undated) DEVICE — 3M™ TEGADERM™ TRANSPARENT FILM DRESSING FRAME STYLE, 1626W, 4 IN X 4-3/4 IN (10 CM X 12 CM), 50/CT 4CT/CASE: Brand: 3M™ TEGADERM™

## (undated) DEVICE — EXTREMITY-MRMC: Brand: MEDLINE INDUSTRIES, INC.

## (undated) DEVICE — SPLINT WR VELC FOAM NEUT POS DISP FOR RAD ART ACC SFT STRP

## (undated) DEVICE — ZIMMER® STERILE DISPOSABLE TOURNIQUET CUFF WITH PROTECTIVE SLEEVE AND PLC, DUAL PORT, SINGLE BLADDER, 18 IN. (46 CM)

## (undated) DEVICE — PROVE COVER: Brand: UNBRANDED

## (undated) DEVICE — SUTURE ETHLN SZ 4-0 L18IN NONABSORBABLE BLK L19MM PS-2 3/8 1667H

## (undated) DEVICE — CATHETER GUID 6FR L100CM DIA0.071IN NYL SHFT EBU3.5

## (undated) DEVICE — CULTURETTE SGL EVAC TUBE PALL -- 100/CA

## (undated) DEVICE — DEVICE DRIVE ROTAWIRE FLOPPY

## (undated) DEVICE — HEART CATH-MRMC: Brand: MEDLINE INDUSTRIES, INC.

## (undated) DEVICE — CATHETER DIAG 5FR L100CM LUMN ID0.047IN JL3.5 CRV 0 SIDE H

## (undated) DEVICE — BAND COMPR L24CM REG CLR PLAS HEMSTAT EXT HK AND LOOP RETEN

## (undated) DEVICE — TRAY PREP DRY W/ PREM GLV 2 APPL 6 SPNG 2 UNDPD 1 OVERWRAP

## (undated) DEVICE — PRE-CONNECTED EXCHANGEABLE BURR CATHETER AND BURR ADVANCING DEVICE: Brand: ROTAPRO™

## (undated) DEVICE — DRSG GZ PETROLATM OCL 3X18IN -- CURAD

## (undated) DEVICE — CATHETER BLLN SPRINTER LEGEND RX 142CM 15MM DIA2MM 0.022IN 14ATM

## (undated) DEVICE — SYRINGE ANGIO 12ML COR CTRL FIX M LUER CONN RNG GRP SLD RNG

## (undated) DEVICE — SYRINGE ANGIO 10 CC BRL STD PRNT POLYCARB LT BLU MEDALLION

## (undated) DEVICE — CATH BLLN ANGIO 2.50X20MM NC EUPHORIA RX

## (undated) DEVICE — Device: Brand: ASAHI CORSAIR PRO XS

## (undated) DEVICE — CATHETER PTCA SPRINTER LEGEND RX 2.5MM X 15MM 142CM GWIRE 0.022IN 8ATM

## (undated) DEVICE — SUTURE ETHLN SZ 2-0 L30IN NONABSORBABLE BLK L36MM PSLX 3/8 1697H

## (undated) DEVICE — CATHETER COR DIAG 4.0 5FR 100CM 0 SIDE H